# Patient Record
Sex: FEMALE | Race: WHITE | NOT HISPANIC OR LATINO | Employment: FULL TIME | URBAN - NONMETROPOLITAN AREA
[De-identification: names, ages, dates, MRNs, and addresses within clinical notes are randomized per-mention and may not be internally consistent; named-entity substitution may affect disease eponyms.]

---

## 2018-10-24 ENCOUNTER — HOSPITAL ENCOUNTER (OUTPATIENT)
Dept: MAMMOGRAPHY | Facility: HOSPITAL | Age: 38
Discharge: HOME | End: 2018-10-24
Admitting: NURSE PRACTITIONER

## 2018-10-24 DIAGNOSIS — Z12.39 ENCOUNTER FOR BREAST CANCER SCREENING OTHER THAN MAMMOGRAM: ICD-10-CM

## 2018-10-24 PROCEDURE — 77063 BREAST TOMOSYNTHESIS BI: CPT | Performed by: RADIOLOGY

## 2018-10-24 PROCEDURE — 77067 SCR MAMMO BI INCL CAD: CPT

## 2018-10-24 PROCEDURE — 77067 SCR MAMMO BI INCL CAD: CPT | Performed by: RADIOLOGY

## 2018-10-24 PROCEDURE — 77063 BREAST TOMOSYNTHESIS BI: CPT

## 2018-11-14 ENCOUNTER — HOSPITAL ENCOUNTER (EMERGENCY)
Facility: HOSPITAL | Age: 38
Discharge: HOME | End: 2018-11-14
Attending: EMERGENCY MEDICINE | Admitting: EMERGENCY MEDICINE

## 2018-11-14 VITALS
WEIGHT: 250 LBS | DIASTOLIC BLOOD PRESSURE: 58 MMHG | BODY MASS INDEX: 35 KG/M2 | TEMPERATURE: 99.3 F | RESPIRATION RATE: 16 BRPM | HEART RATE: 105 BPM | HEIGHT: 71 IN | OXYGEN SATURATION: 98 % | SYSTOLIC BLOOD PRESSURE: 115 MMHG

## 2018-11-14 DIAGNOSIS — J06.9 UPPER RESPIRATORY TRACT INFECTION, UNSPECIFIED TYPE: Primary | ICD-10-CM

## 2018-11-14 LAB
FLUAV AG NPH QL: NEGATIVE
FLUBV AG NPH QL IA: NEGATIVE

## 2018-11-14 PROCEDURE — 94640 AIRWAY INHALATION TREATMENT: CPT

## 2018-11-14 PROCEDURE — 94799 UNLISTED PULMONARY SVC/PX: CPT

## 2018-11-14 PROCEDURE — 87804 INFLUENZA ASSAY W/OPTIC: CPT | Performed by: PHYSICIAN ASSISTANT

## 2018-11-14 PROCEDURE — 99284 EMERGENCY DEPT VISIT MOD MDM: CPT

## 2018-11-14 RX ORDER — CETIRIZINE HYDROCHLORIDE 10 MG/1
10 TABLET ORAL DAILY
Qty: 30 TABLET | Refills: 0 | Status: SHIPPED | OUTPATIENT
Start: 2018-11-14

## 2018-11-14 RX ORDER — METHYLPREDNISOLONE 4 MG/1
TABLET ORAL
Qty: 21 TABLET | Refills: 0 | Status: SHIPPED | OUTPATIENT
Start: 2018-11-14

## 2018-11-14 RX ORDER — ALBUTEROL SULFATE 2.5 MG/3ML
2.5 SOLUTION RESPIRATORY (INHALATION) ONCE
Status: COMPLETED | OUTPATIENT
Start: 2018-11-14 | End: 2018-11-14

## 2018-11-14 RX ORDER — PROMETHAZINE HYDROCHLORIDE AND CODEINE PHOSPHATE 6.25; 1 MG/5ML; MG/5ML
5 SYRUP ORAL ONCE
Status: COMPLETED | OUTPATIENT
Start: 2018-11-14 | End: 2018-11-14

## 2018-11-14 RX ORDER — AZITHROMYCIN 250 MG/1
TABLET, FILM COATED ORAL
Qty: 6 TABLET | Refills: 0 | Status: SHIPPED | OUTPATIENT
Start: 2018-11-14

## 2018-11-14 RX ORDER — LEVOTHYROXINE SODIUM 0.1 MG/1
100 TABLET ORAL DAILY
COMMUNITY

## 2018-11-14 RX ADMIN — PROMETHAZINE HYDROCHLORIDE AND CODEINE PHOSPHATE 5 ML: 6.25; 1 SOLUTION ORAL at 23:13

## 2018-11-14 RX ADMIN — ALBUTEROL SULFATE 2.5 MG: 2.5 SOLUTION RESPIRATORY (INHALATION) at 22:51

## 2018-11-15 NOTE — ED PROVIDER NOTES
Subjective     History provided by:  Patient  URI   Presenting symptoms: congestion, cough, fever and rhinorrhea    Severity:  Moderate  Onset quality:  Sudden  Duration:  4 days  Timing:  Constant  Progression:  Worsening  Chronicity:  New  Relieved by:  Nothing  Ineffective treatments:  OTC medications      Review of Systems   Constitutional: Positive for fever.   HENT: Positive for congestion and rhinorrhea.    Respiratory: Positive for cough.    Cardiovascular: Negative.  Negative for chest pain.   Gastrointestinal: Negative.  Negative for abdominal pain.   Endocrine: Negative.    Genitourinary: Negative.  Negative for dysuria.   Skin: Negative.    Neurological: Negative.    Psychiatric/Behavioral: Negative.    All other systems reviewed and are negative.      Past Medical History:   Diagnosis Date   • Disease of thyroid gland    • Multiple sclerosis (CMS/HCC)        No Known Allergies    Past Surgical History:   Procedure Laterality Date   • CHOLECYSTECTOMY     • ENDOMETRIAL ABLATION         Family History   Problem Relation Age of Onset   • Breast cancer Maternal Aunt        Social History     Socioeconomic History   • Marital status:      Spouse name: Not on file   • Number of children: Not on file   • Years of education: Not on file   • Highest education level: Not on file   Tobacco Use   • Smoking status: Never Smoker   Substance and Sexual Activity   • Alcohol use: No     Frequency: Never   • Drug use: No   • Sexual activity: Yes     Partners: Male           Objective   Physical Exam   Constitutional: She is oriented to person, place, and time. She appears well-developed and well-nourished. No distress.   HENT:   Head: Normocephalic and atraumatic.   Right Ear: External ear normal.   Left Ear: External ear normal.   Nose: Nose normal.   Eyes: Conjunctivae are normal.   Neck: Normal range of motion. Neck supple. No JVD present. No tracheal deviation present.   Cardiovascular: Normal rate, regular  rhythm and normal heart sounds.   No murmur heard.  Pulmonary/Chest: Effort normal and breath sounds normal. No respiratory distress. She has no wheezes.   Abdominal: Soft. Bowel sounds are normal. There is no tenderness.   Musculoskeletal: Normal range of motion. She exhibits no edema or deformity.   Neurological: She is alert and oriented to person, place, and time. No cranial nerve deficit.   Skin: Skin is warm and dry. No rash noted. She is not diaphoretic. No erythema. No pallor.   Psychiatric: She has a normal mood and affect. Her behavior is normal. Thought content normal.   Nursing note and vitals reviewed.      Procedures           ED Course                  MDM  Number of Diagnoses or Management Options  Upper respiratory tract infection, unspecified type: new and requires workup     Amount and/or Complexity of Data Reviewed  Clinical lab tests: ordered and reviewed    Risk of Complications, Morbidity, and/or Mortality  Presenting problems: low  Diagnostic procedures: low  Management options: low    Patient Progress  Patient progress: stable        Final diagnoses:   Upper respiratory tract infection, unspecified type            Abad Alfredo PA  11/14/18 7774

## 2019-03-23 ENCOUNTER — HOSPITAL ENCOUNTER (EMERGENCY)
Facility: HOSPITAL | Age: 39
Discharge: HOME | End: 2019-03-23
Attending: EMERGENCY MEDICINE | Admitting: EMERGENCY MEDICINE

## 2019-03-23 ENCOUNTER — APPOINTMENT (OUTPATIENT)
Dept: GENERAL RADIOLOGY | Facility: HOSPITAL | Age: 39
End: 2019-03-23

## 2019-03-23 VITALS
HEIGHT: 71 IN | BODY MASS INDEX: 35.7 KG/M2 | HEART RATE: 100 BPM | TEMPERATURE: 99 F | OXYGEN SATURATION: 98 % | RESPIRATION RATE: 16 BRPM | WEIGHT: 255 LBS | SYSTOLIC BLOOD PRESSURE: 106 MMHG | DIASTOLIC BLOOD PRESSURE: 65 MMHG

## 2019-03-23 DIAGNOSIS — J10.1 INFLUENZA A: Primary | ICD-10-CM

## 2019-03-23 LAB
ALBUMIN SERPL-MCNC: 4.5 G/DL (ref 3.5–5.2)
ALBUMIN/GLOB SERPL: 1.3 G/DL
ALP SERPL-CCNC: 64 U/L (ref 39–117)
ALT SERPL W P-5'-P-CCNC: 37 U/L (ref 1–33)
ANION GAP SERPL CALCULATED.3IONS-SCNC: 13.4 MMOL/L
AST SERPL-CCNC: 50 U/L (ref 1–32)
BACTERIA UR QL AUTO: ABNORMAL /HPF
BASOPHILS # BLD AUTO: 0.03 10*3/MM3 (ref 0–0.2)
BASOPHILS NFR BLD AUTO: 0.4 % (ref 0–1.5)
BILIRUB SERPL-MCNC: 0.6 MG/DL (ref 0.2–1.2)
BILIRUB UR QL STRIP: NEGATIVE
BUN BLD-MCNC: 13 MG/DL (ref 6–20)
BUN/CREAT SERPL: 14 (ref 7–25)
CALCIUM SPEC-SCNC: 8.8 MG/DL (ref 8.6–10.5)
CHLORIDE SERPL-SCNC: 99 MMOL/L (ref 98–107)
CLARITY UR: ABNORMAL
CO2 SERPL-SCNC: 23.6 MMOL/L (ref 22–29)
COLOR UR: YELLOW
CREAT BLD-MCNC: 0.93 MG/DL (ref 0.57–1)
D-LACTATE SERPL-SCNC: 1.1 MMOL/L (ref 0.5–2)
DEPRECATED RDW RBC AUTO: 41.9 FL (ref 37–54)
EOSINOPHIL # BLD AUTO: 0.01 10*3/MM3 (ref 0–0.4)
EOSINOPHIL NFR BLD AUTO: 0.1 % (ref 0.3–6.2)
ERYTHROCYTE [DISTWIDTH] IN BLOOD BY AUTOMATED COUNT: 14 % (ref 12.3–15.4)
FLUAV AG NPH QL: POSITIVE
FLUBV AG NPH QL IA: NEGATIVE
GFR SERPL CREATININE-BSD FRML MDRD: 67 ML/MIN/1.73
GLOBULIN UR ELPH-MCNC: 3.5 GM/DL
GLUCOSE BLD-MCNC: 116 MG/DL (ref 65–99)
GLUCOSE UR STRIP-MCNC: NEGATIVE MG/DL
HCT VFR BLD AUTO: 38.7 % (ref 34–46.6)
HGB BLD-MCNC: 13.1 G/DL (ref 12–15.9)
HGB UR QL STRIP.AUTO: ABNORMAL
HYALINE CASTS UR QL AUTO: ABNORMAL /LPF
IMM GRANULOCYTES # BLD AUTO: 0.01 10*3/MM3 (ref 0–0.05)
IMM GRANULOCYTES NFR BLD AUTO: 0.1 % (ref 0–0.5)
KETONES UR QL STRIP: ABNORMAL
LEUKOCYTE ESTERASE UR QL STRIP.AUTO: NEGATIVE
LIPASE SERPL-CCNC: 33 U/L (ref 13–60)
LYMPHOCYTES # BLD AUTO: 1.9 10*3/MM3 (ref 0.7–3.1)
LYMPHOCYTES NFR BLD AUTO: 26.9 % (ref 19.6–45.3)
MCH RBC QN AUTO: 28.3 PG (ref 26.6–33)
MCHC RBC AUTO-ENTMCNC: 33.9 G/DL (ref 31.5–35.7)
MCV RBC AUTO: 83.6 FL (ref 79–97)
MONOCYTES # BLD AUTO: 1.38 10*3/MM3 (ref 0.1–0.9)
MONOCYTES NFR BLD AUTO: 19.5 % (ref 5–12)
NEUTROPHILS # BLD AUTO: 3.73 10*3/MM3 (ref 1.4–7)
NEUTROPHILS NFR BLD AUTO: 53 % (ref 42.7–76)
NITRITE UR QL STRIP: NEGATIVE
PH UR STRIP.AUTO: 5.5 [PH] (ref 5–8)
PLATELET # BLD AUTO: 261 10*3/MM3 (ref 140–450)
PMV BLD AUTO: 9.7 FL (ref 6–12)
POTASSIUM BLD-SCNC: 3.5 MMOL/L (ref 3.5–5.2)
PROT SERPL-MCNC: 8 G/DL (ref 6–8.5)
PROT UR QL STRIP: NEGATIVE
RBC # BLD AUTO: 4.63 10*6/MM3 (ref 3.77–5.28)
RBC # UR: ABNORMAL /HPF
REF LAB TEST METHOD: ABNORMAL
SODIUM BLD-SCNC: 136 MMOL/L (ref 136–145)
SP GR UR STRIP: 1.01 (ref 1–1.03)
SQUAMOUS #/AREA URNS HPF: ABNORMAL /HPF
UROBILINOGEN UR QL STRIP: ABNORMAL
WBC NRBC COR # BLD: 7.06 10*3/MM3 (ref 3.4–10.8)
WBC UR QL AUTO: ABNORMAL /HPF

## 2019-03-23 PROCEDURE — 81001 URINALYSIS AUTO W/SCOPE: CPT | Performed by: EMERGENCY MEDICINE

## 2019-03-23 PROCEDURE — 25010000002 ONDANSETRON PER 1 MG: Performed by: EMERGENCY MEDICINE

## 2019-03-23 PROCEDURE — 85025 COMPLETE CBC W/AUTO DIFF WBC: CPT | Performed by: EMERGENCY MEDICINE

## 2019-03-23 PROCEDURE — 83690 ASSAY OF LIPASE: CPT | Performed by: EMERGENCY MEDICINE

## 2019-03-23 PROCEDURE — 71046 X-RAY EXAM CHEST 2 VIEWS: CPT | Performed by: RADIOLOGY

## 2019-03-23 PROCEDURE — 87804 INFLUENZA ASSAY W/OPTIC: CPT | Performed by: EMERGENCY MEDICINE

## 2019-03-23 PROCEDURE — 83605 ASSAY OF LACTIC ACID: CPT | Performed by: EMERGENCY MEDICINE

## 2019-03-23 PROCEDURE — 80053 COMPREHEN METABOLIC PANEL: CPT | Performed by: EMERGENCY MEDICINE

## 2019-03-23 PROCEDURE — 87040 BLOOD CULTURE FOR BACTERIA: CPT | Performed by: EMERGENCY MEDICINE

## 2019-03-23 PROCEDURE — 99284 EMERGENCY DEPT VISIT MOD MDM: CPT

## 2019-03-23 PROCEDURE — 71046 X-RAY EXAM CHEST 2 VIEWS: CPT

## 2019-03-23 PROCEDURE — 96361 HYDRATE IV INFUSION ADD-ON: CPT

## 2019-03-23 PROCEDURE — 96374 THER/PROPH/DIAG INJ IV PUSH: CPT

## 2019-03-23 RX ORDER — SODIUM CHLORIDE 9 MG/ML
125 INJECTION, SOLUTION INTRAVENOUS CONTINUOUS
Status: DISCONTINUED | OUTPATIENT
Start: 2019-03-23 | End: 2019-03-23 | Stop reason: HOSPADM

## 2019-03-23 RX ORDER — PROMETHAZINE HYDROCHLORIDE 25 MG/1
25 SUPPOSITORY RECTAL EVERY 6 HOURS PRN
Qty: 12 SUPPOSITORY | Refills: 0 | Status: SHIPPED | OUTPATIENT
Start: 2019-03-23

## 2019-03-23 RX ORDER — ONDANSETRON 2 MG/ML
4 INJECTION INTRAMUSCULAR; INTRAVENOUS ONCE
Status: COMPLETED | OUTPATIENT
Start: 2019-03-23 | End: 2019-03-23

## 2019-03-23 RX ORDER — SODIUM CHLORIDE 0.9 % (FLUSH) 0.9 %
10 SYRINGE (ML) INJECTION AS NEEDED
Status: DISCONTINUED | OUTPATIENT
Start: 2019-03-23 | End: 2019-03-23 | Stop reason: HOSPADM

## 2019-03-23 RX ORDER — ONDANSETRON 4 MG/1
4 TABLET, ORALLY DISINTEGRATING ORAL 4 TIMES DAILY
Qty: 15 TABLET | Refills: 0 | Status: SHIPPED | OUTPATIENT
Start: 2019-03-23

## 2019-03-23 RX ADMIN — SODIUM CHLORIDE 125 ML/HR: 9 INJECTION, SOLUTION INTRAVENOUS at 01:40

## 2019-03-23 RX ADMIN — ONDANSETRON 4 MG: 2 INJECTION, SOLUTION INTRAMUSCULAR; INTRAVENOUS at 01:35

## 2019-03-23 RX ADMIN — SODIUM CHLORIDE 1000 ML: 9 INJECTION, SOLUTION INTRAVENOUS at 01:42

## 2019-03-28 LAB
BACTERIA SPEC AEROBE CULT: NORMAL
BACTERIA SPEC AEROBE CULT: NORMAL

## 2020-01-14 ENCOUNTER — HOSPITAL ENCOUNTER (OUTPATIENT)
Dept: MAMMOGRAPHY | Facility: HOSPITAL | Age: 40
Discharge: HOME | End: 2020-01-14
Admitting: PHYSICIAN ASSISTANT

## 2020-01-14 ENCOUNTER — LAB (OUTPATIENT)
Dept: LAB | Facility: HOSPITAL | Age: 40
End: 2020-01-14

## 2020-01-14 ENCOUNTER — TRANSCRIBE ORDERS (OUTPATIENT)
Dept: ADMINISTRATIVE | Facility: HOSPITAL | Age: 40
End: 2020-01-14

## 2020-01-14 DIAGNOSIS — E78.1 HYPERTRIGLYCERIDEMIA, FAMILIAL: ICD-10-CM

## 2020-01-14 DIAGNOSIS — Z12.31 VISIT FOR SCREENING MAMMOGRAM: ICD-10-CM

## 2020-01-14 DIAGNOSIS — E78.1 PURE HYPERGLYCERIDEMIA: Primary | ICD-10-CM

## 2020-01-14 DIAGNOSIS — E03.9 HYPOTHYROIDISM, ADULT: ICD-10-CM

## 2020-01-14 LAB
ALBUMIN SERPL-MCNC: 4.2 G/DL (ref 3.5–5.2)
ALBUMIN/GLOB SERPL: 1.2 G/DL
ALP SERPL-CCNC: 71 U/L (ref 39–117)
ALT SERPL W P-5'-P-CCNC: 35 U/L (ref 1–33)
ANION GAP SERPL CALCULATED.3IONS-SCNC: 12.2 MMOL/L (ref 5–15)
AST SERPL-CCNC: 31 U/L (ref 1–32)
BASOPHILS # BLD AUTO: 0.1 10*3/MM3 (ref 0–0.2)
BASOPHILS NFR BLD AUTO: 1 % (ref 0–1.5)
BILIRUB SERPL-MCNC: 0.6 MG/DL (ref 0.2–1.2)
BUN BLD-MCNC: 11 MG/DL (ref 6–20)
BUN/CREAT SERPL: 12.2 (ref 7–25)
CALCIUM SPEC-SCNC: 9 MG/DL (ref 8.6–10.5)
CHLORIDE SERPL-SCNC: 102 MMOL/L (ref 98–107)
CHOLEST SERPL-MCNC: 157 MG/DL (ref 0–200)
CO2 SERPL-SCNC: 24.8 MMOL/L (ref 22–29)
CREAT BLD-MCNC: 0.9 MG/DL (ref 0.57–1)
DEPRECATED RDW RBC AUTO: 40.6 FL (ref 37–54)
EOSINOPHIL # BLD AUTO: 0.23 10*3/MM3 (ref 0–0.4)
EOSINOPHIL NFR BLD AUTO: 2.4 % (ref 0.3–6.2)
ERYTHROCYTE [DISTWIDTH] IN BLOOD BY AUTOMATED COUNT: 13 % (ref 12.3–15.4)
GFR SERPL CREATININE-BSD FRML MDRD: 69 ML/MIN/1.73
GLOBULIN UR ELPH-MCNC: 3.4 GM/DL
GLUCOSE BLD-MCNC: 103 MG/DL (ref 65–99)
HCT VFR BLD AUTO: 37.7 % (ref 34–46.6)
HDLC SERPL-MCNC: 38 MG/DL (ref 40–60)
HGB BLD-MCNC: 12.9 G/DL (ref 12–15.9)
IMM GRANULOCYTES # BLD AUTO: 0.04 10*3/MM3 (ref 0–0.05)
IMM GRANULOCYTES NFR BLD AUTO: 0.4 % (ref 0–0.5)
LDLC SERPL CALC-MCNC: 95 MG/DL (ref 0–100)
LDLC/HDLC SERPL: 2.49 {RATIO}
LYMPHOCYTES # BLD AUTO: 2.9 10*3/MM3 (ref 0.7–3.1)
LYMPHOCYTES NFR BLD AUTO: 30.1 % (ref 19.6–45.3)
MCH RBC QN AUTO: 29.3 PG (ref 26.6–33)
MCHC RBC AUTO-ENTMCNC: 34.2 G/DL (ref 31.5–35.7)
MCV RBC AUTO: 85.7 FL (ref 79–97)
MONOCYTES # BLD AUTO: 0.74 10*3/MM3 (ref 0.1–0.9)
MONOCYTES NFR BLD AUTO: 7.7 % (ref 5–12)
NEUTROPHILS # BLD AUTO: 5.62 10*3/MM3 (ref 1.7–7)
NEUTROPHILS NFR BLD AUTO: 58.4 % (ref 42.7–76)
NRBC BLD AUTO-RTO: 0 /100 WBC (ref 0–0.2)
PLATELET # BLD AUTO: 310 10*3/MM3 (ref 140–450)
PMV BLD AUTO: 9.8 FL (ref 6–12)
POTASSIUM BLD-SCNC: 4.2 MMOL/L (ref 3.5–5.2)
PROT SERPL-MCNC: 7.6 G/DL (ref 6–8.5)
RBC # BLD AUTO: 4.4 10*6/MM3 (ref 3.77–5.28)
SODIUM BLD-SCNC: 139 MMOL/L (ref 136–145)
TRIGL SERPL-MCNC: 121 MG/DL (ref 0–150)
TSH SERPL DL<=0.05 MIU/L-ACNC: 5.53 UIU/ML (ref 0.27–4.2)
VLDLC SERPL-MCNC: 24.2 MG/DL (ref 5–40)
WBC NRBC COR # BLD: 9.63 10*3/MM3 (ref 3.4–10.8)

## 2020-01-14 PROCEDURE — 77063 BREAST TOMOSYNTHESIS BI: CPT

## 2020-01-14 PROCEDURE — 80053 COMPREHEN METABOLIC PANEL: CPT

## 2020-01-14 PROCEDURE — 84443 ASSAY THYROID STIM HORMONE: CPT

## 2020-01-14 PROCEDURE — 80061 LIPID PANEL: CPT

## 2020-01-14 PROCEDURE — 77063 BREAST TOMOSYNTHESIS BI: CPT | Performed by: RADIOLOGY

## 2020-01-14 PROCEDURE — 85025 COMPLETE CBC W/AUTO DIFF WBC: CPT

## 2020-01-14 PROCEDURE — 36415 COLL VENOUS BLD VENIPUNCTURE: CPT

## 2020-01-14 PROCEDURE — 77067 SCR MAMMO BI INCL CAD: CPT | Performed by: RADIOLOGY

## 2020-01-14 PROCEDURE — 77067 SCR MAMMO BI INCL CAD: CPT

## 2020-01-27 ENCOUNTER — APPOINTMENT (OUTPATIENT)
Dept: CT IMAGING | Facility: HOSPITAL | Age: 40
End: 2020-01-27

## 2020-01-27 ENCOUNTER — HOSPITAL ENCOUNTER (EMERGENCY)
Facility: HOSPITAL | Age: 40
Discharge: HOME | End: 2020-01-27
Attending: FAMILY MEDICINE | Admitting: FAMILY MEDICINE

## 2020-01-27 VITALS
DIASTOLIC BLOOD PRESSURE: 66 MMHG | BODY MASS INDEX: 37.94 KG/M2 | RESPIRATION RATE: 20 BRPM | SYSTOLIC BLOOD PRESSURE: 131 MMHG | TEMPERATURE: 97.9 F | WEIGHT: 265 LBS | OXYGEN SATURATION: 99 % | HEIGHT: 70 IN | HEART RATE: 69 BPM

## 2020-01-27 DIAGNOSIS — N20.1 URETEROLITHIASIS: Primary | ICD-10-CM

## 2020-01-27 LAB
ALBUMIN SERPL-MCNC: 4.32 G/DL (ref 3.5–5.2)
ALBUMIN/GLOB SERPL: 1.2 G/DL
ALP SERPL-CCNC: 74 U/L (ref 39–117)
ALT SERPL W P-5'-P-CCNC: 31 U/L (ref 1–33)
ANION GAP SERPL CALCULATED.3IONS-SCNC: 15.1 MMOL/L (ref 5–15)
AST SERPL-CCNC: 50 U/L (ref 1–32)
BACTERIA UR QL AUTO: ABNORMAL /HPF
BASOPHILS # BLD AUTO: 0.11 10*3/MM3 (ref 0–0.2)
BASOPHILS NFR BLD AUTO: 1 % (ref 0–1.5)
BILIRUB SERPL-MCNC: 0.7 MG/DL (ref 0.2–1.2)
BILIRUB UR QL STRIP: NEGATIVE
BUN BLD-MCNC: 10 MG/DL (ref 6–20)
BUN/CREAT SERPL: 10.2 (ref 7–25)
CALCIUM SPEC-SCNC: 9 MG/DL (ref 8.6–10.5)
CHLORIDE SERPL-SCNC: 103 MMOL/L (ref 98–107)
CLARITY UR: ABNORMAL
CO2 SERPL-SCNC: 19.9 MMOL/L (ref 22–29)
COLOR UR: ABNORMAL
CREAT BLD-MCNC: 0.98 MG/DL (ref 0.57–1)
DEPRECATED RDW RBC AUTO: 40.9 FL (ref 37–54)
EOSINOPHIL # BLD AUTO: 0.19 10*3/MM3 (ref 0–0.4)
EOSINOPHIL NFR BLD AUTO: 1.8 % (ref 0.3–6.2)
ERYTHROCYTE [DISTWIDTH] IN BLOOD BY AUTOMATED COUNT: 13.1 % (ref 12.3–15.4)
GFR SERPL CREATININE-BSD FRML MDRD: 63 ML/MIN/1.73
GLOBULIN UR ELPH-MCNC: 3.5 GM/DL
GLUCOSE BLD-MCNC: 116 MG/DL (ref 65–99)
GLUCOSE UR STRIP-MCNC: NEGATIVE MG/DL
HCT VFR BLD AUTO: 38 % (ref 34–46.6)
HGB BLD-MCNC: 12.8 G/DL (ref 12–15.9)
HGB UR QL STRIP.AUTO: ABNORMAL
HYALINE CASTS UR QL AUTO: ABNORMAL /LPF
IMM GRANULOCYTES # BLD AUTO: 0.04 10*3/MM3 (ref 0–0.05)
IMM GRANULOCYTES NFR BLD AUTO: 0.4 % (ref 0–0.5)
KETONES UR QL STRIP: ABNORMAL
LEUKOCYTE ESTERASE UR QL STRIP.AUTO: ABNORMAL
LIPASE SERPL-CCNC: 23 U/L (ref 13–60)
LYMPHOCYTES # BLD AUTO: 2.98 10*3/MM3 (ref 0.7–3.1)
LYMPHOCYTES NFR BLD AUTO: 27.6 % (ref 19.6–45.3)
MCH RBC QN AUTO: 29 PG (ref 26.6–33)
MCHC RBC AUTO-ENTMCNC: 33.7 G/DL (ref 31.5–35.7)
MCV RBC AUTO: 86.2 FL (ref 79–97)
MONOCYTES # BLD AUTO: 1.09 10*3/MM3 (ref 0.1–0.9)
MONOCYTES NFR BLD AUTO: 10.1 % (ref 5–12)
NEUTROPHILS # BLD AUTO: 6.38 10*3/MM3 (ref 1.7–7)
NEUTROPHILS NFR BLD AUTO: 59.1 % (ref 42.7–76)
NITRITE UR QL STRIP: NEGATIVE
NRBC BLD AUTO-RTO: 0 /100 WBC (ref 0–0.2)
PH UR STRIP.AUTO: 6.5 [PH] (ref 5–8)
PLATELET # BLD AUTO: 363 10*3/MM3 (ref 140–450)
PMV BLD AUTO: 9.5 FL (ref 6–12)
POTASSIUM BLD-SCNC: 4.1 MMOL/L (ref 3.5–5.2)
PROT SERPL-MCNC: 7.8 G/DL (ref 6–8.5)
PROT UR QL STRIP: ABNORMAL
RBC # BLD AUTO: 4.41 10*6/MM3 (ref 3.77–5.28)
RBC # UR: ABNORMAL /HPF
REF LAB TEST METHOD: ABNORMAL
SODIUM BLD-SCNC: 138 MMOL/L (ref 136–145)
SP GR UR STRIP: >1.03 (ref 1–1.03)
SQUAMOUS #/AREA URNS HPF: ABNORMAL /HPF
UROBILINOGEN UR QL STRIP: ABNORMAL
WBC NRBC COR # BLD: 10.79 10*3/MM3 (ref 3.4–10.8)
WBC UR QL AUTO: ABNORMAL /HPF

## 2020-01-27 PROCEDURE — 96374 THER/PROPH/DIAG INJ IV PUSH: CPT

## 2020-01-27 PROCEDURE — 83690 ASSAY OF LIPASE: CPT | Performed by: NURSE PRACTITIONER

## 2020-01-27 PROCEDURE — 74176 CT ABD & PELVIS W/O CONTRAST: CPT | Performed by: RADIOLOGY

## 2020-01-27 PROCEDURE — 81001 URINALYSIS AUTO W/SCOPE: CPT | Performed by: NURSE PRACTITIONER

## 2020-01-27 PROCEDURE — 99283 EMERGENCY DEPT VISIT LOW MDM: CPT

## 2020-01-27 PROCEDURE — 96376 TX/PRO/DX INJ SAME DRUG ADON: CPT

## 2020-01-27 PROCEDURE — 25010000002 PROMETHAZINE PER 50 MG: Performed by: NURSE PRACTITIONER

## 2020-01-27 PROCEDURE — 25010000002 HYDROMORPHONE PER 4 MG: Performed by: NURSE PRACTITIONER

## 2020-01-27 PROCEDURE — 74176 CT ABD & PELVIS W/O CONTRAST: CPT

## 2020-01-27 PROCEDURE — 25010000002 ONDANSETRON PER 1 MG: Performed by: NURSE PRACTITIONER

## 2020-01-27 PROCEDURE — 85025 COMPLETE CBC W/AUTO DIFF WBC: CPT | Performed by: NURSE PRACTITIONER

## 2020-01-27 PROCEDURE — 96375 TX/PRO/DX INJ NEW DRUG ADDON: CPT

## 2020-01-27 PROCEDURE — 80053 COMPREHEN METABOLIC PANEL: CPT | Performed by: NURSE PRACTITIONER

## 2020-01-27 PROCEDURE — 25010000002 HYDROMORPHONE 1 MG/ML SOLUTION: Performed by: NURSE PRACTITIONER

## 2020-01-27 PROCEDURE — 96361 HYDRATE IV INFUSION ADD-ON: CPT

## 2020-01-27 PROCEDURE — 87086 URINE CULTURE/COLONY COUNT: CPT | Performed by: NURSE PRACTITIONER

## 2020-01-27 PROCEDURE — 25010000002 KETOROLAC TROMETHAMINE PER 15 MG: Performed by: NURSE PRACTITIONER

## 2020-01-27 RX ORDER — CEFTRIAXONE 1 G/1
1000 INJECTION, POWDER, FOR SOLUTION INTRAMUSCULAR; INTRAVENOUS ONCE
Status: DISCONTINUED | OUTPATIENT
Start: 2020-01-27 | End: 2020-01-27

## 2020-01-27 RX ORDER — KETOROLAC TROMETHAMINE 30 MG/ML
30 INJECTION, SOLUTION INTRAMUSCULAR; INTRAVENOUS ONCE
Status: COMPLETED | OUTPATIENT
Start: 2020-01-27 | End: 2020-01-27

## 2020-01-27 RX ORDER — HYDROMORPHONE HYDROCHLORIDE 1 MG/ML
0.5 INJECTION, SOLUTION INTRAMUSCULAR; INTRAVENOUS; SUBCUTANEOUS ONCE
Status: COMPLETED | OUTPATIENT
Start: 2020-01-27 | End: 2020-01-27

## 2020-01-27 RX ORDER — PROMETHAZINE HYDROCHLORIDE 25 MG/ML
12.5 INJECTION, SOLUTION INTRAMUSCULAR; INTRAVENOUS ONCE
Status: COMPLETED | OUTPATIENT
Start: 2020-01-27 | End: 2020-01-27

## 2020-01-27 RX ORDER — ONDANSETRON 4 MG/1
4 TABLET, ORALLY DISINTEGRATING ORAL EVERY 6 HOURS PRN
Qty: 12 TABLET | Refills: 0 | Status: SHIPPED | OUTPATIENT
Start: 2020-01-27

## 2020-01-27 RX ORDER — CEPHALEXIN 500 MG/1
500 CAPSULE ORAL 3 TIMES DAILY
Qty: 21 CAPSULE | Refills: 0 | Status: SHIPPED | OUTPATIENT
Start: 2020-01-27 | End: 2020-02-03

## 2020-01-27 RX ORDER — TAMSULOSIN HYDROCHLORIDE 0.4 MG/1
1 CAPSULE ORAL DAILY
Qty: 15 CAPSULE | Refills: 0 | Status: SHIPPED | OUTPATIENT
Start: 2020-01-27

## 2020-01-27 RX ORDER — SODIUM CHLORIDE 0.9 % (FLUSH) 0.9 %
10 SYRINGE (ML) INJECTION AS NEEDED
Status: DISCONTINUED | OUTPATIENT
Start: 2020-01-27 | End: 2020-01-27 | Stop reason: HOSPADM

## 2020-01-27 RX ORDER — ONDANSETRON 2 MG/ML
4 INJECTION INTRAMUSCULAR; INTRAVENOUS ONCE
Status: COMPLETED | OUTPATIENT
Start: 2020-01-27 | End: 2020-01-27

## 2020-01-27 RX ORDER — OXYCODONE HYDROCHLORIDE AND ACETAMINOPHEN 5; 325 MG/1; MG/1
1 TABLET ORAL EVERY 6 HOURS PRN
Qty: 12 TABLET | Refills: 0 | Status: SHIPPED | OUTPATIENT
Start: 2020-01-27

## 2020-01-27 RX ORDER — LIDOCAINE HYDROCHLORIDE 10 MG/ML
2.1 INJECTION, SOLUTION EPIDURAL; INFILTRATION; INTRACAUDAL; PERINEURAL ONCE
Status: DISCONTINUED | OUTPATIENT
Start: 2020-01-27 | End: 2020-01-27

## 2020-01-27 RX ORDER — KETOROLAC TROMETHAMINE 10 MG/1
10 TABLET, FILM COATED ORAL EVERY 6 HOURS PRN
Qty: 10 TABLET | Refills: 0 | Status: SHIPPED | OUTPATIENT
Start: 2020-01-27

## 2020-01-27 RX ADMIN — HYDROMORPHONE HYDROCHLORIDE 0.5 MG: 1 INJECTION, SOLUTION INTRAMUSCULAR; INTRAVENOUS; SUBCUTANEOUS at 10:14

## 2020-01-27 RX ADMIN — ONDANSETRON 4 MG: 2 INJECTION INTRAMUSCULAR; INTRAVENOUS at 10:18

## 2020-01-27 RX ADMIN — PROMETHAZINE HYDROCHLORIDE 12.5 MG: 25 INJECTION INTRAMUSCULAR; INTRAVENOUS at 10:52

## 2020-01-27 RX ADMIN — HYDROMORPHONE HYDROCHLORIDE 1 MG: 1 INJECTION, SOLUTION INTRAMUSCULAR; INTRAVENOUS; SUBCUTANEOUS at 10:51

## 2020-01-27 RX ADMIN — SODIUM CHLORIDE 1000 ML: 9 INJECTION, SOLUTION INTRAVENOUS at 10:08

## 2020-01-27 RX ADMIN — KETOROLAC TROMETHAMINE 30 MG: 30 INJECTION, SOLUTION INTRAMUSCULAR at 11:43

## 2020-01-27 NOTE — DISCHARGE INSTRUCTIONS
Follow up with Dr. Joe in 2-3 days.     Increase fluids.    Return to the emergency room for worsening symptoms.

## 2020-01-27 NOTE — ED PROVIDER NOTES
Subjective     History provided by:  Patient   used: No    Flank Pain   Pain location:  R flank  Pain quality: sharp    Pain severity:  Moderate  Onset quality:  Sudden  Timing:  Constant  Progression:  Worsening  Chronicity:  New  Context: not alcohol use, not diet changes, not eating, not medication withdrawal, not recent illness, not recent sexual activity, not sick contacts and not trauma    Relieved by:  Nothing  Worsened by:  Nothing  Ineffective treatments:  None tried  Associated symptoms: nausea and vomiting    Associated symptoms: no chest pain and no cough    Risk factors: has not had multiple surgeries        Review of Systems   Constitutional: Negative.    HENT: Negative.    Eyes: Negative.    Respiratory: Negative.  Negative for cough.    Cardiovascular: Negative.  Negative for chest pain.   Gastrointestinal: Positive for nausea and vomiting.   Endocrine: Negative.    Genitourinary: Positive for flank pain.   Skin: Negative.    Allergic/Immunologic: Negative.    Neurological: Negative.    Hematological: Negative.    Psychiatric/Behavioral: Negative.        Past Medical History:   Diagnosis Date   • Disease of thyroid gland    • Multiple sclerosis (CMS/HCC)        No Known Allergies    Past Surgical History:   Procedure Laterality Date   • CHOLECYSTECTOMY     • ENDOMETRIAL ABLATION         Family History   Problem Relation Age of Onset   • Breast cancer Maternal Aunt        Social History     Socioeconomic History   • Marital status:      Spouse name: Not on file   • Number of children: Not on file   • Years of education: Not on file   • Highest education level: Not on file   Tobacco Use   • Smoking status: Never Smoker   Substance and Sexual Activity   • Alcohol use: No     Frequency: Never   • Drug use: No   • Sexual activity: Yes     Partners: Male           Objective   Physical Exam   Constitutional: She is oriented to person, place, and time. She appears well-developed  and well-nourished.   HENT:   Head: Normocephalic.   Right Ear: External ear normal.   Left Ear: External ear normal.   Mouth/Throat: Oropharynx is clear and moist.   Eyes: Pupils are equal, round, and reactive to light. Conjunctivae and EOM are normal.   Neck: Normal range of motion. Neck supple.   Cardiovascular: Normal rate, regular rhythm, normal heart sounds and intact distal pulses.   Pulmonary/Chest: Effort normal and breath sounds normal.   Abdominal: Soft. Bowel sounds are normal. There is tenderness.   Rigth CVA tenderness   Musculoskeletal: Normal range of motion.   Neurological: She is alert and oriented to person, place, and time.   Skin: Skin is warm and dry. Capillary refill takes less than 2 seconds.   Psychiatric: She has a normal mood and affect. Her behavior is normal. Thought content normal.   Nursing note and vitals reviewed.      Procedures           ED Course                                               MDM    Final diagnoses:   Ureterolithiasis            Robert Alfredo, APRN  01/27/20 1142

## 2020-01-28 LAB — BACTERIA SPEC AEROBE CULT: ABNORMAL

## 2021-01-06 ENCOUNTER — HOSPITAL ENCOUNTER (OUTPATIENT)
Dept: HOSPITAL 79 - KOH-I | Age: 41
End: 2021-01-06
Attending: PHYSICIAN ASSISTANT
Payer: COMMERCIAL

## 2021-01-06 DIAGNOSIS — M79.672: Primary | ICD-10-CM

## 2021-02-04 ENCOUNTER — HOSPITAL ENCOUNTER (OUTPATIENT)
Dept: HOSPITAL 79 - EXRD | Age: 41
End: 2021-02-04
Attending: PODIATRIST
Payer: COMMERCIAL

## 2021-02-04 DIAGNOSIS — I73.89: Primary | ICD-10-CM

## 2021-02-04 DIAGNOSIS — I70.203: ICD-10-CM

## 2022-05-05 ENCOUNTER — HOSPITAL ENCOUNTER (OUTPATIENT)
Dept: HOSPITAL 79 - KOH-I | Age: 42
End: 2022-05-05
Attending: STUDENT IN AN ORGANIZED HEALTH CARE EDUCATION/TRAINING PROGRAM
Payer: COMMERCIAL

## 2022-05-05 DIAGNOSIS — R06.02: Primary | ICD-10-CM

## 2022-08-29 ENCOUNTER — TRANSCRIBE ORDERS (OUTPATIENT)
Dept: ADMINISTRATIVE | Facility: HOSPITAL | Age: 42
End: 2022-08-29

## 2022-08-29 DIAGNOSIS — K76.0 FATTY METAMORPHOSIS OF LIVER: Primary | ICD-10-CM

## 2022-09-14 ENCOUNTER — HOSPITAL ENCOUNTER (OUTPATIENT)
Dept: ULTRASOUND IMAGING | Facility: HOSPITAL | Age: 42
Discharge: HOME | End: 2022-09-14
Admitting: STUDENT IN AN ORGANIZED HEALTH CARE EDUCATION/TRAINING PROGRAM

## 2022-09-14 DIAGNOSIS — K76.0 FATTY METAMORPHOSIS OF LIVER: ICD-10-CM

## 2022-09-14 PROCEDURE — 76700 US EXAM ABDOM COMPLETE: CPT

## 2022-09-14 PROCEDURE — 76700 US EXAM ABDOM COMPLETE: CPT | Performed by: RADIOLOGY

## 2023-01-20 ENCOUNTER — APPOINTMENT (OUTPATIENT)
Dept: GENERAL RADIOLOGY | Facility: HOSPITAL | Age: 43
End: 2023-01-20
Payer: COMMERCIAL

## 2023-01-20 ENCOUNTER — HOSPITAL ENCOUNTER (EMERGENCY)
Facility: HOSPITAL | Age: 43
Discharge: HOME | End: 2023-01-21
Attending: EMERGENCY MEDICINE | Admitting: EMERGENCY MEDICINE
Payer: COMMERCIAL

## 2023-01-20 DIAGNOSIS — R07.9 CHEST PAIN, UNSPECIFIED TYPE: Primary | ICD-10-CM

## 2023-01-20 LAB
ALBUMIN SERPL-MCNC: 4 G/DL (ref 3.5–5.2)
ALBUMIN/GLOB SERPL: 1.3 G/DL
ALP SERPL-CCNC: 94 U/L (ref 39–117)
ALT SERPL W P-5'-P-CCNC: 25 U/L (ref 1–33)
ANION GAP SERPL CALCULATED.3IONS-SCNC: 6.8 MMOL/L (ref 5–15)
AST SERPL-CCNC: 34 U/L (ref 1–32)
BASOPHILS # BLD AUTO: 0.09 10*3/MM3 (ref 0–0.2)
BASOPHILS NFR BLD AUTO: 1 % (ref 0–1.5)
BILIRUB SERPL-MCNC: 0.4 MG/DL (ref 0–1.2)
BUN SERPL-MCNC: 10 MG/DL (ref 6–20)
BUN/CREAT SERPL: 11.5 (ref 7–25)
CALCIUM SPEC-SCNC: 9.2 MG/DL (ref 8.6–10.5)
CHLORIDE SERPL-SCNC: 103 MMOL/L (ref 98–107)
CO2 SERPL-SCNC: 27.2 MMOL/L (ref 22–29)
CREAT SERPL-MCNC: 0.87 MG/DL (ref 0.57–1)
DEPRECATED RDW RBC AUTO: 40.9 FL (ref 37–54)
EGFRCR SERPLBLD CKD-EPI 2021: 84.4 ML/MIN/1.73
EOSINOPHIL # BLD AUTO: 0.25 10*3/MM3 (ref 0–0.4)
EOSINOPHIL NFR BLD AUTO: 2.8 % (ref 0.3–6.2)
ERYTHROCYTE [DISTWIDTH] IN BLOOD BY AUTOMATED COUNT: 12.9 % (ref 12.3–15.4)
GLOBULIN UR ELPH-MCNC: 3.2 GM/DL
GLUCOSE SERPL-MCNC: 146 MG/DL (ref 65–99)
HCT VFR BLD AUTO: 37.6 % (ref 34–46.6)
HGB BLD-MCNC: 12.8 G/DL (ref 12–15.9)
HOLD SPECIMEN: NORMAL
HOLD SPECIMEN: NORMAL
IMM GRANULOCYTES # BLD AUTO: 0.04 10*3/MM3 (ref 0–0.05)
IMM GRANULOCYTES NFR BLD AUTO: 0.4 % (ref 0–0.5)
LYMPHOCYTES # BLD AUTO: 3.35 10*3/MM3 (ref 0.7–3.1)
LYMPHOCYTES NFR BLD AUTO: 37.1 % (ref 19.6–45.3)
MCH RBC QN AUTO: 29.8 PG (ref 26.6–33)
MCHC RBC AUTO-ENTMCNC: 34 G/DL (ref 31.5–35.7)
MCV RBC AUTO: 87.4 FL (ref 79–97)
MONOCYTES # BLD AUTO: 0.76 10*3/MM3 (ref 0.1–0.9)
MONOCYTES NFR BLD AUTO: 8.4 % (ref 5–12)
NEUTROPHILS NFR BLD AUTO: 4.55 10*3/MM3 (ref 1.7–7)
NEUTROPHILS NFR BLD AUTO: 50.3 % (ref 42.7–76)
NRBC BLD AUTO-RTO: 0 /100 WBC (ref 0–0.2)
PLATELET # BLD AUTO: 261 10*3/MM3 (ref 140–450)
PMV BLD AUTO: 9.2 FL (ref 6–12)
POTASSIUM SERPL-SCNC: 4.5 MMOL/L (ref 3.5–5.2)
PROT SERPL-MCNC: 7.2 G/DL (ref 6–8.5)
RBC # BLD AUTO: 4.3 10*6/MM3 (ref 3.77–5.28)
SODIUM SERPL-SCNC: 137 MMOL/L (ref 136–145)
TROPONIN T SERPL-MCNC: <0.01 NG/ML (ref 0–0.03)
WBC NRBC COR # BLD: 9.04 10*3/MM3 (ref 3.4–10.8)
WHOLE BLOOD HOLD COAG: NORMAL
WHOLE BLOOD HOLD SPECIMEN: NORMAL

## 2023-01-20 PROCEDURE — 71045 X-RAY EXAM CHEST 1 VIEW: CPT

## 2023-01-20 PROCEDURE — 93005 ELECTROCARDIOGRAM TRACING: CPT | Performed by: EMERGENCY MEDICINE

## 2023-01-20 PROCEDURE — 99283 EMERGENCY DEPT VISIT LOW MDM: CPT

## 2023-01-20 PROCEDURE — 80053 COMPREHEN METABOLIC PANEL: CPT | Performed by: EMERGENCY MEDICINE

## 2023-01-20 PROCEDURE — 84484 ASSAY OF TROPONIN QUANT: CPT | Performed by: EMERGENCY MEDICINE

## 2023-01-20 PROCEDURE — 36415 COLL VENOUS BLD VENIPUNCTURE: CPT

## 2023-01-20 PROCEDURE — 99284 EMERGENCY DEPT VISIT MOD MDM: CPT

## 2023-01-20 PROCEDURE — 85025 COMPLETE CBC W/AUTO DIFF WBC: CPT | Performed by: EMERGENCY MEDICINE

## 2023-01-20 RX ORDER — NITROGLYCERIN 0.4 MG/1
0.4 TABLET SUBLINGUAL
Status: DISCONTINUED | OUTPATIENT
Start: 2023-01-20 | End: 2023-01-21 | Stop reason: HOSPADM

## 2023-01-20 RX ORDER — SODIUM CHLORIDE 0.9 % (FLUSH) 0.9 %
10 SYRINGE (ML) INJECTION AS NEEDED
Status: DISCONTINUED | OUTPATIENT
Start: 2023-01-20 | End: 2023-01-21 | Stop reason: HOSPADM

## 2023-01-20 RX ORDER — ASPIRIN 81 MG/1
324 TABLET, CHEWABLE ORAL ONCE
Status: COMPLETED | OUTPATIENT
Start: 2023-01-20 | End: 2023-01-20

## 2023-01-20 RX ADMIN — ASPIRIN 324 MG: 81 TABLET, CHEWABLE ORAL at 20:48

## 2023-01-20 RX ADMIN — NITROGLYCERIN 0.4 MG: 0.4 TABLET, ORALLY DISINTEGRATING SUBLINGUAL at 23:25

## 2023-01-21 VITALS
BODY MASS INDEX: 35.7 KG/M2 | WEIGHT: 255 LBS | HEIGHT: 71 IN | SYSTOLIC BLOOD PRESSURE: 145 MMHG | OXYGEN SATURATION: 100 % | TEMPERATURE: 98.5 F | DIASTOLIC BLOOD PRESSURE: 88 MMHG | RESPIRATION RATE: 18 BRPM | HEART RATE: 94 BPM

## 2023-01-21 LAB
QT INTERVAL: 364 MS
QTC INTERVAL: 452 MS
TROPONIN T SERPL-MCNC: <0.01 NG/ML (ref 0–0.03)

## 2023-01-21 PROCEDURE — 84484 ASSAY OF TROPONIN QUANT: CPT | Performed by: EMERGENCY MEDICINE

## 2023-01-21 NOTE — ED NOTES
MEDICAL SCREENING:    Reason for Visit: Chest Pain    Patient initially seen in triage.  The patient was advised further evaluation and diagnostic testing will be needed, some of the treatment and testing will be initiated in the lobby in order to begin the process.  The patient will be returned to the waiting area for the time being and possibly be re-assessed by a subsequent ED provider.  The patient will be brought back to the treatment area in as timely manner as possible.       Bentley Henriquez MD  01/20/23 2038

## 2023-01-21 NOTE — ED PROVIDER NOTES
Subjective   History of Present Illness  Patient is a 42-year-old female with past medical history significant for thyroid disease, hyperlipidemia and multiple sclerosis.  She presents to the ED today with complaints of midsternal chest pain that she describes as achiness.  She reports that this pain has been ongoing for the last 4 to 5 days.  She states that she has never had chest pain before in the past so this concerned her.  She reports some mild shortness of breath.  Denied any radiation of pain.  She denies any fever, cough, nausea, vomiting, abdominal pain, diaphoresis, dizziness, headache, syncope, focal weakness, numbness or tingling.        Review of Systems   Constitutional: Negative.  Negative for fever.   HENT: Negative.    Eyes: Negative.    Respiratory: Positive for shortness of breath.    Cardiovascular: Positive for chest pain.   Gastrointestinal: Negative.  Negative for abdominal pain.   Endocrine: Negative.    Genitourinary: Negative.  Negative for dysuria.   Musculoskeletal: Negative.    Skin: Negative.    Allergic/Immunologic: Negative.    Neurological: Negative.    Hematological: Negative.    Psychiatric/Behavioral: Negative.    All other systems reviewed and are negative.      Past Medical History:   Diagnosis Date   • Disease of thyroid gland    • Multiple sclerosis (CMS/HCC)        No Known Allergies    Past Surgical History:   Procedure Laterality Date   • CHOLECYSTECTOMY     • ENDOMETRIAL ABLATION         Family History   Problem Relation Age of Onset   • Breast cancer Maternal Aunt        Social History     Socioeconomic History   • Marital status:    Tobacco Use   • Smoking status: Never   Substance and Sexual Activity   • Alcohol use: No   • Drug use: No   • Sexual activity: Yes     Partners: Male           Objective   Physical Exam  Vitals and nursing note reviewed.   Constitutional:       General: She is not in acute distress.     Appearance: She is well-developed. She is not  diaphoretic.   HENT:      Head: Normocephalic and atraumatic.      Right Ear: External ear normal.      Left Ear: External ear normal.      Nose: Nose normal.   Eyes:      Conjunctiva/sclera: Conjunctivae normal.      Pupils: Pupils are equal, round, and reactive to light.   Neck:      Vascular: No JVD.      Trachea: No tracheal deviation.   Cardiovascular:      Rate and Rhythm: Normal rate and regular rhythm.      Heart sounds: Normal heart sounds. No murmur heard.  Pulmonary:      Effort: Pulmonary effort is normal. No respiratory distress.      Breath sounds: Normal breath sounds. No wheezing.   Abdominal:      General: Bowel sounds are normal.      Palpations: Abdomen is soft.      Tenderness: There is no abdominal tenderness.   Musculoskeletal:         General: No deformity. Normal range of motion.      Cervical back: Normal range of motion and neck supple.   Skin:     General: Skin is warm and dry.      Capillary Refill: Capillary refill takes less than 2 seconds.      Coloration: Skin is not pale.      Findings: No erythema or rash.   Neurological:      General: No focal deficit present.      Mental Status: She is alert.      Cranial Nerves: No cranial nerve deficit.   Psychiatric:         Mood and Affect: Mood normal.         Behavior: Behavior normal.         Thought Content: Thought content normal.         Procedures       Results for orders placed or performed during the hospital encounter of 01/20/23   Comprehensive Metabolic Panel    Specimen: Arm, Right; Blood   Result Value Ref Range    Glucose 146 (H) 65 - 99 mg/dL    BUN 10 6 - 20 mg/dL    Creatinine 0.87 0.57 - 1.00 mg/dL    Sodium 137 136 - 145 mmol/L    Potassium 4.5 3.5 - 5.2 mmol/L    Chloride 103 98 - 107 mmol/L    CO2 27.2 22.0 - 29.0 mmol/L    Calcium 9.2 8.6 - 10.5 mg/dL    Total Protein 7.2 6.0 - 8.5 g/dL    Albumin 4.0 3.5 - 5.2 g/dL    ALT (SGPT) 25 1 - 33 U/L    AST (SGOT) 34 (H) 1 - 32 U/L    Alkaline Phosphatase 94 39 - 117 U/L     Total Bilirubin 0.4 0.0 - 1.2 mg/dL    Globulin 3.2 gm/dL    A/G Ratio 1.3 g/dL    BUN/Creatinine Ratio 11.5 7.0 - 25.0    Anion Gap 6.8 5.0 - 15.0 mmol/L    eGFR 84.4 >60.0 mL/min/1.73   Troponin    Specimen: Arm, Right; Blood   Result Value Ref Range    Troponin T <0.010 0.000 - 0.030 ng/mL   Troponin    Specimen: Arm, Right; Blood   Result Value Ref Range    Troponin T <0.010 0.000 - 0.030 ng/mL   CBC Auto Differential    Specimen: Arm, Right; Blood   Result Value Ref Range    WBC 9.04 3.40 - 10.80 10*3/mm3    RBC 4.30 3.77 - 5.28 10*6/mm3    Hemoglobin 12.8 12.0 - 15.9 g/dL    Hematocrit 37.6 34.0 - 46.6 %    MCV 87.4 79.0 - 97.0 fL    MCH 29.8 26.6 - 33.0 pg    MCHC 34.0 31.5 - 35.7 g/dL    RDW 12.9 12.3 - 15.4 %    RDW-SD 40.9 37.0 - 54.0 fl    MPV 9.2 6.0 - 12.0 fL    Platelets 261 140 - 450 10*3/mm3    Neutrophil % 50.3 42.7 - 76.0 %    Lymphocyte % 37.1 19.6 - 45.3 %    Monocyte % 8.4 5.0 - 12.0 %    Eosinophil % 2.8 0.3 - 6.2 %    Basophil % 1.0 0.0 - 1.5 %    Immature Grans % 0.4 0.0 - 0.5 %    Neutrophils, Absolute 4.55 1.70 - 7.00 10*3/mm3    Lymphocytes, Absolute 3.35 (H) 0.70 - 3.10 10*3/mm3    Monocytes, Absolute 0.76 0.10 - 0.90 10*3/mm3    Eosinophils, Absolute 0.25 0.00 - 0.40 10*3/mm3    Basophils, Absolute 0.09 0.00 - 0.20 10*3/mm3    Immature Grans, Absolute 0.04 0.00 - 0.05 10*3/mm3    nRBC 0.0 0.0 - 0.2 /100 WBC   ECG 12 Lead Chest Pain   Result Value Ref Range    QT Interval 364 ms    QTC Interval 452 ms   Green Top (Gel)   Result Value Ref Range    Extra Tube Hold for add-ons.    Lavender Top   Result Value Ref Range    Extra Tube hold for add-on    Gold Top - SST   Result Value Ref Range    Extra Tube Hold for add-ons.    Light Blue Top   Result Value Ref Range    Extra Tube Hold for add-ons.      XR Chest 1 View   Final Result   No acute cardiopulmonary findings.      Signer Name: Chris Liz MD    Signed: 1/20/2023 10:05 PM    Workstation Name: [id number]     [facility]          ED Course  ED Course as of 01/21/23 0130   Fri Jan 20, 2023   2101 EKG performed at 2044 shows sinus rhythm, rate 93.  GA interval 182, QRS duration 106, QTc 452 ms.  Some baseline artifact.  Nonspecific T wave changes.  No evidence for STEMI.  Electronically signed by Josue Rai MD, 01/20/23, 9:01 PM EST.   [CM]   Sat Jan 21, 2023   0124 Patient is feeling better and states her chest pain has resolved.  She is refusing admission for chest pain rule out.  She states that she feels better and has a lot to do tomorrow and really does not have the time to be admitted.  I discussed with her risks to leaving and advised her to return to the ED if her chest pain returns or worsens.  We will order an outpatient stress test and advised her to follow-up with cardiology. [MB]   0125 Heart score 4 [MB]      ED Course User Index  [CM] Josue Rai MD  [MB] Barbara Corado, APRN                                           [facility]    Final diagnoses:   Chest pain, unspecified type       ED Disposition  ED Disposition     ED Disposition   Discharge    Condition   Stable    Comment   --             Estella Ramirez MD  [address] [address]  [phone]    Call in 3 days           Medication List      No changes were made to your prescriptions during this visit.          Barbara Corado, VIJAYA  01/21/23 0130

## 2023-01-23 ENCOUNTER — TRANSCRIBE ORDERS (OUTPATIENT)
Dept: HOSPICE | Facility: HOSPITAL | Age: 43
End: 2023-01-23
Payer: COMMERCIAL

## 2023-01-23 DIAGNOSIS — R07.89 OTHER CHEST PAIN: Primary | ICD-10-CM

## 2023-08-04 ENCOUNTER — TRANSCRIBE ORDERS (OUTPATIENT)
Dept: ADMINISTRATIVE | Facility: HOSPITAL | Age: 43
End: 2023-08-04
Payer: COMMERCIAL

## 2023-08-04 DIAGNOSIS — G35 MULTIPLE SCLEROSIS: Primary | ICD-10-CM

## 2023-08-24 ENCOUNTER — HOSPITAL ENCOUNTER (OUTPATIENT)
Dept: MRI IMAGING | Facility: HOSPITAL | Age: 43
Discharge: HOME | End: 2023-08-24
Admitting: STUDENT IN AN ORGANIZED HEALTH CARE EDUCATION/TRAINING PROGRAM
Payer: COMMERCIAL

## 2023-08-24 DIAGNOSIS — G35 MULTIPLE SCLEROSIS: ICD-10-CM

## 2023-08-24 PROCEDURE — 70553 MRI BRAIN STEM W/O & W/DYE: CPT

## 2023-08-24 PROCEDURE — 0 GADOBENATE DIMEGLUMINE 529 MG/ML SOLUTION: Performed by: STUDENT IN AN ORGANIZED HEALTH CARE EDUCATION/TRAINING PROGRAM

## 2023-08-24 PROCEDURE — A9577 INJ MULTIHANCE: HCPCS | Performed by: STUDENT IN AN ORGANIZED HEALTH CARE EDUCATION/TRAINING PROGRAM

## 2023-08-24 RX ADMIN — GADOBENATE DIMEGLUMINE 20 ML: 529 INJECTION, SOLUTION INTRAVENOUS at 11:11

## 2023-09-07 ENCOUNTER — TRANSCRIBE ORDERS (OUTPATIENT)
Dept: ADMINISTRATIVE | Facility: HOSPITAL | Age: 43
End: 2023-09-07
Payer: COMMERCIAL

## 2023-09-07 DIAGNOSIS — K75.81 NONALCOHOLIC STEATOHEPATITIS: Primary | ICD-10-CM

## 2023-09-15 ENCOUNTER — HOSPITAL ENCOUNTER (OUTPATIENT)
Dept: ULTRASOUND IMAGING | Facility: HOSPITAL | Age: 43
Discharge: HOME | End: 2023-09-15
Payer: COMMERCIAL

## 2023-09-15 ENCOUNTER — TRANSCRIBE ORDERS (OUTPATIENT)
Dept: ADMINISTRATIVE | Facility: HOSPITAL | Age: 43
End: 2023-09-15
Payer: COMMERCIAL

## 2023-09-15 ENCOUNTER — LAB (OUTPATIENT)
Dept: LAB | Facility: HOSPITAL | Age: 43
End: 2023-09-15
Payer: COMMERCIAL

## 2023-09-15 DIAGNOSIS — K76.0 FATTY METAMORPHOSIS OF LIVER: Primary | ICD-10-CM

## 2023-09-15 DIAGNOSIS — K75.81 NONALCOHOLIC STEATOHEPATITIS: ICD-10-CM

## 2023-09-15 DIAGNOSIS — K76.0 FATTY METAMORPHOSIS OF LIVER: ICD-10-CM

## 2023-09-15 LAB
ALPHA1 GLOB MFR UR ELPH: 145 MG/DL (ref 90–200)
HBV CORE IGM SERPL QL IA: NORMAL

## 2023-09-15 PROCEDURE — 36415 COLL VENOUS BLD VENIPUNCTURE: CPT

## 2023-09-15 PROCEDURE — 76705 ECHO EXAM OF ABDOMEN: CPT

## 2023-09-15 PROCEDURE — 82103 ALPHA-1-ANTITRYPSIN TOTAL: CPT

## 2023-09-15 PROCEDURE — 86038 ANTINUCLEAR ANTIBODIES: CPT

## 2023-09-15 PROCEDURE — 86705 HEP B CORE ANTIBODY IGM: CPT

## 2023-09-15 PROCEDURE — 86381 MITOCHONDRIAL ANTIBODY EACH: CPT

## 2023-09-16 LAB — MITOCHONDRIA M2 IGG SER-ACNC: <20 UNITS (ref 0–20)

## 2023-09-18 LAB — ANA SER QL: NEGATIVE

## 2024-01-09 ENCOUNTER — APPOINTMENT (OUTPATIENT)
Dept: MRI IMAGING | Facility: HOSPITAL | Age: 44
End: 2024-01-09
Payer: COMMERCIAL

## 2024-01-09 ENCOUNTER — HOSPITAL ENCOUNTER (OUTPATIENT)
Facility: HOSPITAL | Age: 44
Setting detail: OBSERVATION
Discharge: HOME | End: 2024-01-10
Attending: STUDENT IN AN ORGANIZED HEALTH CARE EDUCATION/TRAINING PROGRAM | Admitting: INTERNAL MEDICINE
Payer: COMMERCIAL

## 2024-01-09 ENCOUNTER — APPOINTMENT (OUTPATIENT)
Dept: CT IMAGING | Facility: HOSPITAL | Age: 44
End: 2024-01-09
Payer: COMMERCIAL

## 2024-01-09 ENCOUNTER — APPOINTMENT (OUTPATIENT)
Dept: GENERAL RADIOLOGY | Facility: HOSPITAL | Age: 44
End: 2024-01-09
Payer: COMMERCIAL

## 2024-01-09 DIAGNOSIS — G35 MULTIPLE SCLEROSIS EXACERBATION: Primary | ICD-10-CM

## 2024-01-09 LAB
ALBUMIN SERPL-MCNC: 4.4 G/DL (ref 3.5–5.2)
ALBUMIN/GLOB SERPL: 1.3 G/DL
ALP SERPL-CCNC: 81 U/L (ref 39–117)
ALT SERPL W P-5'-P-CCNC: 20 U/L (ref 1–33)
ANION GAP SERPL CALCULATED.3IONS-SCNC: 11 MMOL/L (ref 5–15)
AST SERPL-CCNC: 33 U/L (ref 1–32)
BACTERIA UR QL AUTO: ABNORMAL /HPF
BASOPHILS # BLD AUTO: 0.1 10*3/MM3 (ref 0–0.2)
BASOPHILS NFR BLD AUTO: 1.4 % (ref 0–1.5)
BILIRUB SERPL-MCNC: 0.7 MG/DL (ref 0–1.2)
BILIRUB UR QL STRIP: NEGATIVE
BUN SERPL-MCNC: 9 MG/DL (ref 6–20)
BUN/CREAT SERPL: 9.7 (ref 7–25)
CALCIUM SPEC-SCNC: 9.5 MG/DL (ref 8.6–10.5)
CHLORIDE SERPL-SCNC: 103 MMOL/L (ref 98–107)
CLARITY UR: ABNORMAL
CO2 SERPL-SCNC: 25 MMOL/L (ref 22–29)
COLOR UR: YELLOW
CREAT SERPL-MCNC: 0.93 MG/DL (ref 0.57–1)
DEPRECATED RDW RBC AUTO: 39.8 FL (ref 37–54)
EGFRCR SERPLBLD CKD-EPI 2021: 77.4 ML/MIN/1.73
EOSINOPHIL # BLD AUTO: 0.15 10*3/MM3 (ref 0–0.4)
EOSINOPHIL NFR BLD AUTO: 2 % (ref 0.3–6.2)
ERYTHROCYTE [DISTWIDTH] IN BLOOD BY AUTOMATED COUNT: 12.6 % (ref 12.3–15.4)
GEN 5 2HR TROPONIN T REFLEX: <6 NG/L
GLOBULIN UR ELPH-MCNC: 3.3 GM/DL
GLUCOSE SERPL-MCNC: 95 MG/DL (ref 65–99)
GLUCOSE UR STRIP-MCNC: NEGATIVE MG/DL
HCT VFR BLD AUTO: 41.9 % (ref 34–46.6)
HGB BLD-MCNC: 14 G/DL (ref 12–15.9)
HGB UR QL STRIP.AUTO: ABNORMAL
HOLD SPECIMEN: NORMAL
HOLD SPECIMEN: NORMAL
HYALINE CASTS UR QL AUTO: ABNORMAL /LPF
IMM GRANULOCYTES # BLD AUTO: 0.03 10*3/MM3 (ref 0–0.05)
IMM GRANULOCYTES NFR BLD AUTO: 0.4 % (ref 0–0.5)
KETONES UR QL STRIP: NEGATIVE
LEUKOCYTE ESTERASE UR QL STRIP.AUTO: NEGATIVE
LYMPHOCYTES # BLD AUTO: 2.17 10*3/MM3 (ref 0.7–3.1)
LYMPHOCYTES NFR BLD AUTO: 29.4 % (ref 19.6–45.3)
MCH RBC QN AUTO: 28.8 PG (ref 26.6–33)
MCHC RBC AUTO-ENTMCNC: 33.4 G/DL (ref 31.5–35.7)
MCV RBC AUTO: 86.2 FL (ref 79–97)
MONOCYTES # BLD AUTO: 0.51 10*3/MM3 (ref 0.1–0.9)
MONOCYTES NFR BLD AUTO: 6.9 % (ref 5–12)
NEUTROPHILS NFR BLD AUTO: 4.42 10*3/MM3 (ref 1.7–7)
NEUTROPHILS NFR BLD AUTO: 59.9 % (ref 42.7–76)
NITRITE UR QL STRIP: NEGATIVE
NRBC BLD AUTO-RTO: 0 /100 WBC (ref 0–0.2)
PH UR STRIP.AUTO: 6 [PH] (ref 5–8)
PLATELET # BLD AUTO: 283 10*3/MM3 (ref 140–450)
PMV BLD AUTO: 8.9 FL (ref 6–12)
POTASSIUM SERPL-SCNC: 4 MMOL/L (ref 3.5–5.2)
PROT SERPL-MCNC: 7.7 G/DL (ref 6–8.5)
PROT UR QL STRIP: ABNORMAL
QT INTERVAL: 410 MS
QTC INTERVAL: 451 MS
RBC # BLD AUTO: 4.86 10*6/MM3 (ref 3.77–5.28)
RBC # UR STRIP: ABNORMAL /HPF
REF LAB TEST METHOD: ABNORMAL
SODIUM SERPL-SCNC: 139 MMOL/L (ref 136–145)
SP GR UR STRIP: 1.03 (ref 1–1.03)
SQUAMOUS #/AREA URNS HPF: ABNORMAL /HPF
TROPONIN T DELTA: NORMAL
TROPONIN T SERPL HS-MCNC: <6 NG/L
TSH SERPL DL<=0.05 MIU/L-ACNC: 5.78 UIU/ML (ref 0.27–4.2)
UROBILINOGEN UR QL STRIP: ABNORMAL
WBC # UR STRIP: ABNORMAL /HPF
WBC NRBC COR # BLD AUTO: 7.38 10*3/MM3 (ref 3.4–10.8)
WHOLE BLOOD HOLD COAG: NORMAL
WHOLE BLOOD HOLD SPECIMEN: NORMAL

## 2024-01-09 PROCEDURE — 0 GADOBENATE DIMEGLUMINE 529 MG/ML SOLUTION: Performed by: STUDENT IN AN ORGANIZED HEALTH CARE EDUCATION/TRAINING PROGRAM

## 2024-01-09 PROCEDURE — 80050 GENERAL HEALTH PANEL: CPT | Performed by: STUDENT IN AN ORGANIZED HEALTH CARE EDUCATION/TRAINING PROGRAM

## 2024-01-09 PROCEDURE — G0378 HOSPITAL OBSERVATION PER HR: HCPCS

## 2024-01-09 PROCEDURE — 70553 MRI BRAIN STEM W/O & W/DYE: CPT

## 2024-01-09 PROCEDURE — 99223 1ST HOSP IP/OBS HIGH 75: CPT | Performed by: INTERNAL MEDICINE

## 2024-01-09 PROCEDURE — 70450 CT HEAD/BRAIN W/O DYE: CPT

## 2024-01-09 PROCEDURE — 71045 X-RAY EXAM CHEST 1 VIEW: CPT

## 2024-01-09 PROCEDURE — 93010 ELECTROCARDIOGRAM REPORT: CPT | Performed by: INTERNAL MEDICINE

## 2024-01-09 PROCEDURE — 71045 X-RAY EXAM CHEST 1 VIEW: CPT | Performed by: RADIOLOGY

## 2024-01-09 PROCEDURE — 96372 THER/PROPH/DIAG INJ SC/IM: CPT

## 2024-01-09 PROCEDURE — 36415 COLL VENOUS BLD VENIPUNCTURE: CPT

## 2024-01-09 PROCEDURE — 25010000002 METHYLPREDNISOLONE PER 125 MG: Performed by: STUDENT IN AN ORGANIZED HEALTH CARE EDUCATION/TRAINING PROGRAM

## 2024-01-09 PROCEDURE — 70553 MRI BRAIN STEM W/O & W/DYE: CPT | Performed by: RADIOLOGY

## 2024-01-09 PROCEDURE — 25010000002 ENOXAPARIN PER 10 MG: Performed by: INTERNAL MEDICINE

## 2024-01-09 PROCEDURE — 96365 THER/PROPH/DIAG IV INF INIT: CPT

## 2024-01-09 PROCEDURE — 99285 EMERGENCY DEPT VISIT HI MDM: CPT

## 2024-01-09 PROCEDURE — 93005 ELECTROCARDIOGRAM TRACING: CPT | Performed by: STUDENT IN AN ORGANIZED HEALTH CARE EDUCATION/TRAINING PROGRAM

## 2024-01-09 PROCEDURE — A9577 INJ MULTIHANCE: HCPCS | Performed by: STUDENT IN AN ORGANIZED HEALTH CARE EDUCATION/TRAINING PROGRAM

## 2024-01-09 PROCEDURE — 81001 URINALYSIS AUTO W/SCOPE: CPT | Performed by: INTERNAL MEDICINE

## 2024-01-09 PROCEDURE — 84484 ASSAY OF TROPONIN QUANT: CPT | Performed by: STUDENT IN AN ORGANIZED HEALTH CARE EDUCATION/TRAINING PROGRAM

## 2024-01-09 PROCEDURE — 70450 CT HEAD/BRAIN W/O DYE: CPT | Performed by: RADIOLOGY

## 2024-01-09 RX ORDER — DEXAMETHASONE SODIUM PHOSPHATE 10 MG/ML
5 INJECTION, SOLUTION INTRAMUSCULAR; INTRAVENOUS ONCE
Status: DISCONTINUED | OUTPATIENT
Start: 2024-01-09 | End: 2024-01-09

## 2024-01-09 RX ORDER — ENOXAPARIN SODIUM 100 MG/ML
40 INJECTION SUBCUTANEOUS DAILY
Status: DISCONTINUED | OUTPATIENT
Start: 2024-01-09 | End: 2024-01-10 | Stop reason: HOSPADM

## 2024-01-09 RX ORDER — AMOXICILLIN 250 MG
2 CAPSULE ORAL 2 TIMES DAILY
Status: DISCONTINUED | OUTPATIENT
Start: 2024-01-09 | End: 2024-01-10 | Stop reason: HOSPADM

## 2024-01-09 RX ORDER — POLYETHYLENE GLYCOL 3350 17 G/17G
17 POWDER, FOR SOLUTION ORAL DAILY PRN
Status: DISCONTINUED | OUTPATIENT
Start: 2024-01-09 | End: 2024-01-10 | Stop reason: HOSPADM

## 2024-01-09 RX ORDER — KETOROLAC TROMETHAMINE 30 MG/ML
30 INJECTION, SOLUTION INTRAMUSCULAR; INTRAVENOUS ONCE
Status: DISCONTINUED | OUTPATIENT
Start: 2024-01-09 | End: 2024-01-09

## 2024-01-09 RX ORDER — SODIUM CHLORIDE 0.9 % (FLUSH) 0.9 %
10 SYRINGE (ML) INJECTION AS NEEDED
Status: DISCONTINUED | OUTPATIENT
Start: 2024-01-09 | End: 2024-01-10 | Stop reason: HOSPADM

## 2024-01-09 RX ORDER — ASPIRIN 81 MG/1
324 TABLET, CHEWABLE ORAL ONCE
Status: COMPLETED | OUTPATIENT
Start: 2024-01-09 | End: 2024-01-09

## 2024-01-09 RX ORDER — BISACODYL 5 MG/1
5 TABLET, DELAYED RELEASE ORAL DAILY PRN
Status: DISCONTINUED | OUTPATIENT
Start: 2024-01-09 | End: 2024-01-10 | Stop reason: HOSPADM

## 2024-01-09 RX ORDER — DEXAMETHASONE SODIUM PHOSPHATE 4 MG/ML
5 INJECTION, SOLUTION INTRA-ARTICULAR; INTRALESIONAL; INTRAMUSCULAR; INTRAVENOUS; SOFT TISSUE ONCE
Status: DISCONTINUED | OUTPATIENT
Start: 2024-01-09 | End: 2024-01-09

## 2024-01-09 RX ORDER — BISACODYL 10 MG
10 SUPPOSITORY, RECTAL RECTAL DAILY PRN
Status: DISCONTINUED | OUTPATIENT
Start: 2024-01-09 | End: 2024-01-10 | Stop reason: HOSPADM

## 2024-01-09 RX ORDER — SODIUM CHLORIDE 0.9 % (FLUSH) 0.9 %
10 SYRINGE (ML) INJECTION EVERY 12 HOURS SCHEDULED
Status: DISCONTINUED | OUTPATIENT
Start: 2024-01-09 | End: 2024-01-10 | Stop reason: HOSPADM

## 2024-01-09 RX ORDER — NITROGLYCERIN 0.4 MG/1
0.4 TABLET SUBLINGUAL
Status: DISCONTINUED | OUTPATIENT
Start: 2024-01-09 | End: 2024-01-10 | Stop reason: HOSPADM

## 2024-01-09 RX ORDER — SODIUM CHLORIDE 9 MG/ML
40 INJECTION, SOLUTION INTRAVENOUS AS NEEDED
Status: DISCONTINUED | OUTPATIENT
Start: 2024-01-09 | End: 2024-01-10 | Stop reason: HOSPADM

## 2024-01-09 RX ADMIN — GADOBENATE DIMEGLUMINE 20 ML: 529 INJECTION, SOLUTION INTRAVENOUS at 16:50

## 2024-01-09 RX ADMIN — ENOXAPARIN SODIUM 40 MG: 40 INJECTION SUBCUTANEOUS at 22:12

## 2024-01-09 RX ADMIN — Medication 10 ML: at 22:11

## 2024-01-09 RX ADMIN — ASPIRIN 324 MG: 81 TABLET, CHEWABLE ORAL at 11:14

## 2024-01-09 RX ADMIN — SODIUM CHLORIDE 1000 MG: 9 INJECTION, SOLUTION INTRAVENOUS at 17:43

## 2024-01-09 NOTE — ED PROVIDER NOTES
Subjective   History of Present Illness  43-year-old female with past medical history of multiple sclerosis and hypothyroidism presents to the ER due to concerns for left upper and left lower extremity weakness for the past 7 days.  Patient noted developing numbness in the left hand.  Patient also noted decreased  strength left hand.  Patient denied considerable weakness at the left shoulder.  Patient noted her left lower extremity appears weaker than usual as well.  Patient notes slight difficulty with walking.  No dysarthria.  No dysphagia.  Vision changes.  No headache.  Vital signs stable.  Afebrile      Review of Systems   Neurological:  Positive for weakness and numbness.   All other systems reviewed and are negative.      Past Medical History:   Diagnosis Date    Disease of thyroid gland     Multiple sclerosis        No Known Allergies    Past Surgical History:   Procedure Laterality Date    CHOLECYSTECTOMY      ENDOMETRIAL ABLATION         Family History   Problem Relation Age of Onset    Breast cancer Maternal Aunt        Social History     Socioeconomic History    Marital status:    Tobacco Use    Smoking status: Never   Substance and Sexual Activity    Alcohol use: No    Drug use: No    Sexual activity: Yes     Partners: Male           Objective   Physical Exam  Constitutional:       General: She is not in acute distress.     Appearance: Normal appearance. She is not ill-appearing.   HENT:      Head: Normocephalic and atraumatic.      Right Ear: External ear normal.      Left Ear: External ear normal.      Nose: Nose normal.      Mouth/Throat:      Mouth: Mucous membranes are moist.   Eyes:      Extraocular Movements: Extraocular movements intact.      Pupils: Pupils are equal, round, and reactive to light.   Cardiovascular:      Rate and Rhythm: Normal rate and regular rhythm.      Heart sounds: No murmur heard.  Pulmonary:      Effort: Pulmonary effort is normal. No respiratory distress.       Breath sounds: Normal breath sounds. No wheezing.   Abdominal:      General: Bowel sounds are normal.      Palpations: Abdomen is soft.      Tenderness: There is no abdominal tenderness.   Musculoskeletal:         General: No deformity or signs of injury. Normal range of motion.      Cervical back: Normal range of motion and neck supple.   Skin:     General: Skin is warm and dry.      Findings: No erythema.   Neurological:      General: No focal deficit present.      Mental Status: She is alert and oriented to person, place, and time. Mental status is at baseline.      Cranial Nerves: No cranial nerve deficit.      Comments: Decreased strength at the left wrist and left hand.  Patient does appear to have normal strength of the left bicep.  Left lower extremity appears weaker than the right.  Patient does have a gait favoring the left lower extremity.  No obvious facial deficit.  No dysarthria.  Visual field intact   Psychiatric:         Mood and Affect: Mood normal.         Behavior: Behavior normal.         Thought Content: Thought content normal.         Procedures           ED Course  ED Course as of 01/09/24 1956 Tue Jan 09, 2024   1118 EKG notes sinus rhythm.  73 bpm.  Incomplete right bundle branch block noted.  No acute ST elevation.  QTc 451.  Electronically signed by Kolby Herrmann DO, 01/09/24, 11:18 AM EST.   [SF]   1834 Delay in obtaining MRI due to coinciding stroke alert presenting to the ER that required an MRI of the brain [SF]   1905 Care of this patient was transferred to the next attending physician at shift change.  Complete discussion of presentation, labs, imaging, care, and expected course occurred during transition of providers.  Vitals stable at transfer of care.    Electronically signed by Kolby Herrmann DO, 01/09/24, 7:06 PM EST.   [SF]   1921 Patient care taken over at shift change from Dr. Herrmann.  Patient has no clinical improvement after thousand of Solu-Medrol.  Recommendations were  made to admit patient to the hospital if no clinical improvement for continuous steroids.  Electronically signed by Helga Roberto DO, 01/09/24, 7:21 PM EST.  ' [LK]   1955 Patient informs me that she is actually not seen a dedicated neurologist for several years.  She reports that she has never had a MS flare like this before in her life.  She reports that usually is just her left hand tingling and occasional numbness and weakness.  These symptoms started on Thursday and yesterday, Monday, after her colonoscopy when she woke up from anesthesia symptoms had profoundly been exacerbated.  She follows with [facility] for a PCP/ OB/GYN care.  She states that she has an appointment with Dr. Maher and Dr. Kennedy with [facility] neurology in March.  Patient has been accepted by Dr. Desai for admission  Electronically signed by Helga Roberto DO, 01/09/24, 7:56 PM EST.   [LK]      ED Course User Index  [LK] Helga Roberto DO  [SF] Kolby Herrmann DO      MRI Brain With & Without Contrast    Result Date: 1/9/2024    No enhancing lesions identified. There are however periventricular white matter T2 lesions consistent with MS. Not markedly different since 8/24/2023.   This report was finalized on 1/9/2024 5:03 PM by Dr. Gaurav Connor MD.      CT Head Without Contrast    Result Date: 1/9/2024    No acute findings in the head/brain.   This report was finalized on 1/9/2024 12:19 PM by Dr. Gaurav Connor MD.      XR Chest 1 View    Result Date: 1/9/2024    Unremarkable exam. No acute cardiopulmonary findings identified.   This report was finalized on 1/9/2024 12:10 PM by Dr. Gaurav Connor MD.         Results for orders placed or performed during the hospital encounter of 01/09/24   Comprehensive Metabolic Panel    Specimen: Arm, Left; Blood   Result Value Ref Range    Glucose 95 65 - 99 mg/dL    BUN 9 6 - 20 mg/dL    Creatinine 0.93 0.57 - 1.00 mg/dL    Sodium 139 136 - 145 mmol/L    Potassium 4.0 3.5 - 5.2 mmol/L    Chloride  103 98 - 107 mmol/L    CO2 25.0 22.0 - 29.0 mmol/L    Calcium 9.5 8.6 - 10.5 mg/dL    Total Protein 7.7 6.0 - 8.5 g/dL    Albumin 4.4 3.5 - 5.2 g/dL    ALT (SGPT) 20 1 - 33 U/L    AST (SGOT) 33 (H) 1 - 32 U/L    Alkaline Phosphatase 81 39 - 117 U/L    Total Bilirubin 0.7 0.0 - 1.2 mg/dL    Globulin 3.3 gm/dL    A/G Ratio 1.3 g/dL    BUN/Creatinine Ratio 9.7 7.0 - 25.0    Anion Gap 11.0 5.0 - 15.0 mmol/L    eGFR 77.4 >60.0 mL/min/1.73   High Sensitivity Troponin T    Specimen: Arm, Left; Blood   Result Value Ref Range    HS Troponin T <6 <14 ng/L   CBC Auto Differential    Specimen: Arm, Left; Blood   Result Value Ref Range    WBC 7.38 3.40 - 10.80 10*3/mm3    RBC 4.86 3.77 - 5.28 10*6/mm3    Hemoglobin 14.0 12.0 - 15.9 g/dL    Hematocrit 41.9 34.0 - 46.6 %    MCV 86.2 79.0 - 97.0 fL    MCH 28.8 26.6 - 33.0 pg    MCHC 33.4 31.5 - 35.7 g/dL    RDW 12.6 12.3 - 15.4 %    RDW-SD 39.8 37.0 - 54.0 fl    MPV 8.9 6.0 - 12.0 fL    Platelets 283 140 - 450 10*3/mm3    Neutrophil % 59.9 42.7 - 76.0 %    Lymphocyte % 29.4 19.6 - 45.3 %    Monocyte % 6.9 5.0 - 12.0 %    Eosinophil % 2.0 0.3 - 6.2 %    Basophil % 1.4 0.0 - 1.5 %    Immature Grans % 0.4 0.0 - 0.5 %    Neutrophils, Absolute 4.42 1.70 - 7.00 10*3/mm3    Lymphocytes, Absolute 2.17 0.70 - 3.10 10*3/mm3    Monocytes, Absolute 0.51 0.10 - 0.90 10*3/mm3    Eosinophils, Absolute 0.15 0.00 - 0.40 10*3/mm3    Basophils, Absolute 0.10 0.00 - 0.20 10*3/mm3    Immature Grans, Absolute 0.03 0.00 - 0.05 10*3/mm3    nRBC 0.0 0.0 - 0.2 /100 WBC   High Sensitivity Troponin T 2Hr    Specimen: Arm, Right; Blood   Result Value Ref Range    HS Troponin T <6 <14 ng/L    Troponin T Delta     ECG 12 Lead Chest Pain   Result Value Ref Range    QT Interval 410 ms    QTC Interval 451 ms   Green Top (Gel)   Result Value Ref Range    Extra Tube Hold for add-ons.    Lavender Top   Result Value Ref Range    Extra Tube hold for add-on    Gold Top - SST   Result Value Ref Range    Extra Tube Hold  for add-ons.    Light Blue Top   Result Value Ref Range    Extra Tube Hold for add-ons.                                             Medical Decision Making  CBC and CMP are unremarkable.  Initial head CT without contrast notes no acute abnormality.  MRI of the brain was ordered with and without contrast.  MRI of the brain noted findings consistent with the patient's previous MS diagnosis but noted no new findings or acute findings consistent with MS flare.  Neurology was consulted.  Dr. Kirby recommend giving 1 g of Solu-Medrol IV.  They noted the patient's symptoms improved, the patient to be discharged home.    Problems Addressed:  Multiple sclerosis exacerbation: complicated acute illness or injury    Amount and/or Complexity of Data Reviewed  Labs: ordered.  Radiology: ordered.  ECG/medicine tests: ordered.    Risk  OTC drugs.  Prescription drug management.        Final diagnoses:   Multiple sclerosis exacerbation       ED Disposition  ED Disposition       ED Disposition   Intended Admit    Condition   --    Comment   --               No follow-up provider specified.       Medication List      No changes were made to your prescriptions during this visit.            Helga Roberto DO  01/09/24 1922       Helga Roberto DO  01/09/24 1956

## 2024-01-09 NOTE — ED NOTES
On arrival to treatment room, pt reports has developed chest pain. EKG obtained, provider made aware.

## 2024-01-10 VITALS
OXYGEN SATURATION: 96 % | HEART RATE: 106 BPM | DIASTOLIC BLOOD PRESSURE: 90 MMHG | TEMPERATURE: 97.8 F | BODY MASS INDEX: 34.44 KG/M2 | SYSTOLIC BLOOD PRESSURE: 133 MMHG | WEIGHT: 246 LBS | HEIGHT: 71 IN | RESPIRATION RATE: 18 BRPM

## 2024-01-10 LAB
ALBUMIN SERPL-MCNC: 4.3 G/DL (ref 3.5–5.2)
ALBUMIN/GLOB SERPL: 1.2 G/DL
ALP SERPL-CCNC: 92 U/L (ref 39–117)
ALT SERPL W P-5'-P-CCNC: 27 U/L (ref 1–33)
ANION GAP SERPL CALCULATED.3IONS-SCNC: 14.6 MMOL/L (ref 5–15)
AST SERPL-CCNC: 36 U/L (ref 1–32)
BASOPHILS # BLD AUTO: 0.02 10*3/MM3 (ref 0–0.2)
BASOPHILS NFR BLD AUTO: 0.3 % (ref 0–1.5)
BILIRUB SERPL-MCNC: 0.5 MG/DL (ref 0–1.2)
BUN SERPL-MCNC: 10 MG/DL (ref 6–20)
BUN/CREAT SERPL: 9.8 (ref 7–25)
CALCIUM SPEC-SCNC: 9.1 MG/DL (ref 8.6–10.5)
CHLORIDE SERPL-SCNC: 103 MMOL/L (ref 98–107)
CO2 SERPL-SCNC: 21.4 MMOL/L (ref 22–29)
CREAT SERPL-MCNC: 1.02 MG/DL (ref 0.57–1)
DEPRECATED RDW RBC AUTO: 37.8 FL (ref 37–54)
EGFRCR SERPLBLD CKD-EPI 2021: 69.3 ML/MIN/1.73
EOSINOPHIL # BLD AUTO: 0 10*3/MM3 (ref 0–0.4)
EOSINOPHIL NFR BLD AUTO: 0 % (ref 0.3–6.2)
ERYTHROCYTE [DISTWIDTH] IN BLOOD BY AUTOMATED COUNT: 12.3 % (ref 12.3–15.4)
GLOBULIN UR ELPH-MCNC: 3.5 GM/DL
GLUCOSE SERPL-MCNC: 277 MG/DL (ref 65–99)
HCT VFR BLD AUTO: 41.5 % (ref 34–46.6)
HGB BLD-MCNC: 14.3 G/DL (ref 12–15.9)
IMM GRANULOCYTES # BLD AUTO: 0.04 10*3/MM3 (ref 0–0.05)
IMM GRANULOCYTES NFR BLD AUTO: 0.6 % (ref 0–0.5)
LYMPHOCYTES # BLD AUTO: 0.83 10*3/MM3 (ref 0.7–3.1)
LYMPHOCYTES NFR BLD AUTO: 11.6 % (ref 19.6–45.3)
MCH RBC QN AUTO: 29.2 PG (ref 26.6–33)
MCHC RBC AUTO-ENTMCNC: 34.5 G/DL (ref 31.5–35.7)
MCV RBC AUTO: 84.7 FL (ref 79–97)
MONOCYTES # BLD AUTO: 0.02 10*3/MM3 (ref 0.1–0.9)
MONOCYTES NFR BLD AUTO: 0.3 % (ref 5–12)
NEUTROPHILS NFR BLD AUTO: 6.22 10*3/MM3 (ref 1.7–7)
NEUTROPHILS NFR BLD AUTO: 87.2 % (ref 42.7–76)
NRBC BLD AUTO-RTO: 0 /100 WBC (ref 0–0.2)
PLATELET # BLD AUTO: 313 10*3/MM3 (ref 140–450)
PMV BLD AUTO: 9.6 FL (ref 6–12)
POTASSIUM SERPL-SCNC: 3.8 MMOL/L (ref 3.5–5.2)
PROT SERPL-MCNC: 7.8 G/DL (ref 6–8.5)
RBC # BLD AUTO: 4.9 10*6/MM3 (ref 3.77–5.28)
SODIUM SERPL-SCNC: 139 MMOL/L (ref 136–145)
T4 FREE SERPL-MCNC: 1.03 NG/DL (ref 0.93–1.7)
WBC NRBC COR # BLD AUTO: 7.13 10*3/MM3 (ref 3.4–10.8)

## 2024-01-10 PROCEDURE — 99204 OFFICE O/P NEW MOD 45 MIN: CPT | Performed by: NURSE PRACTITIONER

## 2024-01-10 PROCEDURE — 84439 ASSAY OF FREE THYROXINE: CPT | Performed by: INTERNAL MEDICINE

## 2024-01-10 PROCEDURE — G0378 HOSPITAL OBSERVATION PER HR: HCPCS

## 2024-01-10 PROCEDURE — 85025 COMPLETE CBC W/AUTO DIFF WBC: CPT | Performed by: INTERNAL MEDICINE

## 2024-01-10 PROCEDURE — 25010000002 METHYLPREDNISOLONE PER 125 MG: Performed by: INTERNAL MEDICINE

## 2024-01-10 PROCEDURE — 80053 COMPREHEN METABOLIC PANEL: CPT | Performed by: INTERNAL MEDICINE

## 2024-01-10 RX ORDER — ERGOCALCIFEROL 1.25 MG/1
50000 CAPSULE ORAL WEEKLY
COMMUNITY
End: 2024-01-18 | Stop reason: HOSPADM

## 2024-01-10 RX ORDER — PREDNISONE 10 MG/1
TABLET ORAL
Qty: 70 TABLET | Refills: 0 | Status: SHIPPED | OUTPATIENT
Start: 2024-01-11 | End: 2024-01-18 | Stop reason: HOSPADM

## 2024-01-10 RX ORDER — LEVOTHYROXINE SODIUM 88 UG/1
88 TABLET ORAL
Status: DISCONTINUED | OUTPATIENT
Start: 2024-01-10 | End: 2024-01-10 | Stop reason: HOSPADM

## 2024-01-10 RX ORDER — LEVOTHYROXINE SODIUM 88 UG/1
88 TABLET ORAL DAILY
COMMUNITY

## 2024-01-10 RX ORDER — OMEPRAZOLE 20 MG/1
20 CAPSULE, DELAYED RELEASE ORAL DAILY
Qty: 30 CAPSULE | Refills: 0 | Status: SHIPPED | OUTPATIENT
Start: 2024-01-10

## 2024-01-10 RX ADMIN — Medication 10 ML: at 08:35

## 2024-01-10 RX ADMIN — SODIUM CHLORIDE 1000 MG: 9 INJECTION, SOLUTION INTRAVENOUS at 05:52

## 2024-01-10 RX ADMIN — LEVOTHYROXINE SODIUM 88 MCG: 88 TABLET ORAL at 10:29

## 2024-01-10 NOTE — ACP (ADVANCE CARE PLANNING)
explained scope of Living Will document and left blank form for patient's further consideration as requested.

## 2024-01-10 NOTE — H&P
[facility] Medicine Services  HISTORY & PHYSICAL    Patient Identification:  Name:  Starr Armenta  Age:  43 y.o.  Sex:  female  :  1980  MRN:  [id number]   Visit Number:  [id number]  Admit Date: 2024   Primary Care Physician:  Alexandr Corbin PA-C     Subjective     Chief complaint:   Left sided weakness      History of presenting illness:   Patient is a 43 y.o. female with past medical history significant for MS and hypothyroidism that presented to the [facility] emergency department for evaluation of left upper and lower extremity weakness.     Examined at bedside in ED.  Patient states her symptoms began about 5 days ago.  She states it started has some weakness in her left hand and has progressed to weakness in the home left upper extremity and left lower extremity.  She states she had a colonoscopy yesterday, and feels like her symptoms have worsened since then.  She states she is still able to move her left extremities, but it is much more difficult and she has to focus intensely on using them properly.  She states that she has had MS flares in the past, but none this severe.  She states she typically just has some mild left hand weakness.  Patient states her symptoms have been basically stable and nonprogressive over the past 20 years since diagnosis.  She states she typically just has some numbness/tingling of the left extremities.  She does not take any medications for her MS or have a neurologist at this time.  She states her PCP has been trying to set her up with a neurologist, and she just now got an appointment scheduled for a new neurologist in [place].  She denies difficulty swallowing, speech difficulty, facial numbness or asymmetry.  Denies any visual changes. She states she is able to walk and use her leg, but she feels as if she has to drag her left leg.  Denies dysuria, cough, shortness of breath, fever, chills, or any other symptoms.  She  states her symptoms have not improved since receiving the steroids in the ED.    Upon arrival to the ED, vitals were temperature 87, HR 75, RR 20, /80, SpO2 99% on room air.  Labs overall unremarkable.  CXR unremarkable.  CT head with no acute findings.  MRI brain shows no enhancing lesions.  There are periventricular white matter T2 lesions consistent with MS.  Not markedly different since 8/24/2023.    Patient has been admitted to the Telemetry unit.   ---------------------------------------------------------------------------------------------------------------------   Review of Systems   Constitutional:  Negative for chills, diaphoresis, fatigue and fever.   HENT:  Negative for trouble swallowing.    Respiratory:  Negative for cough, shortness of breath and wheezing.    Cardiovascular:  Negative for chest pain, palpitations and leg swelling.   Gastrointestinal:  Negative for abdominal pain, nausea and vomiting.   Genitourinary:  Negative for difficulty urinating, dysuria and flank pain.   Musculoskeletal:  Positive for gait problem. Negative for arthralgias, myalgias and neck stiffness.   Skin:  Negative for rash and wound.   Neurological:  Positive for weakness and numbness. Negative for dizziness, syncope, facial asymmetry, speech difficulty and light-headedness.   Psychiatric/Behavioral:  Negative for agitation and confusion.       ---------------------------------------------------------------------------------------------------------------------   Past Medical History:   Diagnosis Date    Disease of thyroid gland     Multiple sclerosis      Past Surgical History:   Procedure Laterality Date    CHOLECYSTECTOMY      ENDOMETRIAL ABLATION       Family History   Problem Relation Age of Onset    Breast cancer Maternal Aunt      Social History     Socioeconomic History    Marital status:    Tobacco Use    Smoking status: Never   Substance and Sexual Activity    Alcohol use: No    Drug use: No     Sexual activity: Yes     Partners: Male     ---------------------------------------------------------------------------------------------------------------------   Allergies:  Patient has no known allergies.  ---------------------------------------------------------------------------------------------------------------------   Medications below are reported home medications pulling from within the system; at this time, these medications have not been reconciled unless otherwise specified and are in the verification process for further verifcation as current home medications.    Prior to Admission Medications       Prescriptions Last Dose Informant Patient Reported? Taking?    levothyroxine (SYNTHROID, LEVOTHROID) 100 MCG tablet   Yes No    Take 100 mcg by mouth Daily.          ---------------------------------------------------------------------------------------------------------------------    Objective     Hospital Scheduled Meds:          Current listed hospital scheduled medications may not yet reflect those currently placed in orders that are signed and held, awaiting patient's arrival to floor/unit.    ---------------------------------------------------------------------------------------------------------------------   Vital Signs:  Temp:  [97 °F (36.1 °C)] 97 °F (36.1 °C)  Heart Rate:  [68-87] 82  Resp:  [14-20] 14  BP: (119-167)/() 143/86  Mean Arterial Pressure (Non-Invasive) for the past 24 hrs (Last 3 readings):   Noninvasive MAP (mmHg)   01/09/24 2055 108   01/09/24 2029 103   01/09/24 2014 102     SpO2 Percentage    01/09/24 2014 01/09/24 2029 01/09/24 2055   SpO2: 98% 96% 97%     SpO2:  [96 %-100 %] 97 %  on   ;   Device (Oxygen Therapy): room air    Body mass index is 34.17 kg/m².  Wt Readings from Last 3 Encounters:   01/09/24 111 kg (245 lb)   07/12/23 116 kg (255 lb)   01/20/23 116 kg (255 lb)      ---------------------------------------------------------------------------------------------------------------------   Physical Exam:  Physical Exam  Constitutional:       General: She is not in acute distress.     Appearance: Normal appearance.   HENT:      Head: Normocephalic and atraumatic.      Right Ear: External ear normal.      Left Ear: External ear normal.      Nose: No nasal deformity.      Mouth/Throat:      Lips: Pink.      Mouth: Mucous membranes are moist.   Eyes:      Conjunctiva/sclera: Conjunctivae normal.      Pupils: Pupils are equal, round, and reactive to light.   Cardiovascular:      Rate and Rhythm: Normal rate and regular rhythm.      Heart sounds: Normal heart sounds.   Pulmonary:      Effort: Pulmonary effort is normal.      Breath sounds: Normal breath sounds. No wheezing, rhonchi or rales.   Abdominal:      General: Abdomen is flat. Bowel sounds are normal.      Palpations: Abdomen is soft.      Tenderness: There is no guarding or rebound.   Genitourinary:     Comments: No harrison catheter in place   Musculoskeletal:      Cervical back: Neck supple. Normal range of motion.      Right lower leg: No edema.      Left lower leg: No edema.   Skin:     General: Skin is warm and dry.   Neurological:      General: No focal deficit present.      Mental Status: She is alert and oriented to person, place, and time.      Cranial Nerves: No dysarthria or facial asymmetry.      Sensory: No sensory deficit.      Motor: No tremor or pronator drift.      Comments: Equal strength of lower extremities  Left wrist/hand function with psychomotor slowing  compared to right.  Patient does have strength of the left wrist/hand, however is very slow to get to full strength.   Psychiatric:         Mood and Affect: Mood normal.         Behavior: Behavior normal.       ---------------------------------------------------------------------------------------------------------------------  EKG:  normal sinus rhythm, HR  "75.  QTc 451.  No acute ischemic changes.          I have personally reviewed the EKG/Telemetry strip  ---------------------------------------------------------------------------------------------------------------------   Results from last 7 days   Lab Units 01/09/24  1305 01/09/24  1102   HSTROP T ng/L <6 <6           Results from last 7 days   Lab Units 01/09/24  1102   WBC 10*3/mm3 7.38   HEMOGLOBIN g/dL 14.0   HEMATOCRIT % 41.9   MCV fL 86.2   MCHC g/dL 33.4   PLATELETS 10*3/mm3 283     Results from last 7 days   Lab Units 01/09/24  1102   SODIUM mmol/L 139   POTASSIUM mmol/L 4.0   CHLORIDE mmol/L 103   CO2 mmol/L 25.0   BUN mg/dL 9   CREATININE mg/dL 0.93   CALCIUM mg/dL 9.5   GLUCOSE mg/dL 95   ALBUMIN g/dL 4.4   BILIRUBIN mg/dL 0.7   ALK PHOS U/L 81   AST (SGOT) U/L 33*   ALT (SGPT) U/L 20   Estimated Creatinine Clearance: 104.8 mL/min (by C-G formula based on SCr of 0.93 mg/dL).  No results found for: \"AMMONIA\"    No results found for: \"HGBA1C\", \"POCGLU\"  No results found for: \"HGBA1C\"  Lab Results   Component Value Date    TSH 5.530 (H) 01/14/2020       No results found for: \"BLOODCX\"  No results found for: \"URINECX\"  No results found for: \"WOUNDCX\"  No results found for: \"STOOLCX\"  No results found for: \"RESPCX\"  No results found for: \"MRSACX\"  Pain Management Panel           No data to display              I have personally reviewed the above laboratory results.   ---------------------------------------------------------------------------------------------------------------------  Imaging Results (Last 7 Days)       Procedure Component Value Units Date/Time    MRI Brain With & Without Contrast [[id number]] Collected: 01/09/24 1701     Updated: 01/09/24 1705    Narrative:      EXAM:    MR Head Without and With Intravenous Contrast     EXAM DATE:    1/9/2024 4:34 PM     CLINICAL HISTORY:    MS Flare     TECHNIQUE:    Magnetic resonance images of the head/brain without and with  intravenous contrast in " multiple planes.     COMPARISON:    No relevant prior studies available.     FINDINGS:    BRAIN AND EXTRA-AXIAL SPACES:  Unremarkable as visualized.  No  hemorrhage.  No enhancing lesions identified. There are however  periventricular white matter T2 lesions consistent with MS. Not markedly  different since 8/24/2023.    BONES/JOINTS:  See above.    SINUSES:  Unremarkable as visualized.  No acute sinusitis.    MASTOID AIR CELLS:  Unremarkable as visualized.  No mastoid effusion.    ORBITS:  Unremarkable as visualized.       Impression:        No enhancing lesions identified. There are however periventricular  white matter T2 lesions consistent with MS. Not markedly different since  8/24/2023.        This report was finalized on 1/9/2024 5:03 PM by Dr. Gaurav Connor MD.       CT Head Without Contrast [[id number]] Collected: 01/09/24 1219     Updated: 01/09/24 1221    Narrative:      EXAM:    CT Head Without Intravenous Contrast     EXAM DATE:    1/9/2024 11:29 AM     CLINICAL HISTORY:    Transient ischemic attack (TIA)     TECHNIQUE:    Axial computed tomography images of the head/brain without intravenous  contrast.  Sagittal and coronal reformatted images were created and  reviewed.  This CT exam was performed using one or more of the following  dose reduction techniques:  automated exposure control, adjustment of  the mA and/or kV according to patient size, and/or use of iterative  reconstruction technique.     COMPARISON:    No relevant prior studies available.     FINDINGS:    BRAIN AND EXTRA-AXIAL SPACES:  Unremarkable as visualized.  No  hemorrhage.  No significant white matter disease.  No edema.  No  ventriculomegaly.    BONES/JOINTS:  Unremarkable as visualized.  No acute fracture.    SOFT TISSUES:  Unremarkable as visualized.    SINUSES:  Left maxillary sinus mucous retention cyst.    MASTOID AIR CELLS:  Unremarkable as visualized.  No mastoid effusion.       Impression:        No acute findings in the  head/brain.        This report was finalized on 1/9/2024 12:19 PM by Dr. Gaurav Connor MD.       XR Chest 1 View [[id number]] Collected: 01/09/24 1210     Updated: 01/09/24 1213    Narrative:      EXAM:    XR Chest, 1 View     EXAM DATE:    1/9/2024 10:53 AM     CLINICAL HISTORY:    Chest Pain Protocol     TECHNIQUE:    Frontal view of the chest.     COMPARISON:    January 20, 2023     FINDINGS:    LUNGS AND PLEURAL SPACES:  Unremarkable as visualized.  No  consolidation.  No pneumothorax.    HEART:  Unremarkable as visualized.  No cardiomegaly.    MEDIASTINUM:  Unremarkable as visualized.    BONES/JOINTS:  Unremarkable as visualized.       Impression:        Unremarkable exam. No acute cardiopulmonary findings identified.        This report was finalized on 1/9/2024 12:10 PM by Dr. Gaurav Connor MD.             I have personally reviewed the above radiology results.     Last Echocardiogram:    ---------------------------------------------------------------------------------------------------------------------    Assessment & Plan      Active Hospital Problems    Diagnosis  POA    **MS (multiple sclerosis) [G35]  Yes     Multiple sclerosis, in acute flare, POA  Left upper and lower extremity weakness, POA  MRI brain with white matter T2 lesions consistent with MS not markedly different since 8/24/2023.   ED provider discussed patient case with teleneurology, Dr. Kirby, who recommended 1g IV Solu-Medrol, and to admit the patient if this did not improve symptoms.    UA ordered to rule out infectious source of flare.   Continue with 1g Solu-Medrol IV daily  Inpatient neurology consulted, assistance appreciated.  Neurochecks every 4 hours  Hypothyroidism  TSH ordered   Restart home Synthroid pending med rec     F/E/N: Oral hydration.  Continue monitor electrolytes.  Heart healthy diet.    ---------------------------------------------------  DVT Prophylaxis: Ankitu Lovenox  Activity: Up with assistance    The patient is  considered to be a high risk patient due to: MS flare     OBSERVATION status, however if further evaluation or treatment plans warrant, status may change.  Based upon current information, I predict patient's care encounter to be less than or equal to 2 midnights.      Code Status: Full Code     Disposition/Discharge planning: Pending clinical course, likely discharge home with in 24-48 hrs.     I have discussed the patient's assessment and plan with Dr. Gieslle Coulter PA-C  Hospitalist Service -- [facility]       01/09/24  21:33 EST    Attending Physician: Dr. Desai.

## 2024-01-10 NOTE — CONSULTS
Neurology Consult Note    Patient Name: Starr Armenta   MRN: [id number]  Age: 43 y.o.  Sex: female  : 1980    Primary Care Physician: Alexandr Corbin PA-C  Referring Physician:  Provider, No Known    Chief Complaint/Reason for Consultation: Left-sided weakness/MS flare    Subjective .  HPI: 43-year-old female PMHx significant for hypothyroidism and multiple sclerosis presented to ED after a week of progressive left-sided numbness/tingling.  She reports it started in her left hand and moved into her arm down her left side into her left leg.  Reports that typical symptoms from MS is just issues with her left hand.  She was diagnosed with MS 10 years ago and was initially on disease modifying therapy but states it was not helping and she stopped it on her own.  Has not followed up with neurology in quite some time, scheduled to see Dr. Herrera in March.  She does not have any visual changes, no swallowing difficulty no speech difficulty.  Denies any recent illness.  She has received 1 g of IV methylprednisolone x 2 doses, reports symptoms have improved but she still has to concentrate to move her left hand properly.  She was ambulating around the room without difficulty, upper extremity strength was equal.      Review of Systems   Constitutional:  Positive for activity change. Negative for chills and fever.   HENT: Negative.     Eyes: Negative.    Respiratory: Negative.     Cardiovascular: Negative.    Neurological:  Positive for weakness and numbness. Negative for tremors, facial asymmetry and speech difficulty.   Psychiatric/Behavioral: Negative.        Past Medical History:   Diagnosis Date    Disease of thyroid gland     Multiple sclerosis      Past Surgical History:   Procedure Laterality Date    CHOLECYSTECTOMY      ENDOMETRIAL ABLATION       Family History   Problem Relation Age of Onset    Breast cancer Maternal Aunt      Social History     Socioeconomic History    Marital status:     Tobacco Use    Smoking status: Never   Vaping Use    Vaping Use: Never used   Substance and Sexual Activity    Alcohol use: No    Drug use: No    Sexual activity: Yes     Partners: Male     No Known Allergies  Prior to Admission medications    Medication Sig Start Date End Date Taking? Authorizing Provider   levothyroxine (SYNTHROID, LEVOTHROID) 88 MCG tablet Take 1 tablet by mouth Daily.   Yes Therese Rossi MD   vitamin D (ERGOCALCIFEROL) 1.25 MG (45445 UT) capsule capsule Take 1 capsule by mouth 1 (One) Time Per Week.   Yes Therese Rossi MD   predniSONE (DELTASONE) 10 MG tablet Take 4 tablets by mouth Daily for 7 days, THEN 3 tablets Daily for 7 days, THEN 2 tablets Daily for 7 days, THEN 1 tablet Daily for 7 days. 1/11/24 2/8/24  Eze Fortune DO   levothyroxine (SYNTHROID, LEVOTHROID) 100 MCG tablet Take 100 mcg by mouth Daily.  1/10/24  Therese Rossi MD             Objective     Temp:  [97.8 °F (36.6 °C)-98.4 °F (36.9 °C)] 97.8 °F (36.6 °C)  Heart Rate:  [] 106  Resp:  [14-18] 18  BP: (119-163)/(63-94) 133/90  Neurological Exam  Mental Status  Awake and alert. Oriented to person, place, time and situation. Recent and remote memory are intact. Speech is normal. Language is fluent with no aphasia. Attention and concentration are normal. Fund of knowledge is appropriate for level of education.    Cranial Nerves  CN III, IV, VI: Extraocular movements intact bilaterally. Normal lids and orbits bilaterally.  CN VII: Full and symmetric facial movement.  CN XI: Shoulder shrug strength is normal.  CN XII: Tongue midline without atrophy or fasciculations.    Motor  Normal muscle bulk throughout. Normal muscle tone. No abnormal involuntary movements. Strength is 5/5 throughout all four extremities.    Coordination    No obvious dysmetria.    Gait  Casual gait is normal including stance, stride, and arm swing.      Physical Exam  Vitals and nursing note reviewed.   Constitutional:        "General: She is awake. She is not in acute distress.     Appearance: Normal appearance. She is not ill-appearing.   HENT:      Head: Normocephalic.      Mouth/Throat:      Mouth: Mucous membranes are moist.      Pharynx: Oropharynx is clear.   Eyes:      General: Lids are normal.      Extraocular Movements: Extraocular movements intact.   Cardiovascular:      Rate and Rhythm: Tachycardia present.   Pulmonary:      Effort: Pulmonary effort is normal. No respiratory distress.   Musculoskeletal:         General: Normal range of motion.   Skin:     General: Skin is warm and dry.   Neurological:      Mental Status: She is alert.      Motor: Motor strength is normal.  Psychiatric:         Mood and Affect: Mood normal.         Speech: Speech normal.         Behavior: Behavior normal.         Hospital Meds:  Scheduled- [START ON 1/12/2024] cholecalciferol, 50,000 Units, Oral, Weekly  enoxaparin, 40 mg, Subcutaneous, Daily  levothyroxine, 88 mcg, Oral, Q AM  methylPREDNISolone sodium succinate, 1,000 mg, Intravenous, Daily  senna-docusate sodium, 2 tablet, Oral, BID  sodium chloride, 10 mL, Intravenous, Q12H      Infusions-     PRNs-   senna-docusate sodium **AND** polyethylene glycol **AND** bisacodyl **AND** bisacodyl    nitroglycerin    sodium chloride    sodium chloride    sodium chloride    Results Reviewed:  I have personally reviewed current labs, radiology, and data.    No results found for: \"HGBA1C\"  Lab Results   Component Value Date    WBC 7.13 01/10/2024    HGB 14.3 01/10/2024    HCT 41.5 01/10/2024    MCV 84.7 01/10/2024     01/10/2024      Lab Results   Component Value Date    GLUCOSE 277 (H) 01/10/2024    BUN 10 01/10/2024    CREATININE 1.02 (H) 01/10/2024    EGFRIFNONA 63 01/27/2020    BCR 9.8 01/10/2024    K 3.8 01/10/2024    CO2 21.4 (L) 01/10/2024    CALCIUM 9.1 01/10/2024    ALBUMIN 4.3 01/10/2024    AST 36 (H) 01/10/2024    ALT 27 01/10/2024      Lab Results   Component Value Date    CHOL 157 " 01/14/2020     Lab Results   Component Value Date    TRIG 121 01/14/2020     Lab Results   Component Value Date    HDL 38 (L) 01/14/2020     Lab Results   Component Value Date    LDL 95 01/14/2020      Imaging Results (Last 24 Hours)       Procedure Component Value Units Date/Time    MRI Brain With & Without Contrast [[id number]] Collected: 01/09/24 1701     Updated: 01/09/24 1705    Narrative:      EXAM:    MR Head Without and With Intravenous Contrast     EXAM DATE:    1/9/2024 4:34 PM     CLINICAL HISTORY:    MS Flare     TECHNIQUE:    Magnetic resonance images of the head/brain without and with  intravenous contrast in multiple planes.     COMPARISON:    No relevant prior studies available.     FINDINGS:    BRAIN AND EXTRA-AXIAL SPACES:  Unremarkable as visualized.  No  hemorrhage.  No enhancing lesions identified. There are however  periventricular white matter T2 lesions consistent with MS. Not markedly  different since 8/24/2023.    BONES/JOINTS:  See above.    SINUSES:  Unremarkable as visualized.  No acute sinusitis.    MASTOID AIR CELLS:  Unremarkable as visualized.  No mastoid effusion.    ORBITS:  Unremarkable as visualized.       Impression:        No enhancing lesions identified. There are however periventricular  white matter T2 lesions consistent with MS. Not markedly different since  8/24/2023.        This report was finalized on 1/9/2024 5:03 PM by Dr. Gaurav Connor MD.       CT Head Without Contrast [[id number]] Collected: 01/09/24 1219     Updated: 01/09/24 1221    Narrative:      EXAM:    CT Head Without Intravenous Contrast     EXAM DATE:    1/9/2024 11:29 AM     CLINICAL HISTORY:    Transient ischemic attack (TIA)     TECHNIQUE:    Axial computed tomography images of the head/brain without intravenous  contrast.  Sagittal and coronal reformatted images were created and  reviewed.  This CT exam was performed using one or more of the following  dose reduction techniques:  automated exposure  control, adjustment of  the mA and/or kV according to patient size, and/or use of iterative  reconstruction technique.     COMPARISON:    No relevant prior studies available.     FINDINGS:    BRAIN AND EXTRA-AXIAL SPACES:  Unremarkable as visualized.  No  hemorrhage.  No significant white matter disease.  No edema.  No  ventriculomegaly.    BONES/JOINTS:  Unremarkable as visualized.  No acute fracture.    SOFT TISSUES:  Unremarkable as visualized.    SINUSES:  Left maxillary sinus mucous retention cyst.    MASTOID AIR CELLS:  Unremarkable as visualized.  No mastoid effusion.       Impression:        No acute findings in the head/brain.        This report was finalized on 1/9/2024 12:19 PM by Dr. Gaurav Connor MD.       XR Chest 1 View [[id number]] Collected: 01/09/24 1210     Updated: 01/09/24 1213    Narrative:      EXAM:    XR Chest, 1 View     EXAM DATE:    1/9/2024 10:53 AM     CLINICAL HISTORY:    Chest Pain Protocol     TECHNIQUE:    Frontal view of the chest.     COMPARISON:    January 20, 2023     FINDINGS:    LUNGS AND PLEURAL SPACES:  Unremarkable as visualized.  No  consolidation.  No pneumothorax.    HEART:  Unremarkable as visualized.  No cardiomegaly.    MEDIASTINUM:  Unremarkable as visualized.    BONES/JOINTS:  Unremarkable as visualized.       Impression:        Unremarkable exam. No acute cardiopulmonary findings identified.        This report was finalized on 1/9/2024 12:10 PM by Dr. Gaurav Connor MD.                     Assessment/Plan:  43-year-old female with known diagnosis of multiple sclerosis x 10 years not currently on any disease modifying agents presented with a week of progressive left-sided weakness/numbness.  She received 2 doses of high-dose steroids and symptoms have improved although still some issues with her left hand, feel like she has to concentrate to move her left hand properly.    Initial CT head negative for any acute changes.  MRI brain with and without contrast did not  show any areas of acute ischemia, no mass, no hemorrhage.  There were no contrast-enhancing MS lesions but there were periventricular white matter changes consistent with known history of MS.    #Multiple sclerosis flare  #Left-sided weakness/numbness, improving      -Patient okay to discharge home from neurology standpoint  -Recommend a slow steroid taper:  Prednisone 40 mg daily x 1 week, then 20 mg daily x 1 week, then 10 mg daily x 1 week, then 5 mg daily x 1 week, then stop  -We recommended to her to take OTC omeprazole while on the steroids for GI prophylaxis  -She has follow-up scheduled with outpatient neurology    The case was discussed with the patient, and Dr. Fortune.    Thank you for the consultation, please feel free to call with any questions.  Dr. Kirby present for encounter via telemedicine.  Verbal consent taken.  Tele-Neurology will sign off for now.    Abran Gould, APRN  January 10, 2024  11:43 EST    Please note that portions of this note were completed with a voice recognition program. Efforts were made to edit dictation, but occasionally words are mistranscribed.

## 2024-01-10 NOTE — PLAN OF CARE
Goal Outcome Evaluation:   Patient is being discharged home today.

## 2024-01-10 NOTE — DISCHARGE SUMMARY
[facility] HOSPITALISTS DISCHARGE SUMMARY    Patient Identification:  Name:  Starr Armenta  Age:  43 y.o.  Sex:  female  :  1980  MRN:  [id number]  Visit Number:  [id number]    Date of Admission: 2024  Date of Discharge:  1/10/2024     PCP: Alexandr Corbin PA-C    DISCHARGE DIAGNOSIS      CONSULTS       PROCEDURES PERFORMED      HOSPITAL COURSE  Patient is a 43 y.o. female presented to [facility] complaining of ***.  Please see the admitting history and physical for further details.          VITAL SIGNS:  Temp:  [97.8 °F (36.6 °C)-98.4 °F (36.9 °C)] 97.8 °F (36.6 °C)  Heart Rate:  [] 106  Resp:  [14-18] 18  BP: (119-163)/() 133/90  SpO2:  [95 %-100 %] 96 %  on   ;   Device (Oxygen Therapy): room air    Body mass index is 34.31 kg/m².  Wt Readings from Last 3 Encounters:   01/10/24 112 kg (246 lb)   23 116 kg (255 lb)   23 116 kg (255 lb)       PHYSICAL EXAM:  Constitutional:  Well-developed and well-nourished.  No respiratory distress.      HENT:  Head:  Normocephalic and atraumatic.  Mouth:  Moist mucous membranes.    Eyes:  Conjunctivae and EOM are normal.  No scleral icterus.    Neck:  Neck supple.  No JVD present.    Cardiovascular:  Normal rate, regular rhythm and normal heart sounds with no murmur.  Pulmonary/Chest:  No respiratory distress, no wheezes, no crackles, with normal breath sounds and good air movement.  Abdominal:  Soft.  Bowel sounds are normal.  No distension and no tenderness.   Musculoskeletal:  No edema, no tenderness, and no deformity.  No red or swollen joints anywhere.    Neurological:  Alert and oriented to person, place, and time.  No cranial nerve deficit.  No tongue deviation.  No facial droop.  No slurred speech.   Skin:  Skin is warm and dry. No rash noted. No pallor.   Peripheral vascular: Pulses in all 4 extremities with no clubbing, no cyanosis, no edema.    DISCHARGE DISPOSITION   Stable    DISCHARGE  MEDICATIONS:     Discharge Medications        New Medications        Instructions Start Date   predniSONE 10 MG tablet  Commonly known as: DELTASONE   Take 4 tablets by mouth Daily for 7 days, THEN 3 tablets Daily for 7 days, THEN 2 tablets Daily for 7 days, THEN 1 tablet Daily for 7 days.   Start Date: January 11, 2024            Continue These Medications        Instructions Start Date   levothyroxine 88 MCG tablet  Commonly known as: SYNTHROID, LEVOTHROID   88 mcg, Oral, Daily      vitamin D 1.25 MG (71166 UT) capsule capsule  Commonly known as: ERGOCALCIFEROL   50,000 Units, Oral, Weekly                 Additional Instructions for the Follow-ups that You Need to Schedule       Discharge Follow-up with PCP   As directed       Currently Documented PCP:    Alexandr Corbin PA-C    PCP Phone Number:    [phone]     Follow Up Details: 1 week post hospital follow up               Follow-up Information       Alexandr Corbin PA-C .    Specialty: Physician Assistant  Why: 1 week post hospital follow up  Contact information:  [address] [place] [address]  [phone]                              TEST  RESULTS PENDING AT DISCHARGE       CODE STATUS  Code Status and Medical Interventions:   Ordered at: 01/09/24 2000     Code Status (Patient has no pulse and is not breathing):    CPR (Attempt to Resuscitate)     Medical Interventions (Patient has pulse or is breathing):    Full Support       Eze Fortune DO  [facility]  01/10/24  11:17 EST    Please note that this discharge summary required more than 30 minutes to complete.   pallor.   Peripheral vascular: Pulses in all 4 extremities with no clubbing, no cyanosis, no edema.    DISCHARGE DISPOSITION   Stable    DISCHARGE MEDICATIONS:     Discharge Medications        New Medications        Instructions Start Date   predniSONE 10 MG tablet  Commonly known as: DELTASONE   Take 4 tablets by mouth Daily for 7 days, THEN 3 tablets Daily for 7 days, THEN 2 tablets Daily for 7 days, THEN 1 tablet Daily for 7 days.   Start Date: January 11, 2024            Continue These Medications        Instructions Start Date   levothyroxine 88 MCG tablet  Commonly known as: SYNTHROID, LEVOTHROID   88 mcg, Oral, Daily      vitamin D 1.25 MG (71180 UT) capsule capsule  Commonly known as: ERGOCALCIFEROL   50,000 Units, Oral, Weekly                 Additional Instructions for the Follow-ups that You Need to Schedule       Discharge Follow-up with PCP   As directed       Currently Documented PCP:    Alexandr Corbin PA-C    PCP Phone Number:    [phone]     Follow Up Details: 1 week post hospital follow up               Follow-up Information       Alexandr Corbin PA-C .    Specialty: Physician Assistant  Why: 1 week post hospital follow up  Contact information:  [address] [place] [address]  [phone]                              TEST  RESULTS PENDING AT DISCHARGE       CODE STATUS  Code Status and Medical Interventions:   Ordered at: 01/09/24 2000     Code Status (Patient has no pulse and is not breathing):    CPR (Attempt to Resuscitate)     Medical Interventions (Patient has pulse or is breathing):    Full Support       Eze Fortune DO  [facility]  01/10/24  11:17 EST    Please note that this discharge summary required more than 30 minutes to complete.

## 2024-01-10 NOTE — PLAN OF CARE
Goal Outcome Evaluation: Patient has been pleasant since arrival to , currently resting in bed on RA, saturations maintaining above 90%. No acute s/s of distress at this time. VSS. Will continue plan of care.

## 2024-01-13 ENCOUNTER — APPOINTMENT (OUTPATIENT)
Dept: MRI IMAGING | Facility: HOSPITAL | Age: 44
End: 2024-01-13
Payer: COMMERCIAL

## 2024-01-13 ENCOUNTER — APPOINTMENT (OUTPATIENT)
Dept: GENERAL RADIOLOGY | Facility: HOSPITAL | Age: 44
End: 2024-01-13
Payer: COMMERCIAL

## 2024-01-13 ENCOUNTER — APPOINTMENT (OUTPATIENT)
Dept: CT IMAGING | Facility: HOSPITAL | Age: 44
End: 2024-01-13
Payer: COMMERCIAL

## 2024-01-13 ENCOUNTER — HOSPITAL ENCOUNTER (INPATIENT)
Facility: HOSPITAL | Age: 44
LOS: 2 days | Discharge: HOME | End: 2024-01-18
Attending: EMERGENCY MEDICINE | Admitting: FAMILY MEDICINE
Payer: COMMERCIAL

## 2024-01-13 DIAGNOSIS — R20.0 LEFT SIDED NUMBNESS: ICD-10-CM

## 2024-01-13 DIAGNOSIS — R20.0 RIGHT SIDED NUMBNESS: ICD-10-CM

## 2024-01-13 DIAGNOSIS — G35 MULTIPLE SCLEROSIS EXACERBATION: Primary | ICD-10-CM

## 2024-01-13 DIAGNOSIS — R26.9 GAIT DISTURBANCE: ICD-10-CM

## 2024-01-13 LAB
ALBUMIN SERPL-MCNC: 4.2 G/DL (ref 3.5–5.2)
ALBUMIN/GLOB SERPL: 1.2 G/DL
ALP SERPL-CCNC: 68 U/L (ref 39–117)
ALT SERPL W P-5'-P-CCNC: 59 U/L (ref 1–33)
ANION GAP SERPL CALCULATED.3IONS-SCNC: 10 MMOL/L (ref 5–15)
AST SERPL-CCNC: 48 U/L (ref 1–32)
BACTERIA UR QL AUTO: ABNORMAL /HPF
BASOPHILS # BLD AUTO: 0.02 10*3/MM3 (ref 0–0.2)
BASOPHILS NFR BLD AUTO: 0.1 % (ref 0–1.5)
BILIRUB SERPL-MCNC: 0.7 MG/DL (ref 0–1.2)
BILIRUB UR QL STRIP: NEGATIVE
BUN BLDA-MCNC: 23 MG/DL (ref 8–26)
BUN SERPL-MCNC: 18 MG/DL (ref 6–20)
BUN/CREAT SERPL: 25.7 (ref 7–25)
CA-I BLDA-SCNC: 1.16 MMOL/L (ref 1.2–1.32)
CALCIUM SPEC-SCNC: 8.4 MG/DL (ref 8.6–10.5)
CHLORIDE BLDA-SCNC: 105 MMOL/L (ref 98–109)
CHLORIDE SERPL-SCNC: 104 MMOL/L (ref 98–107)
CLARITY UR: CLEAR
CO2 BLDA-SCNC: 21 MMOL/L (ref 24–29)
CO2 SERPL-SCNC: 27 MMOL/L (ref 22–29)
COLOR UR: YELLOW
CREAT BLDA-MCNC: 1 MG/DL (ref 0.6–1.3)
CREAT SERPL-MCNC: 0.7 MG/DL (ref 0.57–1)
CRP SERPL-MCNC: <0.3 MG/DL (ref 0–0.5)
DEPRECATED RDW RBC AUTO: 40.2 FL (ref 37–54)
EGFRCR SERPLBLD CKD-EPI 2021: 108.8 ML/MIN/1.73
EGFRCR SERPLBLD CKD-EPI 2021: 70.9 ML/MIN/1.73
EOSINOPHIL # BLD AUTO: 0.01 10*3/MM3 (ref 0–0.4)
EOSINOPHIL NFR BLD AUTO: 0.1 % (ref 0.3–6.2)
ERYTHROCYTE [DISTWIDTH] IN BLOOD BY AUTOMATED COUNT: 12.5 % (ref 12.3–15.4)
FOLATE SERPL-MCNC: 12.1 NG/ML (ref 4.78–24.2)
GLOBULIN UR ELPH-MCNC: 3.4 GM/DL
GLUCOSE BLDC GLUCOMTR-MCNC: 120 MG/DL (ref 70–130)
GLUCOSE SERPL-MCNC: 112 MG/DL (ref 65–99)
GLUCOSE UR STRIP-MCNC: NEGATIVE MG/DL
HCG INTACT+B SERPL-ACNC: <0.1 MIU/ML
HCT VFR BLD AUTO: 41.6 % (ref 34–46.6)
HCT VFR BLDA CALC: 33 % (ref 38–51)
HGB BLD-MCNC: 13.9 G/DL (ref 12–15.9)
HGB BLDA-MCNC: 11.2 G/DL (ref 12–17)
HGB UR QL STRIP.AUTO: NEGATIVE
HOLD SPECIMEN: NORMAL
HYALINE CASTS UR QL AUTO: ABNORMAL /LPF
IMM GRANULOCYTES # BLD AUTO: 0.08 10*3/MM3 (ref 0–0.05)
IMM GRANULOCYTES NFR BLD AUTO: 0.6 % (ref 0–0.5)
KETONES UR QL STRIP: NEGATIVE
LEUKOCYTE ESTERASE UR QL STRIP.AUTO: NEGATIVE
LYMPHOCYTES # BLD AUTO: 3.79 10*3/MM3 (ref 0.7–3.1)
LYMPHOCYTES NFR BLD AUTO: 26.2 % (ref 19.6–45.3)
MCH RBC QN AUTO: 29.3 PG (ref 26.6–33)
MCHC RBC AUTO-ENTMCNC: 33.4 G/DL (ref 31.5–35.7)
MCV RBC AUTO: 87.6 FL (ref 79–97)
MONOCYTES # BLD AUTO: 0.94 10*3/MM3 (ref 0.1–0.9)
MONOCYTES NFR BLD AUTO: 6.5 % (ref 5–12)
NEUTROPHILS NFR BLD AUTO: 66.5 % (ref 42.7–76)
NEUTROPHILS NFR BLD AUTO: 9.63 10*3/MM3 (ref 1.7–7)
NITRITE UR QL STRIP: NEGATIVE
NRBC BLD AUTO-RTO: 0 /100 WBC (ref 0–0.2)
PH UR STRIP.AUTO: 6.5 [PH] (ref 5–8)
PLATELET # BLD AUTO: 321 10*3/MM3 (ref 140–450)
PMV BLD AUTO: 9.5 FL (ref 6–12)
POTASSIUM BLDA-SCNC: 4.5 MMOL/L (ref 3.5–4.9)
POTASSIUM SERPL-SCNC: 3.7 MMOL/L (ref 3.5–5.2)
PROT SERPL-MCNC: 7.6 G/DL (ref 6–8.5)
PROT UR QL STRIP: ABNORMAL
QT INTERVAL: 408 MS
QTC INTERVAL: 443 MS
RBC # BLD AUTO: 4.75 10*6/MM3 (ref 3.77–5.28)
RBC # UR STRIP: ABNORMAL /HPF
REF LAB TEST METHOD: ABNORMAL
SODIUM BLD-SCNC: 137 MMOL/L (ref 138–146)
SODIUM SERPL-SCNC: 141 MMOL/L (ref 136–145)
SP GR UR STRIP: 1.03 (ref 1–1.03)
SQUAMOUS #/AREA URNS HPF: ABNORMAL /HPF
TROPONIN T SERPL HS-MCNC: 6 NG/L
TROPONIN T SERPL HS-MCNC: 8 NG/L
UROBILINOGEN UR QL STRIP: ABNORMAL
VIT B12 BLD-MCNC: 440 PG/ML (ref 211–946)
WBC # UR STRIP: ABNORMAL /HPF
WBC NRBC COR # BLD AUTO: 14.47 10*3/MM3 (ref 3.4–10.8)
WHOLE BLOOD HOLD COAG: NORMAL
WHOLE BLOOD HOLD SPECIMEN: NORMAL

## 2024-01-13 PROCEDURE — 80053 COMPREHEN METABOLIC PANEL: CPT | Performed by: EMERGENCY MEDICINE

## 2024-01-13 PROCEDURE — 72156 MRI NECK SPINE W/O & W/DYE: CPT

## 2024-01-13 PROCEDURE — 36415 COLL VENOUS BLD VENIPUNCTURE: CPT

## 2024-01-13 PROCEDURE — 0 GADOBENATE DIMEGLUMINE 529 MG/ML SOLUTION: Performed by: INTERNAL MEDICINE

## 2024-01-13 PROCEDURE — 99223 1ST HOSP IP/OBS HIGH 75: CPT | Performed by: INTERNAL MEDICINE

## 2024-01-13 PROCEDURE — 80047 BASIC METABLC PNL IONIZED CA: CPT

## 2024-01-13 PROCEDURE — 82746 ASSAY OF FOLIC ACID SERUM: CPT | Performed by: PSYCHIATRY & NEUROLOGY

## 2024-01-13 PROCEDURE — 85014 HEMATOCRIT: CPT

## 2024-01-13 PROCEDURE — G0378 HOSPITAL OBSERVATION PER HR: HCPCS

## 2024-01-13 PROCEDURE — 25810000003 SODIUM CHLORIDE 0.9 % SOLUTION: Performed by: EMERGENCY MEDICINE

## 2024-01-13 PROCEDURE — 84484 ASSAY OF TROPONIN QUANT: CPT | Performed by: EMERGENCY MEDICINE

## 2024-01-13 PROCEDURE — 86140 C-REACTIVE PROTEIN: CPT | Performed by: PSYCHIATRY & NEUROLOGY

## 2024-01-13 PROCEDURE — 72157 MRI CHEST SPINE W/O & W/DYE: CPT

## 2024-01-13 PROCEDURE — A9577 INJ MULTIHANCE: HCPCS | Performed by: INTERNAL MEDICINE

## 2024-01-13 PROCEDURE — 81001 URINALYSIS AUTO W/SCOPE: CPT | Performed by: INTERNAL MEDICINE

## 2024-01-13 PROCEDURE — 84702 CHORIONIC GONADOTROPIN TEST: CPT | Performed by: EMERGENCY MEDICINE

## 2024-01-13 PROCEDURE — 71045 X-RAY EXAM CHEST 1 VIEW: CPT

## 2024-01-13 PROCEDURE — 85025 COMPLETE CBC W/AUTO DIFF WBC: CPT | Performed by: EMERGENCY MEDICINE

## 2024-01-13 PROCEDURE — 70450 CT HEAD/BRAIN W/O DYE: CPT

## 2024-01-13 PROCEDURE — 99223 1ST HOSP IP/OBS HIGH 75: CPT | Performed by: PSYCHIATRY & NEUROLOGY

## 2024-01-13 PROCEDURE — 82607 VITAMIN B-12: CPT | Performed by: PSYCHIATRY & NEUROLOGY

## 2024-01-13 PROCEDURE — 25010000002 DIAZEPAM PER 5 MG: Performed by: INTERNAL MEDICINE

## 2024-01-13 PROCEDURE — 93005 ELECTROCARDIOGRAM TRACING: CPT | Performed by: EMERGENCY MEDICINE

## 2024-01-13 PROCEDURE — 25010000002 METHYLPREDNISOLONE PER 125 MG: Performed by: EMERGENCY MEDICINE

## 2024-01-13 PROCEDURE — 99285 EMERGENCY DEPT VISIT HI MDM: CPT

## 2024-01-13 RX ORDER — DIAZEPAM 5 MG/ML
5 INJECTION, SOLUTION INTRAMUSCULAR; INTRAVENOUS ONCE AS NEEDED
Status: COMPLETED | OUTPATIENT
Start: 2024-01-13 | End: 2024-01-13

## 2024-01-13 RX ORDER — BISACODYL 5 MG/1
5 TABLET, DELAYED RELEASE ORAL DAILY PRN
Status: DISCONTINUED | OUTPATIENT
Start: 2024-01-13 | End: 2024-01-18 | Stop reason: HOSPADM

## 2024-01-13 RX ORDER — PANTOPRAZOLE SODIUM 40 MG/1
40 TABLET, DELAYED RELEASE ORAL
Status: DISCONTINUED | OUTPATIENT
Start: 2024-01-13 | End: 2024-01-18 | Stop reason: HOSPADM

## 2024-01-13 RX ORDER — SODIUM CHLORIDE 9 MG/ML
40 INJECTION, SOLUTION INTRAVENOUS AS NEEDED
Status: DISCONTINUED | OUTPATIENT
Start: 2024-01-13 | End: 2024-01-18 | Stop reason: HOSPADM

## 2024-01-13 RX ORDER — BISACODYL 10 MG
10 SUPPOSITORY, RECTAL RECTAL DAILY PRN
Status: DISCONTINUED | OUTPATIENT
Start: 2024-01-13 | End: 2024-01-18 | Stop reason: HOSPADM

## 2024-01-13 RX ORDER — LEVOTHYROXINE SODIUM 88 UG/1
88 TABLET ORAL
Status: DISCONTINUED | OUTPATIENT
Start: 2024-01-13 | End: 2024-01-18 | Stop reason: HOSPADM

## 2024-01-13 RX ORDER — SODIUM CHLORIDE 0.9 % (FLUSH) 0.9 %
10 SYRINGE (ML) INJECTION EVERY 12 HOURS SCHEDULED
Status: DISCONTINUED | OUTPATIENT
Start: 2024-01-13 | End: 2024-01-18 | Stop reason: HOSPADM

## 2024-01-13 RX ORDER — METHYLPREDNISOLONE SODIUM SUCCINATE 125 MG/2ML
1000 INJECTION, POWDER, LYOPHILIZED, FOR SOLUTION INTRAMUSCULAR; INTRAVENOUS ONCE
Status: DISCONTINUED | OUTPATIENT
Start: 2024-01-13 | End: 2024-01-13

## 2024-01-13 RX ORDER — SODIUM CHLORIDE 0.9 % (FLUSH) 0.9 %
10 SYRINGE (ML) INJECTION AS NEEDED
Status: DISCONTINUED | OUTPATIENT
Start: 2024-01-13 | End: 2024-01-18 | Stop reason: HOSPADM

## 2024-01-13 RX ORDER — ACETAMINOPHEN 325 MG/1
650 TABLET ORAL EVERY 4 HOURS PRN
Status: DISCONTINUED | OUTPATIENT
Start: 2024-01-13 | End: 2024-01-18 | Stop reason: HOSPADM

## 2024-01-13 RX ORDER — POLYETHYLENE GLYCOL 3350 17 G/17G
17 POWDER, FOR SOLUTION ORAL DAILY PRN
Status: DISCONTINUED | OUTPATIENT
Start: 2024-01-13 | End: 2024-01-18 | Stop reason: HOSPADM

## 2024-01-13 RX ORDER — AMOXICILLIN 250 MG
2 CAPSULE ORAL 2 TIMES DAILY
Status: DISCONTINUED | OUTPATIENT
Start: 2024-01-13 | End: 2024-01-18 | Stop reason: HOSPADM

## 2024-01-13 RX ORDER — SODIUM CHLORIDE 9 MG/ML
150 INJECTION, SOLUTION INTRAVENOUS ONCE
Status: COMPLETED | OUTPATIENT
Start: 2024-01-13 | End: 2024-01-13

## 2024-01-13 RX ADMIN — LEVOTHYROXINE SODIUM 88 MCG: 0.09 TABLET ORAL at 09:16

## 2024-01-13 RX ADMIN — Medication 10 ML: at 20:34

## 2024-01-13 RX ADMIN — SODIUM CHLORIDE 1 G: 900 INJECTION INTRAVENOUS at 06:23

## 2024-01-13 RX ADMIN — Medication 10 ML: at 13:16

## 2024-01-13 RX ADMIN — PANTOPRAZOLE SODIUM 40 MG: 40 TABLET, DELAYED RELEASE ORAL at 09:16

## 2024-01-13 RX ADMIN — DIAZEPAM 5 MG: 5 INJECTION, SOLUTION INTRAMUSCULAR; INTRAVENOUS at 22:02

## 2024-01-13 RX ADMIN — SENNOSIDES AND DOCUSATE SODIUM 2 TABLET: 8.6; 5 TABLET ORAL at 20:33

## 2024-01-13 RX ADMIN — SENNOSIDES AND DOCUSATE SODIUM 2 TABLET: 8.6; 5 TABLET ORAL at 09:16

## 2024-01-13 RX ADMIN — SODIUM CHLORIDE 150 ML/HR: 9 INJECTION, SOLUTION INTRAVENOUS at 03:30

## 2024-01-13 RX ADMIN — GADOBENATE DIMEGLUMINE 20 ML: 529 INJECTION, SOLUTION INTRAVENOUS at 23:14

## 2024-01-13 NOTE — H&P
[facility]  HISTORY AND PHYSICAL    Patient Name: Starr Armenta  : 1980  MRN: [id number]  Primary Care Physician: Alexandr Corbin PA-C  Date of admission: 2024      Subjective   Subjective     Chief Complaint:  Paresthesias     HPI:  Starr Armenta is a 43 y.o. female w MS diagnosed 20 yrs ago comes in with numbness and difficulty walking.  She reports being asymptomatic x years and has no neurologist.  Approx a week ago she developed symptoms of numbness mostly on the L side, , was admitted Jose [facility] 9-10 at [facility] for high-dose steroids, had a brain MRI, was discharged on prednisone 40mg po daily.  She did not think she got relief from steroids.  She now has worsening RUE RLE numbness, ongoing L hand symtoms, and trouble walking.  Due to being unable to get into a neurology clinic for months, she came to [facility] ED.       She feels anxious because of a sensation of torso being squeezed such that breathing is difficult - however her oxygen sat is normal.   Denies chest presssure       Personal History     Past Medical History:   Diagnosis Date    Disease of thyroid gland     Multiple sclerosis            Past Surgical History:   Procedure Laterality Date    CHOLECYSTECTOMY      ENDOMETRIAL ABLATION         Family History: family history includes Breast cancer in her maternal aunt.     Social History:  reports that she has never smoked. She does not have any smokeless tobacco history on file. She reports that she does not drink alcohol and does not use drugs.  Social History     Social History Narrative    Not on file       Medications:  Available home medication information reviewed.  levothyroxine, omeprazole, predniSONE, and vitamin D    No Known Allergies    Objective   Objective     Vital Signs:   Temp:  [98 °F (36.7 °C)] 98 °F (36.7 °C)  Heart Rate:  [60-79] 60  Resp:  [20] 20  BP: (114-148)/(64-86) 143/85       Physical Exam   Gen:  WD/WN woman in bed,   present   Neuro: alert and oriented, clear speech, follows commands, strength is near-normal , sensation impaired though not absent (feels pressure and temp) and worse in LLE LUE. Torso has a sensory level around T5.  HEENT:  NC/AT   Neck:  Supple, no LAD  Heart RRR no murmur, rub, or gallop  Abd:  Soft, nontender, no rebound or guarding, pos BS  Extrem:  No c/c/e      Result Review:  I have personally reviewed the results from the time of this admission to 1/13/2024 08:07 EST and agree with these findings:  [x]  Laboratory list / accordion  []  Microbiology  [x]  Radiology  []  EKG/Telemetry   []  Cardiology/Vascular   []  Pathology  [x]  Old records  []  Other:  Most notable findings include: brain MRI noted.  WBC 14.  Creat nl.       LAB RESULTS:      Lab 01/13/24  0254 01/10/24  0130 01/09/24  1102   WBC 14.47* 7.13 7.38   HEMOGLOBIN 13.9 14.3 14.0   HEMATOCRIT 41.6 41.5 41.9   PLATELETS 321 313 283   NEUTROS ABS 9.63* 6.22 4.42   IMMATURE GRANS (ABS) 0.08* 0.04 0.03   LYMPHS ABS 3.79* 0.83 2.17   MONOS ABS 0.94* 0.02* 0.51   EOS ABS 0.01 0.00 0.15   MCV 87.6 84.7 86.2         Lab 01/13/24  0254 01/10/24  0130 01/09/24  1305 01/09/24  1102   SODIUM 141 139  --  139   POTASSIUM 3.7 3.8  --  4.0   CHLORIDE 104 103  --  103   CO2 27.0 21.4*  --  25.0   ANION GAP 10.0 14.6  --  11.0   BUN 18 10  --  9   CREATININE 0.70 1.02*  --  0.93   EGFR 108.8 69.3  --  77.4   GLUCOSE 112* 277*  --  95   CALCIUM 8.4* 9.1  --  9.5   TSH  --   --  5.780*  --          Lab 01/13/24  0254 01/10/24  0130 01/09/24  1102   TOTAL PROTEIN 7.6 7.8 7.7   ALBUMIN 4.2 4.3 4.4   GLOBULIN 3.4 3.5 3.3   ALT (SGPT) 59* 27 20   AST (SGOT) 48* 36* 33*   BILIRUBIN 0.7 0.5 0.7   ALK PHOS 68 92 81         Lab 01/13/24  0639 01/13/24  0254 01/09/24  1305 01/09/24  1102   HSTROP T 8 6 <6 <6                 UA          1/9/2024    20:47 1/13/2024    07:02   Urinalysis   Squamous Epithelial Cells, UA 3-6  0-2    Specific Gravity, UA 1.029  1.029     Ketones, UA Negative  Negative    Blood, UA Moderate (2+)  Negative    Leukocytes, UA Negative  Negative    Nitrite, UA Negative  Negative    RBC, UA 11-20  11-20    WBC, UA 0-2  3-5    Bacteria, UA None Seen  Trace        Microbiology Results (last 10 days)       ** No results found for the last 240 hours. **            XR Chest 1 View    Result Date: 1/13/2024  XR CHEST 1 VW Date of Exam: 1/13/2024 2:45 AM EST Indication: Acute Stroke Protocol (onset < 12 hrs) Comparison: Chest radiograph January 9, 2024 Findings: There are no airspace consolidations. No pleural fluid. No pneumothorax. The pulmonary vasculature appears within normal limits. The cardiac and mediastinal silhouette appear unremarkable. No acute osseous abnormality identified.     Impression: Impression: No active disease Electronically Signed: Abran Hope MD  1/13/2024 3:08 AM EST  Workstation ID: [id number]    CT Head Without Contrast    Result Date: 1/13/2024  CT HEAD WO CONTRAST Date of Exam: 1/13/2024 2:36 AM EST Indication: Transient ischemic attack (TIA) MS flare, numbness first to left side 8 days ago, then to right side for 2 days. Comparison: MRI of the brain dated January 9, 2024; CT scan head dated January 9, 2024 Technique: Axial CT images were obtained of the head without contrast administration.  Automated exposure control and iterative construction methods were used. Findings: There is no evidence of hemorrhage. There is no mass effect or midline shift. There is no extracerebral collection. Ventricles are normal in size and configuration for patient's stated age.  Posterior fossa is within normal limits. Calvarium and skull base appear intact.   Visualized sinuses show no air fluid levels. Visualized orbits are unremarkable.     Impression: Impression: No acute intracranial abnormality Electronically Signed: Abran Hope MD  1/13/2024 2:56 AM EST  Workstation ID: [id number]         Assessment & Plan   Assessment & Plan       Multiple  sclerosis exacerbation      43 yr old with MS diagnosed in [place] 20 yrs ago, now with flare which is persisting / worsening despite high-dose steroids given Jan 9-10 at [facility].  Returns to ED due to worsening symptoms    MS presumed flare  - Neurology consult  - consider C and T  cord MRI, LP.    - will defer steroid choices to neurologist  - needs to establish care with outpt clinic    Hypothyroid, fatty liver     DVT prophylaxis:  No DVT prophylaxis order currently exists.      CODE STATUS:    There are no questions and answers to display.       Expected Discharge   Expected discharge date/ time has not been documented.     Gail Lares MD  01/13/24

## 2024-01-13 NOTE — Clinical Note
Level of Care: Telemetry [5]   Diagnosis: Multiple sclerosis exacerbation [793109]   Admitting Physician: MONET COFFEY III [[id number]]   Attending Physician: MONET COFFEY III [[id number]]   Bed Request Comments: tele obs (not CDU)

## 2024-01-13 NOTE — CONSULTS
Neurology Note    Patient:  Starr Armenta    YOB: 1980    REFERRING PHYSICIAN:  Dr. Lares    CHIEF COMPLAINT:    numbness    HISTORY OF PRESENT ILLNESS:   42 y/o female diagnosed with MS about 20 years ago in [place] when she presented with bilateral numbness, was toled that she had lesions on her spinal cord, treated with steroids and Copaxone for 10 years w/o another major attack. She was lost to follow up and went off Copaxone whe she moved to [place] in 2014. Around January 4 she developed left sided numbness below neck level and difficulty with gait, was admitted at [facility] Jan9-10, reports getting two doses of IV Medrol that did not help. MRI brain w/wo without acute changes. Presented here last night with possibly worsening symptoms, received Medrol 1 g IV. Reports being able to eat, get up to the BR today. Denies B/B incontinence.    Past Medical History:  Past Medical History:   Diagnosis Date    Disease of thyroid gland     Multiple sclerosis        Past Surgical History:  Past Surgical History:   Procedure Laterality Date    CHOLECYSTECTOMY      ENDOMETRIAL ABLATION         Social History:   Social History     Socioeconomic History    Marital status:    Tobacco Use    Smoking status: Never   Vaping Use    Vaping Use: Never used   Substance and Sexual Activity    Alcohol use: No    Drug use: No    Sexual activity: Yes     Partners: Male        Family History:   Family History   Problem Relation Age of Onset    Breast cancer Maternal Aunt        Medications Prior to Admission:    Prior to Admission medications    Medication Sig Start Date End Date Taking? Authorizing Provider   levothyroxine (SYNTHROID, LEVOTHROID) 88 MCG tablet Take 1 tablet by mouth Daily.   Yes Provider, MD Therese   omeprazole (priLOSEC) 20 MG capsule Take 1 capsule by mouth Daily. 1/10/24  Yes Eze Fortune,    predniSONE (DELTASONE) 10 MG tablet Take 4 tablets by mouth Daily for 7 days, THEN 3 tablets Daily  for 7 days, THEN 2 tablets Daily for 7 days, THEN 1 tablet Daily for 7 days. 1/11/24 2/8/24 Yes Eze Fortune,    vitamin D (ERGOCALCIFEROL) 1.25 MG (11558 UT) capsule capsule Take 1 capsule by mouth 1 (One) Time Per Week.   Yes Provider, MD Therese       Allergies:  Patient has no known allergies.      Review of system  Review of Systems   Musculoskeletal:  Positive for gait problem.   Neurological:  Positive for numbness.   All other systems reviewed and are negative.      Vitals:    01/13/24 1121   BP: 137/81   Pulse: 85   Resp: 15   Temp: 98.6 °F (37 °C)   SpO2: 96%       Physical exam  Physical Exam  Eyes:      Extraocular Movements: Extraocular movements intact.      Pupils: Pupils are equal, round, and reactive to light.   Cardiovascular:      Rate and Rhythm: Normal rate and regular rhythm.   Pulmonary:      Effort: Pulmonary effort is normal.   Neurological:      Mental Status: She is oriented to person, place, and time.      Deep Tendon Reflexes: Babinski sign absent on the right side. Babinski sign absent on the left side.      Comments: Speech clear, VFF, no facial droop, no limb drift, clumsy FTN on the left, reports decreased sensation to touch left arm and leg, sensory level on the left around T4           Lab Results   Component Value Date    WBC 14.47 (H) 01/13/2024    HGB 11.2 (L) 01/13/2024    HCT 33 (L) 01/13/2024    MCV 87.6 01/13/2024     01/13/2024     Lab Results   Component Value Date    GLUCOSE 112 (H) 01/13/2024    BUN 18 01/13/2024    CREATININE 1.00 01/13/2024    EGFRIFNONA 63 01/27/2020    BCR 25.7 (H) 01/13/2024    CO2 27.0 01/13/2024    CALCIUM 8.4 (L) 01/13/2024    ALBUMIN 4.2 01/13/2024    AST 48 (H) 01/13/2024    ALT 59 (H) 01/13/2024         Radiological Studies:   XR Chest 1 View    Result Date: 1/13/2024  XR CHEST 1 VW Date of Exam: 1/13/2024 2:45 AM EST Indication: Acute Stroke Protocol (onset < 12 hrs) Comparison: Chest radiograph January 9, 2024 Findings: There  are no airspace consolidations. No pleural fluid. No pneumothorax. The pulmonary vasculature appears within normal limits. The cardiac and mediastinal silhouette appear unremarkable. No acute osseous abnormality identified.     Impression: No active disease Electronically Signed: Abran Hope MD  1/13/2024 3:08 AM EST  Workstation ID: [id number]    CT Head Without Contrast    Result Date: 1/13/2024  CT HEAD WO CONTRAST Date of Exam: 1/13/2024 2:36 AM EST Indication: Transient ischemic attack (TIA) MS flare, numbness first to left side 8 days ago, then to right side for 2 days. Comparison: MRI of the brain dated January 9, 2024; CT scan head dated January 9, 2024 Technique: Axial CT images were obtained of the head without contrast administration.  Automated exposure control and iterative construction methods were used. Findings: There is no evidence of hemorrhage. There is no mass effect or midline shift. There is no extracerebral collection. Ventricles are normal in size and configuration for patient's stated age.  Posterior fossa is within normal limits. Calvarium and skull base appear intact.   Visualized sinuses show no air fluid levels. Visualized orbits are unremarkable.     Impression: No acute intracranial abnormality Electronically Signed: Abran Hope MD  1/13/2024 2:56 AM EST  Workstation ID: [id number]    MRI Brain With & Without Contrast    Result Date: 1/9/2024  EXAM:   MR Head Without and With Intravenous Contrast  EXAM DATE:   1/9/2024 4:34 PM  CLINICAL HISTORY:   MS Flare  TECHNIQUE:   Magnetic resonance images of the head/brain without and with intravenous contrast in multiple planes.  COMPARISON:   No relevant prior studies available.  FINDINGS:   BRAIN AND EXTRA-AXIAL SPACES:  Unremarkable as visualized.  No hemorrhage.  No enhancing lesions identified. There are however periventricular white matter T2 lesions consistent with MS. Not markedly different since 8/24/2023.   BONES/JOINTS:  See above.    supervision SINUSES:  Unremarkable as visualized.  No acute sinusitis.   MASTOID AIR CELLS:  Unremarkable as visualized.  No mastoid effusion.   ORBITS:  Unremarkable as visualized.        No enhancing lesions identified. There are however periventricular white matter T2 lesions consistent with MS. Not markedly different since 8/24/2023.   This report was finalized on 1/9/2024 5:03 PM by Dr. Gaurav Connor MD.      CT Head Without Contrast    Result Date: 1/9/2024  EXAM:   CT Head Without Intravenous Contrast  EXAM DATE:   1/9/2024 11:29 AM  CLINICAL HISTORY:   Transient ischemic attack (TIA)  TECHNIQUE:   Axial computed tomography images of the head/brain without intravenous contrast.  Sagittal and coronal reformatted images were created and reviewed.  This CT exam was performed using one or more of the following dose reduction techniques:  automated exposure control, adjustment of the mA and/or kV according to patient size, and/or use of iterative reconstruction technique.  COMPARISON:   No relevant prior studies available.  FINDINGS:   BRAIN AND EXTRA-AXIAL SPACES:  Unremarkable as visualized.  No hemorrhage.  No significant white matter disease.  No edema.  No ventriculomegaly.   BONES/JOINTS:  Unremarkable as visualized.  No acute fracture.   SOFT TISSUES:  Unremarkable as visualized.   SINUSES:  Left maxillary sinus mucous retention cyst.   MASTOID AIR CELLS:  Unremarkable as visualized.  No mastoid effusion.        No acute findings in the head/brain.   This report was finalized on 1/9/2024 12:19 PM by Dr. Gaurav Connor MD.      XR Chest 1 View    Result Date: 1/9/2024  EXAM:   XR Chest, 1 View  EXAM DATE:   1/9/2024 10:53 AM  CLINICAL HISTORY:   Chest Pain Protocol  TECHNIQUE:   Frontal view of the chest.  COMPARISON:   January 20, 2023  FINDINGS:   LUNGS AND PLEURAL SPACES:  Unremarkable as visualized.  No consolidation.  No pneumothorax.   HEART:  Unremarkable as visualized.  No cardiomegaly.   MEDIASTINUM:   Unremarkable as visualized.   BONES/JOINTS:  Unremarkable as visualized.        Unremarkable exam. No acute cardiopulmonary findings identified.   This report was finalized on 1/9/2024 12:10 PM by Dr. Gaurav Connor MD.         During this visit the following were done:  Labs Reviewed [x]    Labs Ordered [x]    Radiology Reports Reviewed [x]    Radiology Ordered [x]    EKG, echo, and/or stress test reviewed [x]    EEG results reviewed  []    EEG reviewed and interpreted per myself   []    Discussed case with neurointerventionalist or neuroradiologist []    Referring Provider Records Reviewed []    ER Records Reviewed []    Hospital Records Reviewed []    History Obtained From Family []    Radiological images view and Interpreted per myself [x]    Case Discussed with referring provider [x]     Decision to obtain and request outside records  []        Assessment and Plan     Left-sided numbness and ataxia, presumed MS attack given history. Recent MRI brain w/wo shows chronic changes only, personally reviewed.   - Observation on telemetry.   - Fall precautions.   - Medrol 1g IV x 3.   - MRI c and t spine w/wo.   - Labs.   - Consider LP.   - ST, PT, OT.                 Electronically signed by Kevin Colón MD on 1/13/2024 at 12:36 EST

## 2024-01-13 NOTE — ED NOTES
.. Starr Armenta    Nursing Report ED to Floor:  Mental status: A/O time 4  Ambulatory status: Up ad monserrat  Oxygen Therapy:  RA  Cardiac Rhythm: NSR  Admitted from: ED  Safety Concerns:  none at  this time  Social Issues: needs a neurologist for MS  ED Room #:  16    ED Nurse Phone Extension - [phone] or may call [phone].      HPI:   Chief Complaint   Patient presents with    Numbness       Past Medical History:  Past Medical History:   Diagnosis Date    Disease of thyroid gland     Multiple sclerosis         Past Surgical History:  Past Surgical History:   Procedure Laterality Date    CHOLECYSTECTOMY      ENDOMETRIAL ABLATION          Admitting Doctor:   Michi Aguilar III, DO    Consulting Provider(s):  Consults       Date and Time Order Name Status Description    1/9/2024  9:56 PM Inpatient Neurology Consult Other (see comments) Completed              Admitting Diagnosis:   The primary encounter diagnosis was Multiple sclerosis exacerbation. Diagnoses of Left sided numbness, Right sided numbness, and Gait disturbance were also pertinent to this visit.    Most Recent Vitals:   Vitals:    01/13/24 0600 01/13/24 0615 01/13/24 0630 01/13/24 0645   BP: 114/64 115/68 117/69 122/71   Pulse: 66 67 64 68   Resp:       Temp:       SpO2: 95% 96% 95% 97%   Weight:       Height:           Active LDAs/IV Access:   Lines, Drains & Airways       Active LDAs       Name Placement date Placement time Site Days    Peripheral IV 01/13/24 0245 Right Antecubital 01/13/24 0245  Antecubital  less than 1                    Labs (abnormal labs have a star):   Labs Reviewed   COMPREHENSIVE METABOLIC PANEL - Abnormal; Notable for the following components:       Result Value    Glucose 112 (*)     Calcium 8.4 (*)     ALT (SGPT) 59 (*)     AST (SGOT) 48 (*)     BUN/Creatinine Ratio 25.7 (*)     All other components within normal limits    Narrative:     GFR Normal >60  Chronic Kidney Disease <60  Kidney Failure <15     CBC WITH AUTO  DIFFERENTIAL - Abnormal; Notable for the following components:    WBC 14.47 (*)     Eosinophil % 0.1 (*)     Immature Grans % 0.6 (*)     Neutrophils, Absolute 9.63 (*)     Lymphocytes, Absolute 3.79 (*)     Monocytes, Absolute 0.94 (*)     Immature Grans, Absolute 0.08 (*)     All other components within normal limits   SINGLE HSTROPONIN T - Normal    Narrative:     High Sensitive Troponin T Reference Range:  <14.0 ng/L- Negative Female for AMI  <22.0 ng/L- Negative Male for AMI  >=14 - Abnormal Female indicating possible myocardial injury.  >=22 - Abnormal Male indicating possible myocardial injury.   Clinicians would have to utilize clinical acumen, EKG, Troponin, and serial changes to determine if it is an Acute Myocardial Infarction or myocardial injury due to an underlying chronic condition.        SINGLE HSTROPONIN T - Normal    Narrative:     High Sensitive Troponin T Reference Range:  <14.0 ng/L- Negative Female for AMI  <22.0 ng/L- Negative Male for AMI  >=14 - Abnormal Female indicating possible myocardial injury.  >=22 - Abnormal Male indicating possible myocardial injury.   Clinicians would have to utilize clinical acumen, EKG, Troponin, and serial changes to determine if it is an Acute Myocardial Infarction or myocardial injury due to an underlying chronic condition.        RAINBOW DRAW    Narrative:     The following orders were created for panel order Priddy Draw.  Procedure                               Abnormality         Status                     ---------                               -----------         ------                     Green Top (Gel)[[id number]]                                  Final result               Lavender Top[[id number]]                                     Final result               Gold Top - SST[[id number]]                                   Final result               Joel Top[[id number]]                                         Final result               Light Blue  Top[[id number]]                                   Final result                 Please view results for these tests on the individual orders.   HCG, QUANTITATIVE, PREGNANCY    Narrative:     HCG Ranges by Gestational Age    Females - non-pregnant premenopausal   </= 1mIU/mL HCG  Females - postmenopausal               </= 7mIU/mL HCG    3 Weeks         5.8 -    71.2 mIU/mL  4 Weeks         9.5 -     750 mIU/mL  5 Weeks         217 -   7,138 mIU/mL  6 Weeks         158 -  31,795 mIU/mL  7 Weeks       3,697 - 163,563 mIU/mL  8 Weeks      32,065 - 149,571 mIU/mL  9 Weeks      63,803 - 151,410 mIU/mL  10 Weeks     46,509 - 186,977 mIU/mL  12 Weeks     27,832 - 210,612 mIU/mL  14 Weeks     13,950 -  62,530 mIU/mL  15 Weeks     12,039 -  70,971 mIU/mL  16 Weeks      9,040 -  56,451 mIU/mL  17 Weeks      8,175 -  55,868 mIU/mL  18 Weeks      8,099 -  58,176 mIU/mL  Results may be falsely decreased if patient taking Biotin.     URINALYSIS W/ CULTURE IF INDICATED   CBC AND DIFFERENTIAL    Narrative:     The following orders were created for panel order CBC & Differential.  Procedure                               Abnormality         Status                     ---------                               -----------         ------                     CBC Auto Differential[[id number]]        Abnormal            Final result                 Please view results for these tests on the individual orders.   GREEN TOP   LAVENDER TOP   GOLD TOP - SST   GRAY TOP   LIGHT BLUE TOP       Meds Given in ED:   Medications   sodium chloride 0.9 % flush 10 mL (has no administration in time range)   sodium chloride 0.9 % infusion (0 mL/hr Intravenous Stopped 1/13/24 0712)   methylPREDNISolone sodium succinate (SOLU-Medrol) 1 g in sodium chloride 0.9 % 100 mL IVPB (0 g Intravenous Stopped 1/13/24 0712)

## 2024-01-13 NOTE — ED PROVIDER NOTES
Subjective   History of Present Illness  43-year-old female presents to the emergency department accompanied by her spouse with concerns about numbness and difficulty walking.  The patient states that she was diagnosed with multiple sclerosis approximately 20 years ago in [place].  The patient moved to [place] approximately 10 years ago, but did not have significant problems with her MS, and did not establish care with a neurologist in [place].  She states that she has a history of lesions in the spinal cord in the past.  She was doing fairly well in her usual state of health until January 4 when she developed numbness to her left upper and lower extremity, which began in her hand and then moved to the rest of her arm and lower extremity.  She then did a bowel prep on January 7, and had a colonoscopy on January 8 which she reports was unremarkable.  She went to the hospital at [facility] on Tuesday, January 9 and was admitted there for an MS exacerbation, and treated with high-dose steroids.  There was a teleneurology consult performed during that hospitalization per patient.  She was discharged on Wednesday, January 10, 2023, on a prednisone taper, which is currently at 40 mg daily, and she states that she has been taking as prescribed.  Then later on January 10 following hospital discharge, she had numbness to her right upper and right lower extremity.  She states that her symptoms are causing her to have worsened difficulty using her left hand, and worsened difficulty walking.  She states that she could not get a follow-up appointment with a neurologist in person until mid March, and called several different neurology offices.  Therefore, she and her  decided to drive up to [place] and present to our emergency department tonight.      Review of Systems   Constitutional:  Negative for diaphoresis and fever.   HENT:  Negative for facial swelling.    Eyes:  Negative for photophobia and  discharge.   Respiratory:  Negative for stridor.    Cardiovascular:         She reports a pressure-like sensation to her chest diffusely.   Gastrointestinal:         She reports a pressure-like sensation to her abdomen diffusely.   Musculoskeletal:  Positive for gait problem.   Neurological:  Positive for numbness.       Past Medical History:   Diagnosis Date    Disease of thyroid gland     Multiple sclerosis        No Known Allergies    Past Surgical History:   Procedure Laterality Date    CHOLECYSTECTOMY      ENDOMETRIAL ABLATION         Family History   Problem Relation Age of Onset    Breast cancer Maternal Aunt        Social History     Socioeconomic History    Marital status:    Tobacco Use    Smoking status: Never   Vaping Use    Vaping Use: Never used   Substance and Sexual Activity    Alcohol use: No    Drug use: No    Sexual activity: Yes     Partners: Male           Objective   Physical Exam  Vitals and nursing note reviewed.   Constitutional:       General: She is not in acute distress.     Appearance: She is not diaphoretic.      Comments: BMI 34.  Excellent historian.   Eyes:      General: No scleral icterus.     Extraocular Movements: Extraocular movements intact.      Pupils: Pupils are equal, round, and reactive to light.      Comments: No photophobia or nystagmus.   Cardiovascular:      Rate and Rhythm: Normal rate and regular rhythm.      Heart sounds: Normal heart sounds. No murmur heard.     No friction rub. No gallop.      Comments: S1, S2  Pulmonary:      Effort: Pulmonary effort is normal. No respiratory distress.      Breath sounds: Normal breath sounds. No stridor. No wheezing, rhonchi or rales.   Abdominal:      General: There is no distension.      Palpations: Abdomen is soft.      Tenderness: There is no abdominal tenderness. There is no guarding.   Musculoskeletal:         General: No deformity.      Comments: No significant peripheral edema.   Skin:     General: Skin is warm and  dry.      Coloration: Skin is not jaundiced.   Neurological:      Mental Status: She is alert.      Comments: Decreased sensation to light touch to all 4 extremities.  Motor 5 out of 5 power x 4 extremities, symmetric.  No facial droop.  Normal speech, no dysarthria.         Procedures           ED Course  ED Course as of 01/13/24 0827   Sat Jan 13, 2024   0606 Case discussed with neurologist, , who agrees with plan for high dose steroid IV and admit to hospitalist, neurology to see in consultation. [LD]      ED Course User Index  [LD] Em Cox MD                                             Medical Decision Making  Differential diagnosis includes MS exacerbation of the brain, MS exacerbation of the cervical spine, electrolyte abnormality, transverse myelitis, less likely stroke or TIA, and others.    Problems Addressed:  Gait disturbance: complicated acute illness or injury  Left sided numbness: complicated acute illness or injury  Multiple sclerosis exacerbation: complicated acute illness or injury  Right sided numbness: complicated acute illness or injury    Amount and/or Complexity of Data Reviewed  Independent Historian: spouse     Details: At bedside  External Data Reviewed: radiology and notes.     Details: MRI brain from 1/9/2024 report reviewed.  I reviewed the discharge summary from 1/10/2024, but it is currently incomplete.  Labs: ordered. Decision-making details documented in ED Course.  Radiology: ordered. Decision-making details documented in ED Course.  ECG/medicine tests: ordered and independent interpretation performed. Decision-making details documented in ED Course.     Details: EKG at 0254 shows normal sinus rhythm at a rate of 71 bpm, incomplete right bundle branch block.  Discussion of management or test interpretation with external provider(s): Neurologist Dr. Colón, see ED course. Hospitalist Dr. Aguilar by [facility] chat at [place] [phone]    Risk  Prescription drug  management.  Decision regarding hospitalization.      Recent Results (from the past 24 hour(s))   Single High Sensitivity Troponin T    Collection Time: 01/13/24  2:54 AM    Specimen: Blood   Result Value Ref Range    HS Troponin T 6 <14 ng/L   Comprehensive Metabolic Panel    Collection Time: 01/13/24  2:54 AM    Specimen: Blood   Result Value Ref Range    Glucose 112 (H) 65 - 99 mg/dL    BUN 18 6 - 20 mg/dL    Creatinine 0.70 0.57 - 1.00 mg/dL    Sodium 141 136 - 145 mmol/L    Potassium 3.7 3.5 - 5.2 mmol/L    Chloride 104 98 - 107 mmol/L    CO2 27.0 22.0 - 29.0 mmol/L    Calcium 8.4 (L) 8.6 - 10.5 mg/dL    Total Protein 7.6 6.0 - 8.5 g/dL    Albumin 4.2 3.5 - 5.2 g/dL    ALT (SGPT) 59 (H) 1 - 33 U/L    AST (SGOT) 48 (H) 1 - 32 U/L    Alkaline Phosphatase 68 39 - 117 U/L    Total Bilirubin 0.7 0.0 - 1.2 mg/dL    Globulin 3.4 gm/dL    A/G Ratio 1.2 g/dL    BUN/Creatinine Ratio 25.7 (H) 7.0 - 25.0    Anion Gap 10.0 5.0 - 15.0 mmol/L    eGFR 108.8 >60.0 mL/min/1.73   Green Top (Gel)    Collection Time: 01/13/24  2:54 AM   Result Value Ref Range    Extra Tube Hold for add-ons.    Lavender Top    Collection Time: 01/13/24  2:54 AM   Result Value Ref Range    Extra Tube hold for add-on    Gold Top - SST    Collection Time: 01/13/24  2:54 AM   Result Value Ref Range    Extra Tube Hold for add-ons.    Gray Top    Collection Time: 01/13/24  2:54 AM   Result Value Ref Range    Extra Tube Hold for add-ons.    Light Blue Top    Collection Time: 01/13/24  2:54 AM   Result Value Ref Range    Extra Tube Hold for add-ons.    CBC Auto Differential    Collection Time: 01/13/24  2:54 AM    Specimen: Blood   Result Value Ref Range    WBC 14.47 (H) 3.40 - 10.80 10*3/mm3    RBC 4.75 3.77 - 5.28 10*6/mm3    Hemoglobin 13.9 12.0 - 15.9 g/dL    Hematocrit 41.6 34.0 - 46.6 %    MCV 87.6 79.0 - 97.0 fL    MCH 29.3 26.6 - 33.0 pg    MCHC 33.4 31.5 - 35.7 g/dL    RDW 12.5 12.3 - 15.4 %    RDW-SD 40.2 37.0 - 54.0 fl    MPV 9.5 6.0 - 12.0 fL     Platelets 321 140 - 450 10*3/mm3    Neutrophil % 66.5 42.7 - 76.0 %    Lymphocyte % 26.2 19.6 - 45.3 %    Monocyte % 6.5 5.0 - 12.0 %    Eosinophil % 0.1 (L) 0.3 - 6.2 %    Basophil % 0.1 0.0 - 1.5 %    Immature Grans % 0.6 (H) 0.0 - 0.5 %    Neutrophils, Absolute 9.63 (H) 1.70 - 7.00 10*3/mm3    Lymphocytes, Absolute 3.79 (H) 0.70 - 3.10 10*3/mm3    Monocytes, Absolute 0.94 (H) 0.10 - 0.90 10*3/mm3    Eosinophils, Absolute 0.01 0.00 - 0.40 10*3/mm3    Basophils, Absolute 0.02 0.00 - 0.20 10*3/mm3    Immature Grans, Absolute 0.08 (H) 0.00 - 0.05 10*3/mm3    nRBC 0.0 0.0 - 0.2 /100 WBC   hCG, Quantitative, Pregnancy    Collection Time: 01/13/24  2:54 AM    Specimen: Blood   Result Value Ref Range    HCG Quantitative <0.10 mIU/mL   ECG 12 Lead Chest Pain    Collection Time: 01/13/24  2:54 AM   Result Value Ref Range    QT Interval 408 ms    QTC Interval 443 ms   Single High Sensitivity Troponin T    Collection Time: 01/13/24  6:39 AM    Specimen: Blood   Result Value Ref Range    HS Troponin T 8 <14 ng/L   Urinalysis With Culture If Indicated - Urine, Clean Catch    Collection Time: 01/13/24  7:02 AM    Specimen: Urine, Clean Catch   Result Value Ref Range    Color, UA Yellow Yellow, Straw    Appearance, UA Clear Clear    pH, UA 6.5 5.0 - 8.0    Specific Gravity, UA 1.029 1.001 - 1.030    Glucose, UA Negative Negative    Ketones, UA Negative Negative    Bilirubin, UA Negative Negative    Blood, UA Negative Negative    Protein, UA 30 mg/dL (1+) (A) Negative    Leuk Esterase, UA Negative Negative    Nitrite, UA Negative Negative    Urobilinogen, UA 1.0 E.U./dL 0.2 - 1.0 E.U./dL   Urinalysis, Microscopic Only - Urine, Clean Catch    Collection Time: 01/13/24  7:02 AM    Specimen: Urine, Clean Catch   Result Value Ref Range    RBC, UA 11-20 (A) None Seen, 0-2 /HPF    WBC, UA 3-5 (A) None Seen, 0-2 /HPF    Bacteria, UA Trace None Seen, Trace /HPF    Squamous Epithelial Cells, UA 0-2 None Seen, 0-2 /HPF    Hyaline Casts, UA  0-6 0 - 6 /LPF    Methodology Automated Microscopy      Note: In addition to lab results from this visit, the labs listed above may include labs taken at another facility or during a different encounter within the last 24 hours. Please correlate lab times with ED admission and discharge times for further clarification of the services performed during this visit.    XR Chest 1 View   Final Result   Impression:   No active disease         Electronically Signed: Abran Hope MD     1/13/2024 3:08 AM EST     Workstation ID: [id number]      CT Head Without Contrast   Final Result   Impression:   No acute intracranial abnormality            Electronically Signed: Abran Hope MD     1/13/2024 2:56 AM EST     Workstation ID: [id number]        Vitals:    01/13/24 0645 01/13/24 0732 01/13/24 0745 01/13/24 0800   BP: 122/71 143/85     Pulse: 68 67 61 60   Resp:       Temp:       SpO2: 97% 96% 92% 94%   Weight:       Height:         Medications   sodium chloride 0.9 % flush 10 mL (has no administration in time range)   sodium chloride 0.9 % flush 10 mL (has no administration in time range)   sodium chloride 0.9 % flush 10 mL (has no administration in time range)   sodium chloride 0.9 % infusion 40 mL (has no administration in time range)   sennosides-docusate (PERICOLACE) 8.6-50 MG per tablet 2 tablet (has no administration in time range)     And   polyethylene glycol (MIRALAX) packet 17 g (has no administration in time range)     And   bisacodyl (DULCOLAX) EC tablet 5 mg (has no administration in time range)     And   bisacodyl (DULCOLAX) suppository 10 mg (has no administration in time range)   acetaminophen (TYLENOL) tablet 650 mg (has no administration in time range)   sodium chloride 0.9 % infusion (0 mL/hr Intravenous Stopped 1/13/24 0712)   methylPREDNISolone sodium succinate (SOLU-Medrol) 1 g in sodium chloride 0.9 % 100 mL IVPB (0 g Intravenous Stopped 1/13/24 0712)     ECG/EMG Results (last 24 hours)       ** No results  found for the last 24 hours. **          ECG 12 Lead Chest Pain   Preliminary Result   Test Reason : Chest Pain   Blood Pressure :   */*   mmHG   Vent. Rate :  71 BPM     Atrial Rate :  71 BPM      P-R Int : 166 ms          QRS Dur : 112 ms       QT Int : 408 ms       P-R-T Axes :  58  34  43 degrees      QTc Int : 443 ms      Normal sinus rhythm   Possible Left atrial enlargement   Incomplete right bundle branch block   Borderline ECG   When compared with ECG of 09-JAN-2024 10:49,   No significant change was found      Referred By: EDMD           Confirmed By:               Final diagnoses:   Multiple sclerosis exacerbation   Left sided numbness   Right sided numbness   Gait disturbance       ED Disposition  ED Disposition       ED Disposition   Decision to Admit    Condition   --    Comment   Level of Care: Telemetry [5]   Diagnosis: Multiple sclerosis exacerbation [680917]   Admitting Physician: MONET COFFEY III [[id number]   Attending Physician: MONET COFFEY III [[id number]]   Bed Request Comments: tele obs (not CDU)                 No follow-up provider specified.       Medication List      No changes were made to your prescriptions during this visit.            Em Cox MD  01/13/24 0616       Em Cox MD  01/13/24 0894

## 2024-01-13 NOTE — PLAN OF CARE
Goal Outcome Evaluation:      Patient got admitted to the floor with Rt and Lt sided Numbness and Gait disturbance .Vital signs within Normal limits.On Room air.Ambulates with stand by assist.Waiting for MRI to be done.

## 2024-01-14 LAB
25(OH)D3 SERPL-MCNC: 27.7 NG/ML (ref 30–100)
HIV 1+2 AB+HIV1 P24 AG SERPL QL IA: NORMAL
RPR SER QL: REACTIVE
RPR SER-TITR: ABNORMAL {TITER}

## 2024-01-14 PROCEDURE — 86618 LYME DISEASE ANTIBODY: CPT | Performed by: PSYCHIATRY & NEUROLOGY

## 2024-01-14 PROCEDURE — 82164 ANGIOTENSIN I ENZYME TEST: CPT | Performed by: PSYCHIATRY & NEUROLOGY

## 2024-01-14 PROCEDURE — G0432 EIA HIV-1/HIV-2 SCREEN: HCPCS | Performed by: PSYCHIATRY & NEUROLOGY

## 2024-01-14 PROCEDURE — 82306 VITAMIN D 25 HYDROXY: CPT | Performed by: PSYCHIATRY & NEUROLOGY

## 2024-01-14 PROCEDURE — 99232 SBSQ HOSP IP/OBS MODERATE 35: CPT | Performed by: PSYCHIATRY & NEUROLOGY

## 2024-01-14 PROCEDURE — 86362 MOG-IGG1 ANTB CBA EACH: CPT | Performed by: PSYCHIATRY & NEUROLOGY

## 2024-01-14 PROCEDURE — 99232 SBSQ HOSP IP/OBS MODERATE 35: CPT | Performed by: INTERNAL MEDICINE

## 2024-01-14 PROCEDURE — 86051 AQUAPORIN-4 ANTB ELISA: CPT | Performed by: PSYCHIATRY & NEUROLOGY

## 2024-01-14 PROCEDURE — G0378 HOSPITAL OBSERVATION PER HR: HCPCS

## 2024-01-14 PROCEDURE — 25010000002 METHYLPREDNISOLONE PER 125 MG

## 2024-01-14 PROCEDURE — 86592 SYPHILIS TEST NON-TREP QUAL: CPT | Performed by: PSYCHIATRY & NEUROLOGY

## 2024-01-14 PROCEDURE — 86147 CARDIOLIPIN ANTIBODY EA IG: CPT | Performed by: PSYCHIATRY & NEUROLOGY

## 2024-01-14 PROCEDURE — 86593 SYPHILIS TEST NON-TREP QUANT: CPT | Performed by: PSYCHIATRY & NEUROLOGY

## 2024-01-14 PROCEDURE — 86780 TREPONEMA PALLIDUM: CPT | Performed by: PSYCHIATRY & NEUROLOGY

## 2024-01-14 RX ADMIN — PANTOPRAZOLE SODIUM 40 MG: 40 TABLET, DELAYED RELEASE ORAL at 05:11

## 2024-01-14 RX ADMIN — LEVOTHYROXINE SODIUM 88 MCG: 0.09 TABLET ORAL at 05:11

## 2024-01-14 RX ADMIN — Medication 10 ML: at 21:33

## 2024-01-14 RX ADMIN — SODIUM CHLORIDE 1000 MG: 900 INJECTION INTRAVENOUS at 08:05

## 2024-01-14 RX ADMIN — Medication 10 ML: at 08:05

## 2024-01-14 RX ADMIN — SENNOSIDES AND DOCUSATE SODIUM 2 TABLET: 8.6; 5 TABLET ORAL at 21:33

## 2024-01-14 NOTE — PLAN OF CARE
Problem: Adult Inpatient Plan of Care  Goal: Plan of Care Review  1/14/2024 0644 by Srinath Oleary RN  Outcome: Ongoing, Progressing  1/14/2024 0642 by Srinath Oleary RN  Outcome: Ongoing, Progressing  Goal: Patient-Specific Goal (Individualized)  1/14/2024 0644 by Srinath Oleary RN  Outcome: Ongoing, Progressing  1/14/2024 0642 by Srinath Oleary RN  Outcome: Ongoing, Progressing  Goal: Absence of Hospital-Acquired Illness or Injury  1/14/2024 0644 by Srinath Oleary RN  Outcome: Ongoing, Progressing  1/14/2024 0642 by Srinath Oleary RN  Outcome: Ongoing, Progressing  Intervention: Identify and Manage Fall Risk  Recent Flowsheet Documentation  Taken 1/14/2024 0410 by Srinath Oleary RN  Safety Promotion/Fall Prevention:   activity supervised   assistive device/personal items within reach   clutter free environment maintained   fall prevention program maintained   gait belt   mobility aid in reach   nonskid shoes/slippers when out of bed   room organization consistent   safety round/check completed  Taken 1/14/2024 0005 by Srinath Oleary RN  Safety Promotion/Fall Prevention:   activity supervised   assistive device/personal items within reach   clutter free environment maintained   fall prevention program maintained   gait belt   mobility aid in reach   nonskid shoes/slippers when out of bed   room organization consistent   safety round/check completed  Taken 1/13/2024 2000 by Srinath Oleary RN  Safety Promotion/Fall Prevention:   activity supervised   assistive device/personal items within reach   clutter free environment maintained   fall prevention program maintained   gait belt   mobility aid in reach   nonskid shoes/slippers when out of bed   room organization consistent   safety round/check completed  Intervention: Prevent Skin Injury  Recent Flowsheet Documentation  Taken 1/14/2024 0611 by Srinath Oleary RN  Body Position: position changed independently  Taken 1/14/2024 0410 by Srinath Oleary  RN  Body Position: position changed independently  Taken 1/14/2024 0005 by Srinath Oleary RN  Body Position: position changed independently  Taken 1/13/2024 2000 by Srinath Oleary RN  Body Position: position changed independently  Skin Protection: adhesive use limited  Intervention: Prevent and Manage VTE (Venous Thromboembolism) Risk  Recent Flowsheet Documentation  Taken 1/14/2024 0611 by Srinath Oleary RN  VTE Prevention/Management:   bilateral   sequential compression devices on  Taken 1/14/2024 0410 by Srinath Oleary RN  VTE Prevention/Management:   bilateral   sequential compression devices on  Taken 1/14/2024 0005 by Srinath Oleary RN  VTE Prevention/Management:   bilateral   sequential compression devices on  Taken 1/13/2024 2000 by Srinath Oleary RN  Activity Management: ambulated to bathroom  VTE Prevention/Management:   bilateral   sequential compression devices on  Range of Motion: active ROM (range of motion) encouraged  Intervention: Prevent Infection  Recent Flowsheet Documentation  Taken 1/14/2024 0611 by Srinath Oleary RN  Infection Prevention:   hand hygiene promoted   rest/sleep promoted   visitors restricted/screened  Taken 1/14/2024 0410 by Srinath Oleary RN  Infection Prevention:   rest/sleep promoted   single patient room provided  Taken 1/14/2024 0005 by Srinath Oleary RN  Infection Prevention:   hand hygiene promoted   rest/sleep promoted   single patient room provided  Taken 1/13/2024 2000 by Srinath Oleary RN  Infection Prevention:   hand hygiene promoted   rest/sleep promoted   single patient room provided  Goal: Optimal Comfort and Wellbeing  1/14/2024 0644 by Srinath Oleary RN  Outcome: Ongoing, Progressing  1/14/2024 0642 by Srinath Oleary RN  Outcome: Ongoing, Progressing  Intervention: Provide Person-Centered Care  Recent Flowsheet Documentation  Taken 1/13/2024 2000 by Srinath Oleary RN  Trust Relationship/Rapport: care explained  Goal: Readiness for Transition of  Care  1/14/2024 0644 by Srinath Oleary RN  Outcome: Ongoing, Progressing  1/14/2024 0642 by Srinath Oleary RN  Outcome: Ongoing, Progressing     Problem: Fall Injury Risk  Goal: Absence of Fall and Fall-Related Injury  1/14/2024 0644 by Srinath Oleary RN  Outcome: Ongoing, Progressing  1/14/2024 0642 by Srinath Oleary RN  Outcome: Ongoing, Progressing  Intervention: Identify and Manage Contributors  Recent Flowsheet Documentation  Taken 1/14/2024 0410 by Srinath Oleary RN  Self-Care Promotion: independence encouraged  Taken 1/13/2024 2000 by Srinath Oleary RN  Medication Review/Management: medications reviewed  Intervention: Promote Injury-Free Environment  Recent Flowsheet Documentation  Taken 1/14/2024 0410 by Srinath Oleary RN  Safety Promotion/Fall Prevention:   activity supervised   assistive device/personal items within reach   clutter free environment maintained   fall prevention program maintained   gait belt   mobility aid in reach   nonskid shoes/slippers when out of bed   room organization consistent   safety round/check completed  Taken 1/14/2024 0005 by Srinath Oleary RN  Safety Promotion/Fall Prevention:   activity supervised   assistive device/personal items within reach   clutter free environment maintained   fall prevention program maintained   gait belt   mobility aid in reach   nonskid shoes/slippers when out of bed   room organization consistent   safety round/check completed  Taken 1/13/2024 2000 by Srinath Oleary RN  Safety Promotion/Fall Prevention:   activity supervised   assistive device/personal items within reach   clutter free environment maintained   fall prevention program maintained   gait belt   mobility aid in reach   nonskid shoes/slippers when out of bed   room organization consistent   safety round/check completed   Goal Outcome Evaluation:

## 2024-01-14 NOTE — PLAN OF CARE
Patient alert and oriented. States she has numbness to all extremities but the left hand is the worst. Ambulated in tran and in room with standby assist and gaitbelt.

## 2024-01-14 NOTE — PROGRESS NOTES
[facility] Medicine Services  PROGRESS NOTE    Patient Name: Starr Armenta  : 1980  MRN: [id number]    Date of Admission: 2024  Primary Care Physician: Alexandr Corbin PA-C    Subjective   Subjective     CC:  numbness and paresthesias    HPI:  No change.        Objective   Objective     Vital Signs:   Temp:  [98.2 °F (36.8 °C)-98.6 °F (37 °C)] 98.2 °F (36.8 °C)  Heart Rate:  [] 63  Resp:  [15-16] 16  BP: (110-158)/(73-91) 110/73     Physical Exam:  Gen:  WD/WN  Neuro: alert and oriented, clear speech, follows commands, grossly nonfocal, did not do detailed sen;keyla exam today   HEENT:  NC/AT PERRL, OP benign  Neck:  Supple, no LAD  Heart RRR no murmur, rub, or gallop  Abd:  Soft, nontender, no rebound or guarding, pos BS  Extrem:  No c/c/e      Results Reviewed:  LAB RESULTS:      Lab 24  1038 24  0639 24  0254 01/10/24  0130 24  1102   WBC  --   --  14.47* 7.13 7.38   HEMOGLOBIN  --   --  13.9 14.3 14.0   HEMOGLOBIN, POC 11.2*  --   --   --   --    HEMATOCRIT  --   --  41.6 41.5 41.9   HEMATOCRIT POC 33*  --   --   --   --    PLATELETS  --   --  321 313 283   NEUTROS ABS  --   --  9.63* 6.22 4.42   IMMATURE GRANS (ABS)  --   --  0.08* 0.04 0.03   LYMPHS ABS  --   --  3.79* 0.83 2.17   MONOS ABS  --   --  0.94* 0.02* 0.51   EOS ABS  --   --  0.01 0.00 0.15   MCV  --   --  87.6 84.7 86.2   CRP  --  <0.30  --   --   --          Lab 24  1038 24  0254 01/10/24  0130 24  1305 24  1102   SODIUM  --  141 139  --  139   POTASSIUM  --  3.7 3.8  --  4.0   CHLORIDE  --  104 103  --  103   CO2  --  27.0 21.4*  --  25.0   ANION GAP  --  10.0 14.6  --  11.0   BUN  --  18 10  --  9   CREATININE 1.00 0.70 1.02*  --  0.93   EGFR 70.9 108.8 69.3  --  77.4   GLUCOSE  --  112* 277*  --  95   CALCIUM  --  8.4* 9.1  --  9.5   TSH  --   --   --  5.780*  --          Lab 24  0254 01/10/24  0130 24  1102   TOTAL PROTEIN 7.6 7.8 7.7    ALBUMIN 4.2 4.3 4.4   GLOBULIN 3.4 3.5 3.3   ALT (SGPT) 59* 27 20   AST (SGOT) 48* 36* 33*   BILIRUBIN 0.7 0.5 0.7   ALK PHOS 68 92 81         Lab 01/13/24  0639 01/13/24  0254 01/09/24  1305 01/09/24  1102   HSTROP T 8 6 <6 <6             Lab 01/13/24  0254   FOLATE 12.10   VITAMIN B 12 440         Brief Urine Lab Results  (Last result in the past 365 days)        Color   Clarity   Blood   Leuk Est   Nitrite   Protein   CREAT   Urine HCG        01/13/24 0702 Yellow   Clear   Negative   Negative   Negative   30 mg/dL (1+)                   Microbiology Results Abnormal       None            XR Chest 1 View    Result Date: 1/13/2024  XR CHEST 1 VW Date of Exam: 1/13/2024 2:45 AM EST Indication: Acute Stroke Protocol (onset < 12 hrs) Comparison: Chest radiograph January 9, 2024 Findings: There are no airspace consolidations. No pleural fluid. No pneumothorax. The pulmonary vasculature appears within normal limits. The cardiac and mediastinal silhouette appear unremarkable. No acute osseous abnormality identified.     Impression: Impression: No active disease Electronically Signed: Abran Hope MD  1/13/2024 3:08 AM EST  Workstation ID: [id number]    CT Head Without Contrast    Result Date: 1/13/2024  CT HEAD WO CONTRAST Date of Exam: 1/13/2024 2:36 AM EST Indication: Transient ischemic attack (TIA) MS flare, numbness first to left side 8 days ago, then to right side for 2 days. Comparison: MRI of the brain dated January 9, 2024; CT scan head dated January 9, 2024 Technique: Axial CT images were obtained of the head without contrast administration.  Automated exposure control and iterative construction methods were used. Findings: There is no evidence of hemorrhage. There is no mass effect or midline shift. There is no extracerebral collection. Ventricles are normal in size and configuration for patient's stated age.  Posterior fossa is within normal limits. Calvarium and skull base appear intact.   Visualized sinuses  show no air fluid levels. Visualized orbits are unremarkable.     Impression: Impression: No acute intracranial abnormality Electronically Signed: Abran Hope MD  1/13/2024 2:56 AM EST  Workstation ID: [id number]         Current medications:  Scheduled Meds:gadobenate dimeglumine, 20 mL, Intravenous, Once in imaging  levothyroxine, 88 mcg, Oral, Q AM  methylPREDNISolone sodium succinate, 1,000 mg, Intravenous, Daily  pantoprazole, 40 mg, Oral, Q AM  senna-docusate sodium, 2 tablet, Oral, BID  sodium chloride, 10 mL, Intravenous, Q12H      Continuous Infusions:   PRN Meds:.  acetaminophen    senna-docusate sodium **AND** polyethylene glycol **AND** bisacodyl **AND** bisacodyl    sodium chloride    sodium chloride    sodium chloride    Assessment & Plan   Assessment & Plan     Active Hospital Problems    Diagnosis  POA    **Multiple sclerosis exacerbation [G35]  Yes      Resolved Hospital Problems   No resolved problems to display.        Brief Hospital Course to date:  Starr Armenta is a 43 y.o. female with MS diagnosed in [place] 20 yrs ago, now with flare which is persisting / worsening despite high-dose steroids given Jan 9-10 at [facility].  Returns to ED due to worsening symptoms     MS presumed flare  - Neurology consult appreciated   -  C and T  cord MRI w read pending.  consider LP.    -  needs to establish care with outpt clinic  - steroids      Hypothyroid, fatty liver       Expected Discharge Location and Transportation: home vs snf  Expected Discharge   Expected discharge date/ time has not been documented.     DVT prophylaxis:  Mechanical DVT prophylaxis orders are present.     AM-PAC 6 Clicks Score (PT): 20 (01/14/24 0005)    CODE STATUS:   Code Status and Medical Interventions:   Ordered at: 01/13/24 0821     Level Of Support Discussed With:    Patient     Code Status (Patient has no pulse and is not breathing):    CPR (Attempt to Resuscitate)     Medical Interventions (Patient has pulse or is breathing):     Full Support       Gail Lares MD  01/14/24

## 2024-01-14 NOTE — PROGRESS NOTES
Neurology Note    Patient:  Starr Armenta    YOB: 1980    REFERRING PHYSICIAN:  Dr. Lares    CHIEF COMPLAINT:    Left-sided numbness    HISTORY OF PRESENT ILLNESS:   The patient reports that left-sided numbness is still unchanged.    Past Medical History:  Past Medical History:   Diagnosis Date    Disease of thyroid gland     Multiple sclerosis        Past Surgical History:  Past Surgical History:   Procedure Laterality Date    CHOLECYSTECTOMY      ENDOMETRIAL ABLATION         Social History:   Social History     Socioeconomic History    Marital status:    Tobacco Use    Smoking status: Never   Vaping Use    Vaping Use: Never used   Substance and Sexual Activity    Alcohol use: No    Drug use: No    Sexual activity: Yes     Partners: Male        Family History:   Family History   Problem Relation Age of Onset    Breast cancer Maternal Aunt        Medications Prior to Admission:    Prior to Admission medications    Medication Sig Start Date End Date Taking? Authorizing Provider   levothyroxine (SYNTHROID, LEVOTHROID) 88 MCG tablet Take 1 tablet by mouth Daily.   Yes Therese Rossi MD   omeprazole (priLOSEC) 20 MG capsule Take 1 capsule by mouth Daily. 1/10/24  Yes Eze Fortune DO   predniSONE (DELTASONE) 10 MG tablet Take 4 tablets by mouth Daily for 7 days, THEN 3 tablets Daily for 7 days, THEN 2 tablets Daily for 7 days, THEN 1 tablet Daily for 7 days. 1/11/24 2/8/24 Yes Eze Fortune DO   vitamin D (ERGOCALCIFEROL) 1.25 MG (16912 UT) capsule capsule Take 1 capsule by mouth 1 (One) Time Per Week.   Yes Therese Rossi MD       Allergies:  Patient has no known allergies.      Review of system  Review of Systems   Neurological:  Positive for numbness.   All other systems reviewed and are negative.      Vitals:    01/14/24 1055   BP: 111/67   Pulse: 62   Resp: 16   Temp: 98.3 °F (36.8 °C)   SpO2:        Physical exam  Physical Exam  Cardiovascular:      Rate and Rhythm: Normal  rate and regular rhythm.   Pulmonary:      Effort: Pulmonary effort is normal.   Neurological:      Comments: Speech clear, no facial droop, moves limbs well, walks w/o help.           Lab Results   Component Value Date    WBC 14.47 (H) 01/13/2024    HGB 11.2 (L) 01/13/2024    HCT 33 (L) 01/13/2024    MCV 87.6 01/13/2024     01/13/2024     Lab Results   Component Value Date    GLUCOSE 112 (H) 01/13/2024    BUN 18 01/13/2024    CREATININE 1.00 01/13/2024    EGFRIFNONA 63 01/27/2020    BCR 25.7 (H) 01/13/2024    CO2 27.0 01/13/2024    CALCIUM 8.4 (L) 01/13/2024    ALBUMIN 4.2 01/13/2024    AST 48 (H) 01/13/2024    ALT 59 (H) 01/13/2024         Radiological Studies:   MRI Thoracic Spine With & Without Contrast    Result Date: 1/14/2024  MRI THORACIC SPINE W WO CONTRAST Date of Exam: 1/13/2024 9:33 PM EST Indication: MS, myelitis.  Comparison: None available. Technique:  Routine multiplanar/multisequence sequence images of the thoracic spine were obtained before and after the uneventful administration of 20 mL Multihance.  Findings: The alignment is anatomic. The vertebral body heights appear normal. There are mild degenerative endplate changes in the midthoracic spine. There is disc desiccation at T3-4. There is mild scattered facet arthropathy. The spinal canal and neural foramina  are widely patent throughout. The spinal cord appears normal in signal throughout. The posterior paravertebral soft tissues are unremarkable. No pathologic enhancement or mass is identified.     Impression: 1.Spinal cord appears normal, with no evidence of demyelination. 2.Mild multilevel degenerative changes of thoracic spine as described above. Electronically Signed: Hola Eubanks MD  1/14/2024 11:06 AM EST  Workstation ID: [id number]    MRI Cervical Spine With & Without Contrast    Result Date: 1/14/2024  MRI CERVICAL SPINE W WO CONTRAST Date of Exam: 1/13/2024 9:30 PM EST Indication: MS, myelitis.  Comparison: None available.  Technique:  Routine multiplanar/multisequence sequence images of the cervical spine were obtained before and after the uneventful administration of 20 mL Multihance.  Findings: The alignment is anatomic. The craniocervical junction appears intact. The vertebral body heights appear normal. There are mild degenerative endplate changes at C5-6 and C6-7. There are mild discogenic changes at C4-5 through C6-7. The partially visualized posterior fossa contents are unremarkable. There are several patchy areas of T2 hyperintensity within the spinal cord centrally at the C2 level, posteriorly at C3-4, and on the left at C5. There is associated enhancement in the spinal cord at C3-4 compatible with active demyelination. The prevertebral soft tissues are unremarkable. C2-3: No spinal canal or neural foraminal stenosis. C3-4: Mild bilateral facet arthropathy. Mild bilateral uncovertebral hypertrophy. No spinal canal stenosis. No neural foraminal stenosis. C4-5: Moderate bilateral facet arthropathy. Mild left uncovertebral hypertrophy. No spinal canal stenosis. Mild left neural foraminal stenosis. C5-6: Mild disc osteophyte complex. Moderate bilateral facet arthropathy. Mild bilateral uncovertebral hypertrophy. No spinal canal stenosis. No neural foraminal stenosis. C6-7: Mild disc bulge. Mild bilateral facet arthropathy. Mild left uncovertebral hypertrophy. No spinal canal stenosis. Mild left neural foraminal stenosis. C7-T1: Mild disc bulge. No spinal canal stenosis. No neural foraminal stenosis.     Impression: 1.Focal areas of spinal cord signal normality as described above, compatible with history of multiple sclerosis. There is associated enhancement of the spinal cord at C3-4, compatible with active demyelination. 2.Mild degenerative changes of cervical spine as described above. Electronically Signed: Hola Eubanks MD  1/14/2024 10:43 AM EST  Workstation ID: [id number]    XR Chest 1 View    Result Date: 1/13/2024  XR  CHEST 1 VW Date of Exam: 1/13/2024 2:45 AM EST Indication: Acute Stroke Protocol (onset < 12 hrs) Comparison: Chest radiograph January 9, 2024 Findings: There are no airspace consolidations. No pleural fluid. No pneumothorax. The pulmonary vasculature appears within normal limits. The cardiac and mediastinal silhouette appear unremarkable. No acute osseous abnormality identified.     Impression: No active disease Electronically Signed: Abran Hope MD  1/13/2024 3:08 AM EST  Workstation ID: [id number]    CT Head Without Contrast    Result Date: 1/13/2024  CT HEAD WO CONTRAST Date of Exam: 1/13/2024 2:36 AM EST Indication: Transient ischemic attack (TIA) MS flare, numbness first to left side 8 days ago, then to right side for 2 days. Comparison: MRI of the brain dated January 9, 2024; CT scan head dated January 9, 2024 Technique: Axial CT images were obtained of the head without contrast administration.  Automated exposure control and iterative construction methods were used. Findings: There is no evidence of hemorrhage. There is no mass effect or midline shift. There is no extracerebral collection. Ventricles are normal in size and configuration for patient's stated age.  Posterior fossa is within normal limits. Calvarium and skull base appear intact.   Visualized sinuses show no air fluid levels. Visualized orbits are unremarkable.     Impression: No acute intracranial abnormality Electronically Signed: Abran Hope MD  1/13/2024 2:56 AM EST  Workstation ID: [id number]    MRI Brain With & Without Contrast    Result Date: 1/9/2024  EXAM:   MR Head Without and With Intravenous Contrast  EXAM DATE:   1/9/2024 4:34 PM  CLINICAL HISTORY:   MS Flare  TECHNIQUE:   Magnetic resonance images of the head/brain without and with intravenous contrast in multiple planes.  COMPARISON:   No relevant prior studies available.  FINDINGS:   BRAIN AND EXTRA-AXIAL SPACES:  Unremarkable as visualized.  No hemorrhage.  No enhancing lesions  identified. There are however periventricular white matter T2 lesions consistent with MS. Not markedly different since 8/24/2023.   BONES/JOINTS:  See above.   SINUSES:  Unremarkable as visualized.  No acute sinusitis.   MASTOID AIR CELLS:  Unremarkable as visualized.  No mastoid effusion.   ORBITS:  Unremarkable as visualized.        No enhancing lesions identified. There are however periventricular white matter T2 lesions consistent with MS. Not markedly different since 8/24/2023.   This report was finalized on 1/9/2024 5:03 PM by Dr. Gaurav Connor MD.      CT Head Without Contrast    Result Date: 1/9/2024  EXAM:   CT Head Without Intravenous Contrast  EXAM DATE:   1/9/2024 11:29 AM  CLINICAL HISTORY:   Transient ischemic attack (TIA)  TECHNIQUE:   Axial computed tomography images of the head/brain without intravenous contrast.  Sagittal and coronal reformatted images were created and reviewed.  This CT exam was performed using one or more of the following dose reduction techniques:  automated exposure control, adjustment of the mA and/or kV according to patient size, and/or use of iterative reconstruction technique.  COMPARISON:   No relevant prior studies available.  FINDINGS:   BRAIN AND EXTRA-AXIAL SPACES:  Unremarkable as visualized.  No hemorrhage.  No significant white matter disease.  No edema.  No ventriculomegaly.   BONES/JOINTS:  Unremarkable as visualized.  No acute fracture.   SOFT TISSUES:  Unremarkable as visualized.   SINUSES:  Left maxillary sinus mucous retention cyst.   MASTOID AIR CELLS:  Unremarkable as visualized.  No mastoid effusion.        No acute findings in the head/brain.   This report was finalized on 1/9/2024 12:19 PM by Dr. Gaurav Connor MD.      XR Chest 1 View    Result Date: 1/9/2024  EXAM:   XR Chest, 1 View  EXAM DATE:   1/9/2024 10:53 AM  CLINICAL HISTORY:   Chest Pain Protocol  TECHNIQUE:   Frontal view of the chest.  COMPARISON:   January 20, 2023  FINDINGS:   LUNGS AND  PLEURAL SPACES:  Unremarkable as visualized.  No consolidation.  No pneumothorax.   HEART:  Unremarkable as visualized.  No cardiomegaly.   MEDIASTINUM:  Unremarkable as visualized.   BONES/JOINTS:  Unremarkable as visualized.        Unremarkable exam. No acute cardiopulmonary findings identified.   This report was finalized on 1/9/2024 12:10 PM by Dr. Gaurav Connor MD.         During this visit the following were done:  Labs Reviewed [x]    Labs Ordered []    Radiology Reports Reviewed [x]    Radiology Ordered []    EKG, echo, and/or stress test reviewed [x]    EEG results reviewed  []    EEG reviewed and interpreted per myself   []    Discussed case with neurointerventionalist or neuroradiologist []    Referring Provider Records Reviewed []    ER Records Reviewed []    Hospital Records Reviewed []    History Obtained From Family []    Radiological images view and Interpreted per myself [x]    Case Discussed with referring provider []     Decision to obtain and request outside records  []        Assessment and Plan     MS exacerbation, MRI c.spine with acute plaque at C3-4.  - Continue Medrol 1g IV x total of 5 doses.   - Will discuss further with Dr. Márquez, likely prompt outpatient follow-up.              Electronically signed by Kevin Colón MD on 1/14/2024 at 13:02 EST

## 2024-01-14 NOTE — PLAN OF CARE
Goal Outcome Evaluation:      Pt resting in bed with  at bedside. Pt strength moderately strong and equal in bilateral lower extremities. Left upper extremity remains slightly weaker than right side. Pt A&Ox4. VSS.

## 2024-01-15 ENCOUNTER — TELEPHONE (OUTPATIENT)
Dept: NEUROLOGY | Facility: CLINIC | Age: 44
End: 2024-01-15
Payer: COMMERCIAL

## 2024-01-15 LAB
ACE SERPL-CCNC: 56 U/L (ref 14–82)
B BURGDOR IGG+IGM SER QL IA: NEGATIVE
CARDIOLIPIN IGG SER IA-ACNC: <9 GPL U/ML (ref 0–14)
CARDIOLIPIN IGM SER IA-ACNC: <9 MPL U/ML (ref 0–12)

## 2024-01-15 PROCEDURE — 97110 THERAPEUTIC EXERCISES: CPT | Performed by: OCCUPATIONAL THERAPIST

## 2024-01-15 PROCEDURE — 99232 SBSQ HOSP IP/OBS MODERATE 35: CPT | Performed by: INTERNAL MEDICINE

## 2024-01-15 PROCEDURE — G0378 HOSPITAL OBSERVATION PER HR: HCPCS

## 2024-01-15 PROCEDURE — 99233 SBSQ HOSP IP/OBS HIGH 50: CPT | Performed by: PSYCHIATRY & NEUROLOGY

## 2024-01-15 PROCEDURE — 97161 PT EVAL LOW COMPLEX 20 MIN: CPT

## 2024-01-15 PROCEDURE — 25010000002 METHYLPREDNISOLONE PER 125 MG

## 2024-01-15 PROCEDURE — 97165 OT EVAL LOW COMPLEX 30 MIN: CPT | Performed by: OCCUPATIONAL THERAPIST

## 2024-01-15 RX ORDER — MELATONIN
1000 DAILY
Status: DISCONTINUED | OUTPATIENT
Start: 2024-01-15 | End: 2024-01-18 | Stop reason: HOSPADM

## 2024-01-15 RX ADMIN — PANTOPRAZOLE SODIUM 40 MG: 40 TABLET, DELAYED RELEASE ORAL at 05:04

## 2024-01-15 RX ADMIN — Medication 1000 UNITS: at 13:40

## 2024-01-15 RX ADMIN — SODIUM CHLORIDE 1000 MG: 900 INJECTION INTRAVENOUS at 08:26

## 2024-01-15 RX ADMIN — LEVOTHYROXINE SODIUM 88 MCG: 0.09 TABLET ORAL at 05:04

## 2024-01-15 NOTE — THERAPY EVALUATION
Patient Name: Starr Armenta  : 1980    MRN: [id number]                              Today's Date: 1/15/2024       Admit Date: 2024    Visit Dx:     ICD-10-CM ICD-9-CM   1. Multiple sclerosis exacerbation  G35 340   2. Left sided numbness  R20.0 782.0   3. Right sided numbness  R20.0 782.0   4. Gait disturbance  R26.9 781.2     Patient Active Problem List   Diagnosis    MS (multiple sclerosis)    Multiple sclerosis exacerbation     Past Medical History:   Diagnosis Date    Disease of thyroid gland     Multiple sclerosis      Past Surgical History:   Procedure Laterality Date    CHOLECYSTECTOMY      ENDOMETRIAL ABLATION        General Information       Row Name 01/15/24 1551          Physical Therapy Time and Intention    Document Type evaluation  -     Mode of Treatment physical therapy  -       Row Name 01/15/24 9061          General Information    Patient Profile Reviewed yes  -     Prior Level of Function independent:;all household mobility;community mobility;gait;bed mobility;ADL's;driving;work  No AD use at baseline. Denies any recent falls. WFH on computer.  -     Existing Precautions/Restrictions other (see comments)  decreased sensation L UE > L LE  -     Barriers to Rehab medically complex  -       Row Name 01/15/24 1551          Living Environment    People in Home spouse;child(dmitriy), dependent;other relative(s)  11 and 11 y/o children, MIL  -       Row Name 01/15/24 1551          Home Main Entrance    Number of Stairs, Main Entrance two  -     Stair Railings, Main Entrance none  -       Row Name 01/15/24 1551          Stairs Within Home, Primary    Stairs, Within Home, Primary Bedroom on second story. Able to stay on main level if needed  -     Number of Stairs, Within Home, Primary twelve  -     Stair Railings, Within Home, Primary railing on right side (ascending)  -       Row Name 01/15/24 0453          Cognition    Orientation Status (Cognition) oriented x 4  -        Row Name 01/15/24 1551          Safety Issues, Functional Mobility    Safety Issues Affecting Function (Mobility) safety precaution awareness  -     Impairments Affecting Function (Mobility) balance;sensation/sensory awareness  -               User Key  (r) = Recorded By, (t) = Taken By, (c) = Cosigned By      Initials Name Provider Type     Jessenia Herrera, MORALES Physical Therapist                   Mobility       [facility] Name 01/15/24 1553          Bed Mobility    Bed Mobility supine-sit  -     Supine-Sit Rains (Bed Mobility) modified independence  -     Assistive Device (Bed Mobility) head of bed elevated  -[facility] Name 01/15/24 1553          Transfers    Comment, (Transfers) No cues or physical assist needed  -[facility] Name 01/15/24 1553          Sit-Stand Transfer    Sit-Stand Rains (Transfers) independent  -     Comment, (Sit-Stand Transfer) STS x1 from EOB. Denied dizziness in standing  -[facility] Name 01/15/24 1553          Gait/Stairs (Locomotion)    Rains Level (Gait) standby assist  -     Assistive Device (Gait) other (see comments)  none  -     Patient was able to Ambulate yes  -     Distance in Feet (Gait) 350  -     Deviations/Abnormal Patterns (Gait) base of support, narrow;stride length decreased  -     [place] Level (Stairs) contact guard;verbal cues  -     Handrail Location (Stairs) right side (ascending);left side (descending)  -     Number of Steps (Stairs) 12  -     Ascending Technique (Stairs) step-over-step  -     Descending Technique (Stairs) step-to-step  -     Comment, (Gait/Stairs) Pt demo step through gait pattern at normal gait speed. Cues for forward gaze as pt tends to look at ground. Mild unsteadiness noted while turning. No overt LOB noted. Pt reports not ambulating at baseline d/t decreased sensation in L foot. Pt completed stair training w/o LOB. Pt required CGA for safety d/t pt reporting being unable to feel rail  w/ L hand d/t decreased sensation.  -               User Key  (r) = Recorded By, (t) = Taken By, (c) = Cosigned By      Initials Name Provider Type     Jessenia Herrera PT Physical Therapist                   Obj/Interventions       Row Name 01/15/24 1559          Range of Motion Comprehensive    General Range of Motion bilateral lower extremity ROM WFL  -[facility] Name 01/15/24 1559          Strength Comprehensive (MMT)    General Manual Muscle Testing (MMT) Assessment no strength deficits identified  -     Comment, General Manual Muscle Testing (MMT) Assessment B LE grossly 5/5. No asymmetries noted  -[facility] Name 01/15/24 1559          Motor Skills    Motor Skills coordination  -     Coordination WNL;bilateral;lower extremity;heel to shin  -[facility] Name 01/15/24 1559          Balance    Balance Assessment sitting static balance;sitting dynamic balance;sit to stand dynamic balance;standing static balance;standing dynamic balance  -     Static Sitting Balance independent  -     Dynamic Sitting Balance independent  -     Position, Sitting Balance unsupported;sitting edge of bed  -     Sit to Stand Dynamic Balance standby assist;verbal cues  -     Static Standing Balance independent  -     Dynamic Standing Balance standby assist;verbal cues  -     Position/Device Used, Standing Balance unsupported  -     Balance Interventions sitting;standing;sit to stand;supported;static;dynamic  -[facility] Name 01/15/24 1551          Sensory Assessment (Somatosensory)    Sensory Assessment (Somatosensory) left LE;right LE  -     Left LE Sensory Assessment general sensation;light touch awareness;intact;other (see comments)  decreased sensation in foot in WB  -     Right LE Sensory Assessment general sensation;light touch awareness;intact  -               User Key  (r) = Recorded By, (t) = Taken By, (c) = Cosigned By      Initials Name Provider Type     Jessenia Herrera PT Physical  Therapist                   Goals/Plan       Row Name 01/15/24 1607          Transfer Goal 1 (PT)    Activity/Assistive Device (Transfer Goal 1, PT) --  -     [place] Level/Cues Needed (Transfer Goal 1, PT) --  -     Time Frame (Transfer Goal 1, PT) --  -LH       Row Name 01/15/24 1607          Gait Training Goal 1 (PT)    Activity/Assistive Device (Gait Training Goal 1, PT) gait (walking locomotion)  -     [place] Level (Gait Training Goal 1, PT) independent  -     Distance (Gait Training Goal 1, PT) 1000 ft  -     Time Frame (Gait Training Goal 1, PT) long term goal (LTG);10 days  -LH       Row Name 01/15/24 1607          Stairs Goal 1 (PT)    Activity/Assistive Device (Stairs Goal 1, PT) ascending stairs;descending stairs;using handrail, right  -     [place] Level/Cues Needed (Stairs Goal 1, PT) modified independence  -     Number of Stairs (Stairs Goal 1, PT) 12  -     Time Frame (Stairs Goal 1, PT) long term goal (LTG);10 days  -LH       Row Name 01/15/24 1601          Therapy Assessment/Plan (PT)    Planned Therapy Interventions (PT) balance training;bed mobility training;gait training;home exercise program;neuromuscular re-education;patient/family education;postural re-education;ROM (range of motion);stair training;strengthening;stretching;transfer training  -               User Key  (r) = Recorded By, (t) = Taken By, (c) = Cosigned By      Initials Name Provider Type     Jessenia Herrera, PT Physical Therapist                   Clinical Impression       Row Name 01/15/24 1603          Pain    Pretreatment Pain Rating 0/10 - no pain  -     Posttreatment Pain Rating 0/10 - no pain  -LH       Row Name 01/15/24 1603          Plan of Care Review    Plan of Care Reviewed With patient  -     Outcome Evaluation PT eval completed. Pt presents slightly below baseline function d/t decreased balance and decreased sensation in L UE and L LE. Pt required SBA to ambulate 350 ft  unsupported and CGA to navigate 12 stairs using handrails. She may benefit from completing dynamic balance activities. Pt would benefit from skilled IP PT. Recommend home w/ assist at d/c.  -[facility] Name 01/15/24 1603          Therapy Assessment/Plan (PT)    Patient/Family Therapy Goals Statement (PT) to go home  -     Rehab Potential (PT) good, to achieve stated therapy goals  -     Criteria for Skilled Interventions Met (PT) yes;meets criteria;skilled treatment is necessary  -     Therapy Frequency (PT) daily  -[facility] Name 01/15/24 1603          Vital Signs    Pre Systolic BP Rehab 136  -     Pre Treatment Diastolic BP 88  -     O2 Delivery Pre Treatment room air  -     O2 Delivery Intra Treatment room air  -     O2 Delivery Post Treatment room air  -     Pre Patient Position Supine  -     Intra Patient Position Standing  -     Post Patient Position Sitting  -[facility] Name 01/15/24 1603          Positioning and Restraints    Pre-Treatment Position in bed  -     Post Treatment Position chair  -     In Chair sitting;call light within reach;encouraged to call for assist;waffle cushion;notified [facility]               User Key  (r) = Recorded By, (t) = Taken By, (c) = Cosigned By      Initials Name Provider Type     Jessenia Herrera, PT Physical Therapist                   Outcome Measures       [facility] Name 01/15/24 1608 01/15/24 0830       How much help from another person do you currently need...    Turning from your back to your side while in flat bed without using bedrails? 4  - 4  -SJ    Moving from lying on back to sitting on the side of a flat bed without bedrails? 4  - 4  -SJ    Moving to and from a bed to a chair (including a wheelchair)? 4  - 4  -SJ    Standing up from a chair using your arms (e.g., wheelchair, bedside chair)? 4  - 4  -SJ    Climbing 3-5 steps with a railing? 3  - 4  -SJ    To walk in hospital room? 3  - 4  -SJ    AM-PAC 6 Clicks Score (PT) 22   - 24  -    Highest Level of Mobility Goal 7 --> Walk 25 feet or more  - 8 --> Walked 250 feet or more  -      Row Name 01/15/24 1608          Functional Assessment    Outcome Measure Options AM-PAC 6 Clicks Basic Mobility (PT)  -               User Key  (r) = Recorded By, (t) = Taken By, (c) = Cosigned By      Initials Name Provider Type     Ana Cristina Luciano RN Registered Nurse    Jessenia Lara, MORALES Physical Therapist                                 Physical Therapy Education       Title: PT OT SLP Therapies (In Progress)       Topic: Physical Therapy (In Progress)       Point: Mobility training (Done)       Learning Progress Summary             Patient Acceptance, E, VU by  at 1/15/2024 1608    Comment: Educated pt on safety w/ mobility, PT POC, and d/c planning                         Point: Home exercise program (Not Started)       Learner Progress:  Not documented in this visit.              Point: Body mechanics (Done)       Learning Progress Summary             Patient Acceptance, E, VU by  at 1/15/2024 1608    Comment: Educated pt on safety w/ mobility, PT POC, and d/c planning                         Point: Precautions (Done)       Learning Progress Summary             Patient Acceptance, E, VU by  at 1/15/2024 1608    Comment: Educated pt on safety w/ mobility, PT POC, and d/c planning                                         User Key       Initials Effective Dates Name Provider Type [facility] 09/21/23 -  Jessenia Herrera, MORALES Physical Therapist PT                  PT Recommendation and Plan  Planned Therapy Interventions (PT): balance training, bed mobility training, gait training, home exercise program, neuromuscular re-education, patient/family education, postural re-education, ROM (range of motion), stair training, strengthening, stretching, transfer training  Plan of Care Reviewed With: patient  Outcome Evaluation: PT eval completed. Pt presents slightly below baseline  function d/t decreased balance and decreased sensation in L UE and L LE. Pt required SBA to ambulate 350 ft unsupported and CGA to navigate 12 stairs using handrails. She may benefit from completing dynamic balance activities. Pt would benefit from skilled IP PT. Recommend home w/ assist at d/c.     Time Calculation:   PT Evaluation Complexity  History, PT Evaluation Complexity: 3 or more personal factors and/or comorbidities  Examination of Body Systems (PT Eval Complexity): total of 4 or more elements  Clinical Presentation (PT Evaluation Complexity): stable  Clinical Decision Making (PT Evaluation Complexity): low complexity  Overall Complexity (PT Evaluation Complexity): low complexity     PT Charges       Row Name 01/15/24 1609             Time Calculation    Start Time 1532  -LH      PT Received On 01/15/24  -      PT Goal Re-Cert Due Date 01/25/24  -         Untimed Charges    PT Eval/Re-eval Minutes 48  -LH         Total Minutes    Untimed Charges Total Minutes 48  -LH       Total Minutes 48  -LH                User Key  (r) = Recorded By, (t) = Taken By, (c) = Cosigned By      Initials Name Provider Type     Jessenia Herrera, PT Physical Therapist                  Therapy Charges for Today       Code Description Service Date Service Provider Modifiers Qty    [id number]  PT EVAL LOW COMPLEXITY 4 1/15/2024 Jessenia Herrera, PT GP 1            PT G-Codes  Outcome Measure Options: AM-PAC 6 Clicks Basic Mobility (PT)  AM-PAC 6 Clicks Score (PT): 22  PT Discharge Summary  Anticipated Discharge Disposition (PT): home with assist    Jessenia Herrera PT  1/15/2024

## 2024-01-15 NOTE — PLAN OF CARE
Goal Outcome Evaluation:       Pt resting in bed. Pt strength moderately strong and equal in bilateral lower extremities. Left upper extremity remains slightly weaker than right side. Pt A&Ox4. VSS. Pt reports numbness in extremities, especially distal LUE. Pt ambulates with standby assist. UOP adequate.

## 2024-01-15 NOTE — PLAN OF CARE
Goal Outcome Evaluation:  Plan of Care Reviewed With: patient           Outcome Evaluation: PT eval completed. Pt presents slightly below baseline function d/t decreased balance and decreased sensation in L UE and L LE. Pt required SBA to ambulate 350 ft unsupported and CGA to navigate 12 stairs using handrails. She may benefit from completing dynamic balance activities. Pt would benefit from skilled IP PT. Recommend home w/ assist at d/c.      Anticipated Discharge Disposition (PT): home with assist

## 2024-01-15 NOTE — PROGRESS NOTES
Neurology Note    Patient:  Starr Armenta    YOB: 1980    REFERRING PHYSICIAN:  Dr. Lares    CHIEF COMPLAINT:    Numbness, loss of balance.    HISTORY OF PRESENT ILLNESS:   The patient reports that her gait and left-sided numbness are improving.    Past Medical History:  Past Medical History:   Diagnosis Date    Disease of thyroid gland     Multiple sclerosis        Past Surgical History:  Past Surgical History:   Procedure Laterality Date    CHOLECYSTECTOMY      ENDOMETRIAL ABLATION         Social History:   Social History     Socioeconomic History    Marital status:    Tobacco Use    Smoking status: Never   Vaping Use    Vaping Use: Never used   Substance and Sexual Activity    Alcohol use: No    Drug use: No    Sexual activity: Yes     Partners: Male        Family History:   Family History   Problem Relation Age of Onset    Breast cancer Maternal Aunt        Medications Prior to Admission:    Prior to Admission medications    Medication Sig Start Date End Date Taking? Authorizing Provider   levothyroxine (SYNTHROID, LEVOTHROID) 88 MCG tablet Take 1 tablet by mouth Daily.   Yes ProviderTherese MD   omeprazole (priLOSEC) 20 MG capsule Take 1 capsule by mouth Daily. 1/10/24  Yes Eze Fortune DO   predniSONE (DELTASONE) 10 MG tablet Take 4 tablets by mouth Daily for 7 days, THEN 3 tablets Daily for 7 days, THEN 2 tablets Daily for 7 days, THEN 1 tablet Daily for 7 days. 1/11/24 2/8/24 Yes Eze Fortune DO   vitamin D (ERGOCALCIFEROL) 1.25 MG (48269 UT) capsule capsule Take 1 capsule by mouth 1 (One) Time Per Week.   Yes ProviderTherese MD       Allergies:  Patient has no known allergies.      Review of system  Review of Systems   Neurological:  Positive for numbness.   All other systems reviewed and are negative.      Vitals:    01/15/24 1145   BP: 146/84   Pulse: 79   Resp: 16   Temp: 98.2 °F (36.8 °C)   SpO2: 98%       Physical exam  Physical Exam  Cardiovascular:      Rate  and Rhythm: Normal rate and regular rhythm.   Pulmonary:      Effort: Pulmonary effort is normal.   Neurological:      Comments: Speech clear, no facial droop, moves limbs well, gait steady.           Lab Results   Component Value Date    WBC 14.47 (H) 01/13/2024    HGB 11.2 (L) 01/13/2024    HCT 33 (L) 01/13/2024    MCV 87.6 01/13/2024     01/13/2024     Lab Results   Component Value Date    GLUCOSE 112 (H) 01/13/2024    BUN 18 01/13/2024    CREATININE 1.00 01/13/2024    EGFRIFNONA 63 01/27/2020    BCR 25.7 (H) 01/13/2024    CO2 27.0 01/13/2024    CALCIUM 8.4 (L) 01/13/2024    ALBUMIN 4.2 01/13/2024    AST 48 (H) 01/13/2024    ALT 59 (H) 01/13/2024     25 Hydroxy, Vitamin D  30.0 - 100.0 ng/ml 27.7 Low      RPR Titer  Negative Pos 1:4 Abnormal        Radiological Studies:   MRI Thoracic Spine With & Without Contrast    Result Date: 1/14/2024  MRI THORACIC SPINE W WO CONTRAST Date of Exam: 1/13/2024 9:33 PM EST Indication: MS, myelitis.  Comparison: None available. Technique:  Routine multiplanar/multisequence sequence images of the thoracic spine were obtained before and after the uneventful administration of 20 mL Multihance.  Findings: The alignment is anatomic. The vertebral body heights appear normal. There are mild degenerative endplate changes in the midthoracic spine. There is disc desiccation at T3-4. There is mild scattered facet arthropathy. The spinal canal and neural foramina  are widely patent throughout. The spinal cord appears normal in signal throughout. The posterior paravertebral soft tissues are unremarkable. No pathologic enhancement or mass is identified.     Impression: 1.Spinal cord appears normal, with no evidence of demyelination. 2.Mild multilevel degenerative changes of thoracic spine as described above. Electronically Signed: Hola Eubanks MD  1/14/2024 11:06 AM EST  Workstation ID: [id number]    MRI Cervical Spine With & Without Contrast    Result Date: 1/14/2024  MRI CERVICAL  SPINE W WO CONTRAST Date of Exam: 1/13/2024 9:30 PM EST Indication: MS, myelitis.  Comparison: None available. Technique:  Routine multiplanar/multisequence sequence images of the cervical spine were obtained before and after the uneventful administration of 20 mL Multihance.  Findings: The alignment is anatomic. The craniocervical junction appears intact. The vertebral body heights appear normal. There are mild degenerative endplate changes at C5-6 and C6-7. There are mild discogenic changes at C4-5 through C6-7. The partially visualized posterior fossa contents are unremarkable. There are several patchy areas of T2 hyperintensity within the spinal cord centrally at the C2 level, posteriorly at C3-4, and on the left at C5. There is associated enhancement in the spinal cord at C3-4 compatible with active demyelination. The prevertebral soft tissues are unremarkable. C2-3: No spinal canal or neural foraminal stenosis. C3-4: Mild bilateral facet arthropathy. Mild bilateral uncovertebral hypertrophy. No spinal canal stenosis. No neural foraminal stenosis. C4-5: Moderate bilateral facet arthropathy. Mild left uncovertebral hypertrophy. No spinal canal stenosis. Mild left neural foraminal stenosis. C5-6: Mild disc osteophyte complex. Moderate bilateral facet arthropathy. Mild bilateral uncovertebral hypertrophy. No spinal canal stenosis. No neural foraminal stenosis. C6-7: Mild disc bulge. Mild bilateral facet arthropathy. Mild left uncovertebral hypertrophy. No spinal canal stenosis. Mild left neural foraminal stenosis. C7-T1: Mild disc bulge. No spinal canal stenosis. No neural foraminal stenosis.     Impression: 1.Focal areas of spinal cord signal normality as described above, compatible with history of multiple sclerosis. There is associated enhancement of the spinal cord at C3-4, compatible with active demyelination. 2.Mild degenerative changes of cervical spine as described above. Electronically Signed: Hola  MD Raimundo  1/14/2024 10:43 AM EST  Workstation ID: [id number]    XR Chest 1 View    Result Date: 1/13/2024  XR CHEST 1 VW Date of Exam: 1/13/2024 2:45 AM EST Indication: Acute Stroke Protocol (onset < 12 hrs) Comparison: Chest radiograph January 9, 2024 Findings: There are no airspace consolidations. No pleural fluid. No pneumothorax. The pulmonary vasculature appears within normal limits. The cardiac and mediastinal silhouette appear unremarkable. No acute osseous abnormality identified.     Impression: No active disease Electronically Signed: Abran Hope MD  1/13/2024 3:08 AM EST  Workstation ID: [id number]    CT Head Without Contrast    Result Date: 1/13/2024  CT HEAD WO CONTRAST Date of Exam: 1/13/2024 2:36 AM EST Indication: Transient ischemic attack (TIA) MS flare, numbness first to left side 8 days ago, then to right side for 2 days. Comparison: MRI of the brain dated January 9, 2024; CT scan head dated January 9, 2024 Technique: Axial CT images were obtained of the head without contrast administration.  Automated exposure control and iterative construction methods were used. Findings: There is no evidence of hemorrhage. There is no mass effect or midline shift. There is no extracerebral collection. Ventricles are normal in size and configuration for patient's stated age.  Posterior fossa is within normal limits. Calvarium and skull base appear intact.   Visualized sinuses show no air fluid levels. Visualized orbits are unremarkable.     Impression: No acute intracranial abnormality Electronically Signed: Abran Hope MD  1/13/2024 2:56 AM EST  Workstation ID: [id number]    MRI Brain With & Without Contrast    Result Date: 1/9/2024  EXAM:   MR Head Without and With Intravenous Contrast  EXAM DATE:   1/9/2024 4:34 PM  CLINICAL HISTORY:   MS Flare  TECHNIQUE:   Magnetic resonance images of the head/brain without and with intravenous contrast in multiple planes.  COMPARISON:   No relevant prior studies available.   FINDINGS:   BRAIN AND EXTRA-AXIAL SPACES:  Unremarkable as visualized.  No hemorrhage.  No enhancing lesions identified. There are however periventricular white matter T2 lesions consistent with MS. Not markedly different since 8/24/2023.   BONES/JOINTS:  See above.   SINUSES:  Unremarkable as visualized.  No acute sinusitis.   MASTOID AIR CELLS:  Unremarkable as visualized.  No mastoid effusion.   ORBITS:  Unremarkable as visualized.        No enhancing lesions identified. There are however periventricular white matter T2 lesions consistent with MS. Not markedly different since 8/24/2023.   This report was finalized on 1/9/2024 5:03 PM by Dr. Gaurav Connor MD.      CT Head Without Contrast    Result Date: 1/9/2024  EXAM:   CT Head Without Intravenous Contrast  EXAM DATE:   1/9/2024 11:29 AM  CLINICAL HISTORY:   Transient ischemic attack (TIA)  TECHNIQUE:   Axial computed tomography images of the head/brain without intravenous contrast.  Sagittal and coronal reformatted images were created and reviewed.  This CT exam was performed using one or more of the following dose reduction techniques:  automated exposure control, adjustment of the mA and/or kV according to patient size, and/or use of iterative reconstruction technique.  COMPARISON:   No relevant prior studies available.  FINDINGS:   BRAIN AND EXTRA-AXIAL SPACES:  Unremarkable as visualized.  No hemorrhage.  No significant white matter disease.  No edema.  No ventriculomegaly.   BONES/JOINTS:  Unremarkable as visualized.  No acute fracture.   SOFT TISSUES:  Unremarkable as visualized.   SINUSES:  Left maxillary sinus mucous retention cyst.   MASTOID AIR CELLS:  Unremarkable as visualized.  No mastoid effusion.        No acute findings in the head/brain.   This report was finalized on 1/9/2024 12:19 PM by Dr. Gaurav Connor MD.      XR Chest 1 View    Result Date: 1/9/2024  EXAM:   XR Chest, 1 View  EXAM DATE:   1/9/2024 10:53 AM  CLINICAL HISTORY:   Chest Pain  Protocol  TECHNIQUE:   Frontal view of the chest.  COMPARISON:   January 20, 2023  FINDINGS:   LUNGS AND PLEURAL SPACES:  Unremarkable as visualized.  No consolidation.  No pneumothorax.   HEART:  Unremarkable as visualized.  No cardiomegaly.   MEDIASTINUM:  Unremarkable as visualized.   BONES/JOINTS:  Unremarkable as visualized.        Unremarkable exam. No acute cardiopulmonary findings identified.   This report was finalized on 1/9/2024 12:10 PM by Dr. Gaurav Connor MD.         During this visit the following were done:  Labs Reviewed [x]    Labs Ordered [x]    Radiology Reports Reviewed [x]    Radiology Ordered []    EKG, echo, and/or stress test reviewed []    EEG results reviewed  []    EEG reviewed and interpreted per myself   []    Discussed case with neurointerventionalist or neuroradiologist []    Referring Provider Records Reviewed []    ER Records Reviewed []    Hospital Records Reviewed []    History Obtained From Family []    Radiological images view and Interpreted per myself []    Case Discussed with referring provider [x]     Decision to obtain and request outside records  []        Assessment and Plan     MS exacerbation, MRI c.spine with acute demyelinating plaque at C3-4. Positive RPR. Clinically improving. Low Vit. D.  - Continue Medrol 1g IV x total of 5 doses.  - Vitamin D supplement.  - LP in I.R, check VDRL, FTA, MS panel.  - Rituxan 1 g IV if neurosyphilis is ruled out. Discussed Rituxan with Dr. Márquez.  - Outpatient F/U with Dr. Márquez, first available.                    Electronically signed by Kevin Colón MD on 1/15/2024 at 12:53 EST

## 2024-01-15 NOTE — PLAN OF CARE
Goal Outcome Evaluation:  Plan of Care Reviewed With: patient           Outcome Evaluation: Pt completed transfer training and LB dressing with independence. She presents with decreased LUE strength, coordination, imapired targeting, altered sensation and is performing below basline status. Issued and reviewed foam block HEP and tabletop activities to address above deficits. Recommend OPOT at DC.      Anticipated Discharge Disposition (OT): home with outpatient therapy services

## 2024-01-15 NOTE — PLAN OF CARE
Patient awake, alert and oriented, states she continues to have the numbness, left hand is the worst. Plan is for LP tomorrow, patient in agreement of plan. Up ad monserrat in room.

## 2024-01-15 NOTE — THERAPY EVALUATION
Patient Name: Starr Armenta  : 1980    MRN: [id number]                              Today's Date: 1/15/2024       Admit Date: 2024    Visit Dx:     ICD-10-CM ICD-9-CM   1. Multiple sclerosis exacerbation  G35 340   2. Left sided numbness  R20.0 782.0   3. Right sided numbness  R20.0 782.0   4. Gait disturbance  R26.9 781.2     Patient Active Problem List   Diagnosis    MS (multiple sclerosis)    Multiple sclerosis exacerbation     Past Medical History:   Diagnosis Date    Disease of thyroid gland     Multiple sclerosis      Past Surgical History:   Procedure Laterality Date    CHOLECYSTECTOMY      ENDOMETRIAL ABLATION        General Information       Row Name 01/15/24 1731          OT Time and Intention    Document Type evaluation  -AR     Mode of Treatment individual therapy;occupational therapy  -AR       Row Name 01/15/24 173          General Information    Patient Profile Reviewed yes  -AR     Prior Level of Function independent:;all household mobility;community mobility;gait;transfer;ADL's;driving;shopping;cooking;home management  -AR     Existing Precautions/Restrictions other (see comments)  LUE/LLE altered sensation  -AR     Barriers to Rehab medically complex  -AR       Row Name 01/15/24 173          Living Environment    People in Home spouse;child(dmitriy), dependent;other relative(s)  mother-in-law, 11 and 11 yo children  -AR       Row Name 01/15/24 173          Home Main Entrance    Number of Stairs, Main Entrance two  -AR     Stair Railings, Main Entrance none  -AR       Row Name 01/15/24 1731          Stairs Within Home, Primary    Stairs, Within Home, Primary Bedroom on second story  -AR     Number of Stairs, Within Home, Primary twelve  -AR     Stair Railings, Within Home, Primary railing on right side (ascending)  -AR       Row Name 01/15/24 173          Cognition    Orientation Status (Cognition) oriented x 4;verbal cues/prompts needed for orientation  -AR       Row Name 01/15/24  "1731          Safety Issues, Functional Mobility    Impairments Affecting Function (Mobility) coordination;shortness of breath;visual/perceptual;sensation/sensory awareness  -AR               User Key  (r) = Recorded By, (t) = Taken By, (c) = Cosigned By      Initials Name Provider Type    Caroline Cm, HILARY Occupational Therapist                     Mobility/ADL's       Row Name 01/15/24 1738          Transfers    Transfers sit-stand transfer;stand-sit transfer  -AR       Row Name 01/15/24 1738          Sit-Stand Transfer    Sit-Stand [place] (Transfers) independent  -AR       Row Name 01/15/24 1738          Activities of Daily Living    BADL Assessment/Intervention lower body dressing;feeding  -AR       Row Name 01/15/24 1738          Lower Body Dressing Assessment/Training    [place] Level (Lower Body Dressing) don;socks;independent  -AR     Position (Lower Body Dressing) unsupported sitting  -AR       Row Name 01/15/24 1738          Self-Feeding Assessment/Training    Comment, (Feeding) Pt is R hand dominant and has been self-feeding using RUE without issue  -AR               User Key  (r) = Recorded By, (t) = Taken By, (c) = Cosigned By      Initials Name Provider Type    Caroline Cm, HILARY Occupational Therapist                   Obj/Interventions       Row Name 01/15/24 1740          Sensory Assessment (Somatosensory)    Sensory Assessment (Somatosensory) left UE  -AR     Left UE Sensory Assessment general sensation  -AR     Sensory Assessment Localizing to light touch with 100% accuracy, describes \"heaviness\" to limb  -AR       Row Name 01/15/24 1740          Vision Assessment/Intervention    Visual Impairment/Limitations WNL;corrective lenses for reading  -AR       Row Name 01/15/24 1740          Range of Motion Comprehensive    General Range of Motion no range of motion deficits identified  -AR       Row Name 01/15/24 1740          Strength Comprehensive (MMT)    General Manual " Muscle Testing (MMT) Assessment upper extremity strength deficits identified  -AR     Comment, General Manual Muscle Testing (MMT) Assessment RUE 5/5, LUE 4+/5, L hand 4/5  -AR       Row Name 01/15/24 1740          Hand (Therapeutic Exercise)    Hand (Therapeutic Exercise) strengthening exercise  -AR     Hand Strengthening (Therapeutic Exercise) bilateral;10 repetitions  issued and reviewed foam block HEP  -AR       Row Name 01/15/24 1740          Motor Skills    Motor Skills coordination;motor control/coordination interventions  -AR     Coordination finger to nose;gross motor deficit;left;minimal impairment;other (see comments)  imapired targeting  -AR     Motor Control/Coordination Interventions bilateral/bimanual training;fine motor manipulation/dexterity activities;therapeutic exercise/ROM;other (see comments)  tabletop stacking of blocks and placement of blocks in grid system  -AR     Therapeutic Exercise hand  -AR       Row Name 01/15/24 1740          Balance    Balance Assessment sitting static balance;sitting dynamic balance;standing static balance;standing dynamic balance  -AR     Static Sitting Balance independent  -AR     Dynamic Sitting Balance independent  -AR     Position, Sitting Balance unsupported;sitting in chair  -AR     Static Standing Balance independent  -AR     Dynamic Standing Balance independent  -AR     Position/Device Used, Standing Balance unsupported  -AR               User Key  (r) = Recorded By, (t) = Taken By, (c) = Cosigned By      Initials Name Provider Type    Caroline Cm, HILARY Occupational Therapist                   Goals/Plan       Row Name 01/15/24 3258          Strength Goal 1 (OT)    Strength Goal 1 (OT) Pt will complete LUE foam block HEP with modified independence  -AR     Time Frame (Strength Goal 1, OT) long term goal (LTG);by discharge  -AR     Progress/Outcome (Strength Goal 1, OT) goal ongoing  -AR       Row Name 01/15/24 1748          Problem Specific Goal 1  (OT)    Problem Specific Goal 1 (OT) Pt will complete LUE [facility] HEP with independence  -AR     Time Frame (Problem Specific Goal 1, OT) long term goal (LTG);by discharge  -AR     Progress/Outcome (Problem Specific Goal 1, OT) goal ongoing  -AR       Row Name 01/15/24 1748          Therapy Assessment/Plan (OT)    Planned Therapy Interventions (OT) activity tolerance training;cognitive/visual perception retraining;neuromuscular control/coordination retraining;occupation/activity based interventions;ROM/therapeutic exercise;strengthening exercise  -AR               User Key  (r) = Recorded By, (t) = Taken By, (c) = Cosigned By      Initials Name Provider Type    AR Caroline Ortiz, OT Occupational Therapist                   Clinical Impression       Row Name 01/15/24 1743          Pain Assessment    Pretreatment Pain Rating 0/10 - no pain  -AR     Posttreatment Pain Rating 0/10 - no pain  -AR       Row Name 01/15/24 1743          Plan of Care Review    Plan of Care Reviewed With patient  -AR     Outcome Evaluation Pt completed transfer training and LB dressing with independence. She presents with decreased LUE strength, coordination, imapired targeting, altered sensation and is performing below basline status. Issued and reviewed foam block HEP and tabletop activities to address above deficits. Recommend OPOT at [place].  -AR       Row Name 01/15/24 1743          Therapy Assessment/Plan (OT)    Rehab Potential (OT) good, to achieve stated therapy goals  -AR     Criteria for Skilled Therapeutic Interventions Met (OT) yes  -AR     Therapy Frequency (OT) 3 times/wk  -AR       Row Name 01/15/24 1743          Therapy Plan Review/Discharge Plan (OT)    Anticipated Discharge Disposition (OT) home with outpatient therapy services  -AR       Row Name 01/15/24 1743          Vital Signs    Pre Patient Position Sitting  -AR     Intra Patient Position Standing  -AR     Post Patient Position Sitting  -AR       Row Name 01/15/24 1743           Positioning and Restraints    Pre-Treatment Position sitting in chair/recliner  -AR     Post Treatment Position chair  -AR     In Chair notified nsg;sitting;call light within reach;encouraged to call for assist  -AR               User Key  (r) = Recorded By, (t) = Taken By, (c) = Cosigned By      Initials Name Provider Type    Caroline Cm OT Occupational Therapist                   Outcome Measures       Row Name 01/15/24 1750          How much help from another is currently needed...    Putting on and taking off regular lower body clothing? 4  -AR     Bathing (including washing, rinsing, and drying) 4  -AR     Toileting (which includes using toilet bed pan or urinal) 4  -AR     Putting on and taking off regular upper body clothing 4  -AR     Taking care of personal grooming (such as brushing teeth) 4  -AR     Eating meals 4  -AR     AM-PAC 6 Clicks Score (OT) 24  -AR       Row Name 01/15/24 1608 01/15/24 0830       How much help from another person do you currently need...    Turning from your back to your side while in flat bed without using bedrails? 4  -LH 4  -SJ    Moving from lying on back to sitting on the side of a flat bed without bedrails? 4  - 4  -SJ    Moving to and from a bed to a chair (including a wheelchair)? 4  - 4  -SJ    Standing up from a chair using your arms (e.g., wheelchair, bedside chair)? 4  - 4  -SJ    Climbing 3-5 steps with a railing? 3  - 4  -SJ    To walk in hospital room? 3  -LH 4  -SJ    AM-PAC 6 Clicks Score (PT) 22  - 24  -SJ    Highest Level of Mobility Goal 7 --> Walk 25 feet or more  -LH 8 --> Walked 250 feet or more  -      Row Name 01/15/24 1750 01/15/24 1608       Functional Assessment    Outcome Measure Options AM-PAC 6 Clicks Daily Activity (OT)  -AR AM-PAC 6 Clicks Basic Mobility (PT)  -              User Key  (r) = Recorded By, (t) = Taken By, (c) = Cosigned By      Initials Name Provider Type    Caroline Cm OT Occupational  Therapist    Ana Cristina Nation RN Registered Nurse    Jessenia Lara, PT Physical Therapist                    Occupational Therapy Education       Title: PT OT SLP Therapies (In Progress)       Topic: Occupational Therapy (Done)       Point: ADL training (Done)       Description:   Instruct learner(s) on proper safety adaptation and remediation techniques during self care or transfers.   Instruct in proper use of assistive devices.                  Learning Progress Summary             Patient Eager, E,TB,D,H, VU by AR at 1/15/2024 1750                         Point: Home exercise program (Done)       Description:   Instruct learner(s) on appropriate technique for monitoring, assisting and/or progressing therapeutic exercises/activities.                  Learning Progress Summary             Patient Eager, E,TB,D,H, VU by AR at 1/15/2024 1750                         Point: Precautions (Done)       Description:   Instruct learner(s) on prescribed precautions during self-care and functional transfers.                  Learning Progress Summary             Patient Eager, E,TB,D,H, VU by AR at 1/15/2024 1750                         Point: Body mechanics (Done)       Description:   Instruct learner(s) on proper positioning and spine alignment during self-care, functional mobility activities and/or exercises.                  Learning Progress Summary             Patient Eager, E,TB,D,H, VU by AR at 1/15/2024 1750                                         User Key       Initials Effective Dates Name Provider Type Discipline    AR 07/11/23 -  Caroline Ortiz OT Occupational Therapist OT                  OT Recommendation and Plan  Planned Therapy Interventions (OT): activity tolerance training, cognitive/visual perception retraining, neuromuscular control/coordination retraining, occupation/activity based interventions, ROM/therapeutic exercise, strengthening exercise  Therapy Frequency (OT): 3 times/wk  Plan of  Care Review  Plan of Care Reviewed With: patient  Outcome Evaluation: Pt completed transfer training and LB dressing with independence. She presents with decreased LUE strength, coordination, imapired targeting, altered sensation and is performing below basline status. Issued and reviewed foam block HEP and tabletop activities to address above deficits. Recommend OPOT at [place].     Time Calculation:   Evaluation Complexity (OT)  Review Occupational Profile/Medical/Therapy History Complexity: brief/low complexity  Assessment, Occupational Performance/Identification of Deficit Complexity: 1-3 performance deficits  Clinical Decision Making Complexity (OT): problem focused assessment/low complexity  Overall Complexity of Evaluation (OT): low complexity     Time Calculation- OT       Row Name 01/15/24 1751             Time Calculation- OT    OT Start Time 1531  -AR      OT Received On 01/15/24  -AR      OT Goal Re-Cert Due Date 01/25/24  -AR         Timed Charges    87783 - OT Therapeutic Exercise Minutes 13  -AR      74658 - OT Self Care/Mgmt Minutes --  -AR         Untimed Charges    OT Eval/Re-eval Minutes 59  -AR         Total Minutes    Timed Charges Total Minutes 13  -AR      Untimed Charges Total Minutes 59  -AR       Total Minutes 72  -AR                User Key  (r) = Recorded By, (t) = Taken By, (c) = Cosigned By      Initials Name Provider Type    AR Caroline Ortiz OT Occupational Therapist                  Therapy Charges for Today       Code Description Service Date Service Provider Modifiers Qty    04320701508  OT THER PROC EA 15 MIN 1/15/2024 Caroline Ortiz OT GO 1    [id number]  OT EVAL LOW COMPLEXITY 4 1/15/2024 Caroline Ortiz OT GO 1                 Caroline Ortiz OT  1/15/2024

## 2024-01-15 NOTE — PLAN OF CARE
Problem: Adult Inpatient Plan of Care  Goal: Plan of Care Review  1/15/2024 0353 by Srinath Oleary RN  Outcome: Ongoing, Progressing  1/15/2024 0351 by Srinath Oleary RN  Outcome: Ongoing, Progressing  Goal: Patient-Specific Goal (Individualized)  1/15/2024 0353 by Srinath Oleary RN  Outcome: Ongoing, Progressing  1/15/2024 0351 by Srinath Oleary RN  Outcome: Ongoing, Progressing  Goal: Absence of Hospital-Acquired Illness or Injury  1/15/2024 0353 by Srinath Oleary RN  Outcome: Ongoing, Progressing  1/15/2024 0351 by Srinath Oleary RN  Outcome: Ongoing, Progressing  Intervention: Identify and Manage Fall Risk  Recent Flowsheet Documentation  Taken 1/15/2024 0205 by Srinath Oleary RN  Safety Promotion/Fall Prevention:   activity supervised   assistive device/personal items within reach   clutter free environment maintained   fall prevention program maintained   gait belt   mobility aid in reach   nonskid shoes/slippers when out of bed   room organization consistent   safety round/check completed  Taken 1/15/2024 0010 by Srinath Oleary RN  Safety Promotion/Fall Prevention:   activity supervised   assistive device/personal items within reach   clutter free environment maintained   fall prevention program maintained   gait belt   mobility aid in reach   nonskid shoes/slippers when out of bed   room organization consistent   safety round/check completed  Taken 1/14/2024 2000 by Srinath Oleary RN  Safety Promotion/Fall Prevention:   activity supervised   assistive device/personal items within reach   clutter free environment maintained   fall prevention program maintained   gait belt   mobility aid in reach   nonskid shoes/slippers when out of bed   room organization consistent   safety round/check completed  Intervention: Prevent Skin Injury  Recent Flowsheet Documentation  Taken 1/15/2024 0205 by Srinath Oleary RN  Body Position: position changed independently  Taken 1/15/2024 0010 by Srinath Oleary  RN  Body Position: position changed independently  Taken 1/14/2024 2000 by Srinath Oleary RN  Body Position: position changed independently  Intervention: Prevent and Manage VTE (Venous Thromboembolism) Risk  Recent Flowsheet Documentation  Taken 1/15/2024 0205 by Srinath Oleary RN  VTE Prevention/Management:   bilateral   sequential compression devices on  Taken 1/15/2024 0010 by Srinath Oleary RN  VTE Prevention/Management:   bilateral   sequential compression devices on  Taken 1/14/2024 2000 by Srinath Oleary RN  VTE Prevention/Management:   bilateral   sequential compression devices on  Range of Motion: active ROM (range of motion) encouraged  Intervention: Prevent Infection  Recent Flowsheet Documentation  Taken 1/15/2024 0205 by Srinath Oleary RN  Infection Prevention:   environmental surveillance performed   hand hygiene promoted   rest/sleep promoted   single patient room provided  Taken 1/15/2024 0010 by Srinath Oleary RN  Infection Prevention:   environmental surveillance performed   hand hygiene promoted   rest/sleep promoted   single patient room provided  Taken 1/14/2024 2000 by Srinath Oleary RN  Infection Prevention:   environmental surveillance performed   hand hygiene promoted   rest/sleep promoted   single patient room provided  Goal: Optimal Comfort and Wellbeing  1/15/2024 0353 by Srinath Oleary RN  Outcome: Ongoing, Progressing  1/15/2024 0351 by Srinath Oleary RN  Outcome: Ongoing, Progressing  Intervention: Provide Person-Centered Care  Recent Flowsheet Documentation  Taken 1/14/2024 2000 by Srinath Oleary RN  Trust Relationship/Rapport: care explained  Goal: Readiness for Transition of Care  1/15/2024 0353 by Srinath Oleary RN  Outcome: Ongoing, Progressing  1/15/2024 0351 by Srinath Oleary RN  Outcome: Ongoing, Progressing     Problem: Fall Injury Risk  Goal: Absence of Fall and Fall-Related Injury  1/15/2024 0353 by Srinath Oleary RN  Outcome: Ongoing, Progressing  1/15/2024  0351 by Srinath Oleary RN  Outcome: Ongoing, Progressing  Intervention: Identify and Manage Contributors  Recent Flowsheet Documentation  Taken 1/15/2024 0205 by Srinath Oleary RN  Self-Care Promotion: independence encouraged  Taken 1/15/2024 0010 by Srinath Oleary RN  Self-Care Promotion: independence encouraged  Taken 1/14/2024 2000 by Srinath Oleary RN  Medication Review/Management: medications reviewed  Self-Care Promotion: independence encouraged  Intervention: Promote Injury-Free Environment  Recent Flowsheet Documentation  Taken 1/15/2024 0205 by Srinath Oleary RN  Safety Promotion/Fall Prevention:   activity supervised   assistive device/personal items within reach   clutter free environment maintained   fall prevention program maintained   gait belt   mobility aid in reach   nonskid shoes/slippers when out of bed   room organization consistent   safety round/check completed  Taken 1/15/2024 0010 by Srinath Oleary RN  Safety Promotion/Fall Prevention:   activity supervised   assistive device/personal items within reach   clutter free environment maintained   fall prevention program maintained   gait belt   mobility aid in reach   nonskid shoes/slippers when out of bed   room organization consistent   safety round/check completed  Taken 1/14/2024 2000 by Srinath Oleary RN  Safety Promotion/Fall Prevention:   activity supervised   assistive device/personal items within reach   clutter free environment maintained   fall prevention program maintained   gait belt   mobility aid in reach   nonskid shoes/slippers when out of bed   room organization consistent   safety round/check completed   Goal Outcome Evaluation:

## 2024-01-15 NOTE — PROGRESS NOTES
[facility] Medicine Services  PROGRESS NOTE    Patient Name: Starr Armenta  : 1980  MRN: [id number]    Date of Admission: 2024  Primary Care Physician: Alexandr Corbin PA-C    Subjective   Subjective     CC:  numbness and paresthesias    HPI:  No change in paresthesias  .  Currently distraught about RPR+.  Amenable to LP.       Objective   Objective     Vital Signs:   Temp:  [97.8 °F (36.6 °C)-98.3 °F (36.8 °C)] 98.2 °F (36.8 °C)  Heart Rate:  [55-98] 79  Resp:  [16] 16  BP: (109-149)/(64-86) 146/84     Physical Exam:  Gen:  WD/WN  Neuro: alert and oriented, clear speech, follows commands, grossly nonfocal, anxious   HEENT:  NC/AT PERRL, OP benign  Neck:  Supple, no LAD  Heart RRR no murmur, rub, or gallop  Abd:  Soft, nontender, no rebound or guarding, pos BS  Extrem:  No c/c/e      Results Reviewed:  LAB RESULTS:      Lab 24  1038 24  0639 24  0254 01/10/24  0130 24  1102   WBC  --   --  14.47* 7.13 7.38   HEMOGLOBIN  --   --  13.9 14.3 14.0   HEMOGLOBIN, POC 11.2*  --   --   --   --    HEMATOCRIT  --   --  41.6 41.5 41.9   HEMATOCRIT POC 33*  --   --   --   --    PLATELETS  --   --  321 313 283   NEUTROS ABS  --   --  9.63* 6.22 4.42   IMMATURE GRANS (ABS)  --   --  0.08* 0.04 0.03   LYMPHS ABS  --   --  3.79* 0.83 2.17   MONOS ABS  --   --  0.94* 0.02* 0.51   EOS ABS  --   --  0.01 0.00 0.15   MCV  --   --  87.6 84.7 86.2   CRP  --  <0.30  --   --   --          Lab 24  1038 24  0254 01/10/24  0130 24  1305 24  1102   SODIUM  --  141 139  --  139   POTASSIUM  --  3.7 3.8  --  4.0   CHLORIDE  --  104 103  --  103   CO2  --  27.0 21.4*  --  25.0   ANION GAP  --  10.0 14.6  --  11.0   BUN  --  18 10  --  9   CREATININE 1.00 0.70 1.02*  --  0.93   EGFR 70.9 108.8 69.3  --  77.4   GLUCOSE  --  112* 277*  --  95   CALCIUM  --  8.4* 9.1  --  9.5   TSH  --   --   --  5.780*  --          Lab 24  0254 01/10/24  0132  01/09/24  1102   TOTAL PROTEIN 7.6 7.8 7.7   ALBUMIN 4.2 4.3 4.4   GLOBULIN 3.4 3.5 3.3   ALT (SGPT) 59* 27 20   AST (SGOT) 48* 36* 33*   BILIRUBIN 0.7 0.5 0.7   ALK PHOS 68 92 81         Lab 01/13/24  0639 01/13/24  0254 01/09/24  1305 01/09/24  1102   HSTROP T 8 6 <6 <6             Lab 01/13/24  0254   FOLATE 12.10   VITAMIN B 12 440         Brief Urine Lab Results  (Last result in the past 365 days)        Color   Clarity   Blood   Leuk Est   Nitrite   Protein   CREAT   Urine HCG        01/13/24 0702 Yellow   Clear   Negative   Negative   Negative   30 mg/dL (1+)                   Microbiology Results Abnormal       None            MRI Thoracic Spine With & Without Contrast    Result Date: 1/14/2024  MRI THORACIC SPINE W WO CONTRAST Date of Exam: 1/13/2024 9:33 PM EST Indication: MS, myelitis.  Comparison: None available. Technique:  Routine multiplanar/multisequence sequence images of the thoracic spine were obtained before and after the uneventful administration of 20 mL Multihance.  Findings: The alignment is anatomic. The vertebral body heights appear normal. There are mild degenerative endplate changes in the midthoracic spine. There is disc desiccation at T3-4. There is mild scattered facet arthropathy. The spinal canal and neural foramina  are widely patent throughout. The spinal cord appears normal in signal throughout. The posterior paravertebral soft tissues are unremarkable. No pathologic enhancement or mass is identified.     Impression: Impression: 1.Spinal cord appears normal, with no evidence of demyelination. 2.Mild multilevel degenerative changes of thoracic spine as described above. Electronically Signed: Hola Eubanks MD  1/14/2024 11:06 AM EST  Workstation ID: [id number]    MRI Cervical Spine With & Without Contrast    Result Date: 1/14/2024  MRI CERVICAL SPINE W WO CONTRAST Date of Exam: 1/13/2024 9:30 PM EST Indication: MS, myelitis.  Comparison: None available. Technique:  Routine  multiplanar/multisequence sequence images of the cervical spine were obtained before and after the uneventful administration of 20 mL Multihance.  Findings: The alignment is anatomic. The craniocervical junction appears intact. The vertebral body heights appear normal. There are mild degenerative endplate changes at C5-6 and C6-7. There are mild discogenic changes at C4-5 through C6-7. The partially visualized posterior fossa contents are unremarkable. There are several patchy areas of T2 hyperintensity within the spinal cord centrally at the C2 level, posteriorly at C3-4, and on the left at C5. There is associated enhancement in the spinal cord at C3-4 compatible with active demyelination. The prevertebral soft tissues are unremarkable. C2-3: No spinal canal or neural foraminal stenosis. C3-4: Mild bilateral facet arthropathy. Mild bilateral uncovertebral hypertrophy. No spinal canal stenosis. No neural foraminal stenosis. C4-5: Moderate bilateral facet arthropathy. Mild left uncovertebral hypertrophy. No spinal canal stenosis. Mild left neural foraminal stenosis. C5-6: Mild disc osteophyte complex. Moderate bilateral facet arthropathy. Mild bilateral uncovertebral hypertrophy. No spinal canal stenosis. No neural foraminal stenosis. C6-7: Mild disc bulge. Mild bilateral facet arthropathy. Mild left uncovertebral hypertrophy. No spinal canal stenosis. Mild left neural foraminal stenosis. C7-T1: Mild disc bulge. No spinal canal stenosis. No neural foraminal stenosis.     Impression: Impression: 1.Focal areas of spinal cord signal normality as described above, compatible with history of multiple sclerosis. There is associated enhancement of the spinal cord at C3-4, compatible with active demyelination. 2.Mild degenerative changes of cervical spine as described above. Electronically Signed: Hola Eubanks MD  1/14/2024 10:43 AM EST  Workstation ID: [id number]         Current medications:  Scheduled  Meds:cholecalciferol, 1,000 Units, Oral, Daily  gadobenate dimeglumine, 20 mL, Intravenous, Once in imaging  levothyroxine, 88 mcg, Oral, Q AM  methylPREDNISolone sodium succinate, 1,000 mg, Intravenous, Daily  pantoprazole, 40 mg, Oral, Q AM  senna-docusate sodium, 2 tablet, Oral, BID  sodium chloride, 10 mL, Intravenous, Q12H      Continuous Infusions:   PRN Meds:.  acetaminophen    senna-docusate sodium **AND** polyethylene glycol **AND** bisacodyl **AND** bisacodyl    sodium chloride    sodium chloride    sodium chloride    Assessment & Plan   Assessment & Plan     Active Hospital Problems    Diagnosis  POA    **Multiple sclerosis exacerbation [G35]  Yes      Resolved Hospital Problems   No resolved problems to display.        Brief Hospital Course to date:  Starr Armenta is a 43 y.o. female with MS diagnosed in [place] 20 yrs ago, now with flare which is persisting / worsening despite high-dose steroids given Jan 9-10 at [facility].  Returns to ED due to worsening symptoms     MS presumed flare  - Neurology consult appreciated   -  C and T  cord MRI w read pending.  consider LP.    -  needs to establish care with outpt clinic  - steroids     +RPR:  checking VDRL on CSF      Hypothyroid, fatty liver       Expected Discharge Location and Transportation: home vs snf  Expected Discharge   Expected Discharge Date: 1/17/2024; Expected Discharge Time:      DVT prophylaxis:  Mechanical DVT prophylaxis orders are present.     AM-PAC 6 Clicks Score (PT): 24 (01/15/24 0830)    CODE STATUS:   Code Status and Medical Interventions:   Ordered at: 01/13/24 0821     Level Of Support Discussed With:    Patient     Code Status (Patient has no pulse and is not breathing):    CPR (Attempt to Resuscitate)     Medical Interventions (Patient has pulse or is breathing):    Full Support       Gail Lares MD  01/15/24

## 2024-01-15 NOTE — TELEPHONE ENCOUNTER
MATM letting patient know that Dr. Márquez wanted me to get her scheduled with a visit this week. Asked her to call back at [phone].

## 2024-01-16 ENCOUNTER — APPOINTMENT (OUTPATIENT)
Dept: GENERAL RADIOLOGY | Facility: HOSPITAL | Age: 44
End: 2024-01-16
Payer: COMMERCIAL

## 2024-01-16 PROBLEM — G35 MULTIPLE SCLEROSIS: Status: ACTIVE | Noted: 2024-01-16

## 2024-01-16 LAB
APPEARANCE CSF: CLEAR
APPEARANCE CSF: CLEAR
COLOR CSF: COLORLESS
COLOR CSF: COLORLESS
COLOR SPUN CSF: COLORLESS
COLOR SPUN CSF: COLORLESS
GLUCOSE CSF-MCNC: 105 MG/DL (ref 40–70)
MOG AB SER QL CBA IFA: NEGATIVE
PROT CSF-MCNC: 19.6 MG/DL (ref 15–45)
RBC # CSF MANUAL: 1 /MM3 (ref 0–5)
RBC # CSF MANUAL: 20 /MM3 (ref 0–5)
SPECIMEN VOL CSF: 8 ML
SPECIMEN VOL CSF: 8 ML
TREPONEMA PALLIDUM IGG+IGM AB [PRESENCE] IN SERUM OR PLASMA BY IMMUNOASSAY: NON REACTIVE
TUBE # CSF: 1
TUBE # CSF: 4
WBC # CSF MANUAL: 0 /MM3 (ref 0–5)
WBC # CSF MANUAL: 3 /MM3 (ref 0–5)

## 2024-01-16 PROCEDURE — 99233 SBSQ HOSP IP/OBS HIGH 50: CPT | Performed by: STUDENT IN AN ORGANIZED HEALTH CARE EDUCATION/TRAINING PROGRAM

## 2024-01-16 PROCEDURE — 83873 ASSAY OF CSF PROTEIN: CPT | Performed by: PSYCHIATRY & NEUROLOGY

## 2024-01-16 PROCEDURE — 82945 GLUCOSE OTHER FLUID: CPT | Performed by: PSYCHIATRY & NEUROLOGY

## 2024-01-16 PROCEDURE — 009U3ZX DRAINAGE OF SPINAL CANAL, PERCUTANEOUS APPROACH, DIAGNOSTIC: ICD-10-PCS | Performed by: STUDENT IN AN ORGANIZED HEALTH CARE EDUCATION/TRAINING PROGRAM

## 2024-01-16 PROCEDURE — 82040 ASSAY OF SERUM ALBUMIN: CPT | Performed by: PSYCHIATRY & NEUROLOGY

## 2024-01-16 PROCEDURE — 82042 OTHER SOURCE ALBUMIN QUAN EA: CPT | Performed by: PSYCHIATRY & NEUROLOGY

## 2024-01-16 PROCEDURE — 25010000002 METHYLPREDNISOLONE PER 125 MG

## 2024-01-16 PROCEDURE — 89050 BODY FLUID CELL COUNT: CPT | Performed by: PSYCHIATRY & NEUROLOGY

## 2024-01-16 PROCEDURE — 82784 ASSAY IGA/IGD/IGG/IGM EACH: CPT | Performed by: PSYCHIATRY & NEUROLOGY

## 2024-01-16 PROCEDURE — B01B1ZZ FLUOROSCOPY OF SPINAL CORD USING LOW OSMOLAR CONTRAST: ICD-10-PCS | Performed by: STUDENT IN AN ORGANIZED HEALTH CARE EDUCATION/TRAINING PROGRAM

## 2024-01-16 PROCEDURE — 99232 SBSQ HOSP IP/OBS MODERATE 35: CPT | Performed by: INTERNAL MEDICINE

## 2024-01-16 PROCEDURE — 86780 TREPONEMA PALLIDUM: CPT | Performed by: PSYCHIATRY & NEUROLOGY

## 2024-01-16 PROCEDURE — 84157 ASSAY OF PROTEIN OTHER: CPT | Performed by: PSYCHIATRY & NEUROLOGY

## 2024-01-16 PROCEDURE — 25010000002 LIDOCAINE 1 % SOLUTION: Performed by: PHYSICIAN ASSISTANT

## 2024-01-16 PROCEDURE — 83916 OLIGOCLONAL BANDS: CPT | Performed by: PSYCHIATRY & NEUROLOGY

## 2024-01-16 PROCEDURE — 86592 SYPHILIS TEST NON-TREP QUAL: CPT | Performed by: PSYCHIATRY & NEUROLOGY

## 2024-01-16 RX ORDER — LIDOCAINE HYDROCHLORIDE 10 MG/ML
5 INJECTION, SOLUTION INFILTRATION; PERINEURAL ONCE
Status: COMPLETED | OUTPATIENT
Start: 2024-01-16 | End: 2024-01-16

## 2024-01-16 RX ADMIN — LIDOCAINE HYDROCHLORIDE 5 ML: 10 INJECTION, SOLUTION INFILTRATION; PERINEURAL at 08:57

## 2024-01-16 RX ADMIN — Medication 10 ML: at 20:17

## 2024-01-16 RX ADMIN — PANTOPRAZOLE SODIUM 40 MG: 40 TABLET, DELAYED RELEASE ORAL at 05:33

## 2024-01-16 RX ADMIN — SODIUM CHLORIDE 1000 MG: 900 INJECTION INTRAVENOUS at 09:12

## 2024-01-16 RX ADMIN — Medication 10 ML: at 09:11

## 2024-01-16 RX ADMIN — Medication 1000 UNITS: at 09:11

## 2024-01-16 RX ADMIN — LEVOTHYROXINE SODIUM 88 MCG: 0.09 TABLET ORAL at 05:33

## 2024-01-16 RX ADMIN — SENNOSIDES AND DOCUSATE SODIUM 2 TABLET: 8.6; 5 TABLET ORAL at 20:17

## 2024-01-16 NOTE — PROGRESS NOTES
[facility] Neurology    Progress Note    Patient Name: Starr Armenta  : 1980  MRN: [id number]  Primary Care Physician:  Alexandr Corbin PA-C  Date of admission: 2024    Subjective     Reason for consult: Numbness    History of Present Illness   Ongoing tingling, tightness in her left hand.  She feels a tightness around her chest and abdomen.    Review of Systems   General: Negative for fever, nausea, or vomiting.   Neurological: Negative for headache, pain, or weakness.     Objective     Vitals:   Temp:  [97.8 °F (36.6 °C)-99.8 °F (37.7 °C)] 99.8 °F (37.7 °C)  Heart Rate:  [50-94] 67  Resp:  [16-18] 18  BP: (109-146)/(60-88) 135/76    Neurologic Exam   Mental status/Cognition: alert; appropriate  Speech/language: fluent; comprehension intact    Cranial nerves: EOMI. PERRL. Face appears symmetric. No dysarthria.  Tongue protrudes midline    Motor: No drift in any extremity.  Handgrip 5/5 bilaterally.  Finger flexors 5/5 on the right, 4/5 on the left.    Sensation: Diminished to light touch in the left upper extremity    Reflexes: 2+ at bilateral biceps, brachioradialis, patellas, achilles.  No clonus.  Toes equivocal.     Coordination/Complex Motor: Mild dysmetria on finger-to-nose on the left        Current Medications    Current Facility-Administered Medications:     acetaminophen (TYLENOL) tablet 650 mg, 650 mg, Oral, Q4H PRN, Gail Lares MD    sennosides-docusate (PERICOLACE) 8.6-50 MG per tablet 2 tablet, 2 tablet, Oral, BID, 2 tablet at 24 2133 **AND** polyethylene glycol (MIRALAX) packet 17 g, 17 g, Oral, Daily PRN **AND** bisacodyl (DULCOLAX) EC tablet 5 mg, 5 mg, Oral, Daily PRN **AND** bisacodyl (DULCOLAX) suppository 10 mg, 10 mg, Rectal, Daily PRN, Gail Lares MD    cholecalciferol (VITAMIN D3) tablet 1,000 Units, 1,000 Units, Oral, Daily, Tikhtman, Kevin J, MD, 1,000 Units at 24 0911    gadobenate dimeglumine (MULTIHANCE) injection 20 mL, 20 mL, Intravenous,  "Once in imaging, Gail Lares MD    levothyroxine (SYNTHROID, LEVOTHROID) tablet 88 mcg, 88 mcg, Oral, Q AM, Gail Lares MD, 88 mcg at 01/16/24 0533    pantoprazole (PROTONIX) EC tablet 40 mg, 40 mg, Oral, Q AM, Gail Lares MD, 40 mg at 01/16/24 0533    sodium chloride 0.9 % flush 10 mL, 10 mL, Intravenous, PRN, Em Cox MD    sodium chloride 0.9 % flush 10 mL, 10 mL, Intravenous, Q12H, Gail Lares MD, 10 mL at 01/16/24 0911    sodium chloride 0.9 % flush 10 mL, 10 mL, Intravenous, PRN, Gail Lares MD    sodium chloride 0.9 % infusion 40 mL, 40 mL, Intravenous, PRN, Gail Lares MD    Laboratory Results:   Lab Results   Component Value Date    GLUCOSE 112 (H) 01/13/2024    CALCIUM 8.4 (L) 01/13/2024     01/13/2024    K 3.7 01/13/2024    CO2 27.0 01/13/2024     01/13/2024    BUN 18 01/13/2024    CREATININE 1.00 01/13/2024    EGFRIFNONA 63 01/27/2020    BCR 25.7 (H) 01/13/2024    ANIONGAP 10.0 01/13/2024     Lab Results   Component Value Date    WBC 14.47 (H) 01/13/2024    HGB 11.2 (L) 01/13/2024    HCT 33 (L) 01/13/2024    MCV 87.6 01/13/2024     01/13/2024     Lab Results   Component Value Date    CHOL 157 01/14/2020     Lab Results   Component Value Date    HDL 38 (L) 01/14/2020     Lab Results   Component Value Date    LDL 95 01/14/2020     Lab Results   Component Value Date    TRIG 121 01/14/2020     No results found for: \"HGBA1C\"  No results found for: \"INR\", \"PROTIME\"  Lab Results   Component Value Date    FOLATE 12.10 01/13/2024     Lab Results   Component Value Date    EDLZDBNX50 440 01/13/2024     HIV negative  RPR +1:4  Treponema antibodies nonreactive      VDRL CSF pending  FTA CSF pending  MS profile pending    MOG negative  NMO pending   Lyme disease negative    Images/Procedures   MRI brain with and without contrast 1/9/2024  No enhancing lesions noted.  There are periventricular white matter T2 lesions consistent with MS.  Not markedly different since " 8/24/2023    MRI cervical spine with and without contrast 1/13/2024  Focal areas of spinal cord signal abnormality at C2 level, posterior at C3-C4 and left at C5.  Associated contrast-enhancement of C3-4 compatible with active demyelination.    MRI thoracic spine with and without contrast 1/13/2024  Mild multilevel degenerative changes of the thoracic spine.  No evidence of demyelination    Assessment / Plan   Active Hospital Problems:  MS flare    Brief Patient Summary:  Starr Armenta is a 43 y.o. female with history of MS presenting with full body numbness, tightness of the left hand.  MRI of the cervical spine showed focal area of active demyelination at C3-C4.  MRI brain showed no active demyelination, only chronic periventricular changes.  CSF studies show no current inflammatory or infectious changes.  She has been treated with at least 5 days of IV methylprednisolone with only mild improvement in her symptoms.    RPR initially returned positive however confirmatory treponemal antibody test was negative.  VDRL is pending.  Would consider starting rituximab prior to discharge if patient agreeable.    Plan:   -Completed IV methylprednisolone x 5 days  -Will plan for rituximab if patient agreeable and VDRL is negative  -Follow-up with outpatient neurology, Dr. Márquez on 1/19/2024    I have discussed the above with the patient, hospitalist  Time spent with patient: 45 minutes in face-to-face evaluation and management of the patient.      Luan Silva DO, MS

## 2024-01-16 NOTE — PROGRESS NOTES
[facility] Medicine Services  PROGRESS NOTE    Patient Name: Starr Armenta  : 1980  MRN: [id number]    Date of Admission: 2024  Primary Care Physician: Alexandr Corbin PA-C    Subjective   Subjective     CC:  numbness and paresthesias    HPI: feels about the same, just back from LP       Objective   Objective     Vital Signs:   Temp:  [97.8 °F (36.6 °C)-98.4 °F (36.9 °C)] 98.1 °F (36.7 °C)  Heart Rate:  [50-94] 50  Resp:  [16-18] 18  BP: (109-146)/(60-88) 124/74     Physical Exam:  Gen:  WD/WN  Neuro: alert and oriented, clear speech, follows commands, grossly nonfocal, anxious   HEENT:  NC/AT PERRL, OP benign  Neck:  Supple, no LAD  Heart RRR no murmur, rub, or gallop  Abd:  Soft, nontender, no rebound or guarding, pos BS  Extrem:  No c/c/e      Results Reviewed:  LAB RESULTS:      Lab 24  1038 24  0639 24  0254 01/10/24  0130 24  1102   WBC  --   --  14.47* 7.13 7.38   HEMOGLOBIN  --   --  13.9 14.3 14.0   HEMOGLOBIN, POC 11.2*  --   --   --   --    HEMATOCRIT  --   --  41.6 41.5 41.9   HEMATOCRIT POC 33*  --   --   --   --    PLATELETS  --   --  321 313 283   NEUTROS ABS  --   --  9.63* 6.22 4.42   IMMATURE GRANS (ABS)  --   --  0.08* 0.04 0.03   LYMPHS ABS  --   --  3.79* 0.83 2.17   MONOS ABS  --   --  0.94* 0.02* 0.51   EOS ABS  --   --  0.01 0.00 0.15   MCV  --   --  87.6 84.7 86.2   CRP  --  <0.30  --   --   --          Lab 24  1038 24  0254 01/10/24  0130 24  1305 24  1102   SODIUM  --  141 139  --  139   POTASSIUM  --  3.7 3.8  --  4.0   CHLORIDE  --  104 103  --  103   CO2  --  27.0 21.4*  --  25.0   ANION GAP  --  10.0 14.6  --  11.0   BUN  --  18 10  --  9   CREATININE 1.00 0.70 1.02*  --  0.93   EGFR 70.9 108.8 69.3  --  77.4   GLUCOSE  --  112* 277*  --  95   CALCIUM  --  8.4* 9.1  --  9.5   TSH  --   --   --  5.780*  --          Lab 24  0254 01/10/24  0130 24  1102   TOTAL PROTEIN 7.6 7.8 7.7    ALBUMIN 4.2 4.3 4.4   GLOBULIN 3.4 3.5 3.3   ALT (SGPT) 59* 27 20   AST (SGOT) 48* 36* 33*   BILIRUBIN 0.7 0.5 0.7   ALK PHOS 68 92 81         Lab 01/13/24  0639 01/13/24  0254 01/09/24  1305 01/09/24  1102   HSTROP T 8 6 <6 <6             Lab 01/13/24  0254   FOLATE 12.10   VITAMIN B 12 440         Brief Urine Lab Results  (Last result in the past 365 days)        Color   Clarity   Blood   Leuk Est   Nitrite   Protein   CREAT   Urine HCG        01/13/24 0702 Yellow   Clear   Negative   Negative   Negative   30 mg/dL (1+)                   Microbiology Results Abnormal       None            No radiology results from the last 24 hrs        Current medications:  Scheduled Meds:hold, 1 each, Does not apply, BID  cholecalciferol, 1,000 Units, Oral, Daily  gadobenate dimeglumine, 20 mL, Intravenous, Once in imaging  levothyroxine, 88 mcg, Oral, Q AM  methylPREDNISolone sodium succinate, 1,000 mg, Intravenous, Daily  pantoprazole, 40 mg, Oral, Q AM  senna-docusate sodium, 2 tablet, Oral, BID  sodium chloride, 10 mL, Intravenous, Q12H      Continuous Infusions:   PRN Meds:.  acetaminophen    senna-docusate sodium **AND** polyethylene glycol **AND** bisacodyl **AND** bisacodyl    sodium chloride    sodium chloride    sodium chloride    Assessment & Plan   Assessment & Plan     Active Hospital Problems    Diagnosis  POA    **Multiple sclerosis exacerbation [G35]  Yes    Multiple sclerosis [G35]  Yes      Resolved Hospital Problems   No resolved problems to display.        Brief Hospital Course to date:  Starr Armenta is a 43 y.o. female with MS diagnosed in [place] 20 yrs ago, now with flare which is persisting / worsening despite high-dose steroids given Jan 9-10 at [facility].  Returns to ED due to worsening symptoms     MS presumed flare  - Neurology consult appreciated   -  C and T  cord MRI w read pending.  consider LP.    -  needs to establish care with outpt clinic  - steroids     +RPR:  checking VDRL on CSF       Hypothyroid, fatty liver       Expected Discharge Location and Transportation: home vs snf  Expected Discharge   Expected Discharge Date: 1/17/2024; Expected Discharge Time:      DVT prophylaxis:  Mechanical DVT prophylaxis orders are present.     AM-PAC 6 Clicks Score (PT): 24 (01/16/24 0800)    CODE STATUS:   Code Status and Medical Interventions:   Ordered at: 01/13/24 0821     Level Of Support Discussed With:    Patient     Code Status (Patient has no pulse and is not breathing):    CPR (Attempt to Resuscitate)     Medical Interventions (Patient has pulse or is breathing):    Full Support       Gail Lares MD  01/16/24

## 2024-01-17 LAB
ALBUMIN SERPL-MCNC: 3.7 G/DL (ref 3.5–5.2)
ALBUMIN/GLOB SERPL: 1.4 G/DL
ALP SERPL-CCNC: 78 U/L (ref 39–117)
ALT SERPL W P-5'-P-CCNC: 45 U/L (ref 1–33)
ANION GAP SERPL CALCULATED.3IONS-SCNC: 10 MMOL/L (ref 5–15)
AQP4 H2O CHANNEL IGG SERPL IA-ACNC: <1.5 U/ML (ref 0–3)
AST SERPL-CCNC: 24 U/L (ref 1–32)
BASOPHILS # BLD AUTO: 0.04 10*3/MM3 (ref 0–0.2)
BASOPHILS NFR BLD AUTO: 0.2 % (ref 0–1.5)
BILIRUB SERPL-MCNC: 0.5 MG/DL (ref 0–1.2)
BUN SERPL-MCNC: 23 MG/DL (ref 6–20)
BUN/CREAT SERPL: 31.5 (ref 7–25)
CALCIUM SPEC-SCNC: 8.2 MG/DL (ref 8.6–10.5)
CHLORIDE SERPL-SCNC: 105 MMOL/L (ref 98–107)
CO2 SERPL-SCNC: 25 MMOL/L (ref 22–29)
CREAT SERPL-MCNC: 0.73 MG/DL (ref 0.57–1)
DEPRECATED RDW RBC AUTO: 38 FL (ref 37–54)
EGFRCR SERPLBLD CKD-EPI 2021: 103.5 ML/MIN/1.73
EOSINOPHIL # BLD AUTO: 0 10*3/MM3 (ref 0–0.4)
EOSINOPHIL NFR BLD AUTO: 0 % (ref 0.3–6.2)
ERYTHROCYTE [DISTWIDTH] IN BLOOD BY AUTOMATED COUNT: 12.4 % (ref 12.3–15.4)
GLOBULIN UR ELPH-MCNC: 2.7 GM/DL
GLUCOSE SERPL-MCNC: 132 MG/DL (ref 65–99)
HBV CORE IGM SERPL QL IA: NORMAL
HBV SURFACE AG SERPL QL IA: NORMAL
HCT VFR BLD AUTO: 39.9 % (ref 34–46.6)
HGB BLD-MCNC: 13.9 G/DL (ref 12–15.9)
IMM GRANULOCYTES # BLD AUTO: 0.72 10*3/MM3 (ref 0–0.05)
IMM GRANULOCYTES NFR BLD AUTO: 3.8 % (ref 0–0.5)
LYMPHOCYTES # BLD AUTO: 2.83 10*3/MM3 (ref 0.7–3.1)
LYMPHOCYTES NFR BLD AUTO: 14.9 % (ref 19.6–45.3)
MCH RBC QN AUTO: 29.5 PG (ref 26.6–33)
MCHC RBC AUTO-ENTMCNC: 34.8 G/DL (ref 31.5–35.7)
MCV RBC AUTO: 84.7 FL (ref 79–97)
MONOCYTES # BLD AUTO: 1.54 10*3/MM3 (ref 0.1–0.9)
MONOCYTES NFR BLD AUTO: 8.1 % (ref 5–12)
NEUTROPHILS NFR BLD AUTO: 13.88 10*3/MM3 (ref 1.7–7)
NEUTROPHILS NFR BLD AUTO: 73 % (ref 42.7–76)
NRBC BLD AUTO-RTO: 0 /100 WBC (ref 0–0.2)
PLATELET # BLD AUTO: 341 10*3/MM3 (ref 140–450)
PMV BLD AUTO: 9.7 FL (ref 6–12)
POTASSIUM SERPL-SCNC: 3.7 MMOL/L (ref 3.5–5.2)
PROT SERPL-MCNC: 6.4 G/DL (ref 6–8.5)
RBC # BLD AUTO: 4.71 10*6/MM3 (ref 3.77–5.28)
REAGIN AB CSF QL: NON REACTIVE
SODIUM SERPL-SCNC: 140 MMOL/L (ref 136–145)
WBC NRBC COR # BLD AUTO: 19.01 10*3/MM3 (ref 3.4–10.8)

## 2024-01-17 PROCEDURE — 97110 THERAPEUTIC EXERCISES: CPT

## 2024-01-17 PROCEDURE — 25810000003 SODIUM CHLORIDE 0.9 % SOLUTION: Performed by: STUDENT IN AN ORGANIZED HEALTH CARE EDUCATION/TRAINING PROGRAM

## 2024-01-17 PROCEDURE — 3E0430M INTRODUCTION OF MONOCLONAL ANTIBODY INTO CENTRAL VEIN, PERCUTANEOUS APPROACH: ICD-10-PCS | Performed by: STUDENT IN AN ORGANIZED HEALTH CARE EDUCATION/TRAINING PROGRAM

## 2024-01-17 PROCEDURE — 25010000002 DIPHENHYDRAMINE PER 50 MG: Performed by: STUDENT IN AN ORGANIZED HEALTH CARE EDUCATION/TRAINING PROGRAM

## 2024-01-17 PROCEDURE — 85025 COMPLETE CBC W/AUTO DIFF WBC: CPT | Performed by: STUDENT IN AN ORGANIZED HEALTH CARE EDUCATION/TRAINING PROGRAM

## 2024-01-17 PROCEDURE — 80053 COMPREHEN METABOLIC PANEL: CPT | Performed by: STUDENT IN AN ORGANIZED HEALTH CARE EDUCATION/TRAINING PROGRAM

## 2024-01-17 PROCEDURE — 86706 HEP B SURFACE ANTIBODY: CPT | Performed by: STUDENT IN AN ORGANIZED HEALTH CARE EDUCATION/TRAINING PROGRAM

## 2024-01-17 PROCEDURE — 99233 SBSQ HOSP IP/OBS HIGH 50: CPT | Performed by: STUDENT IN AN ORGANIZED HEALTH CARE EDUCATION/TRAINING PROGRAM

## 2024-01-17 PROCEDURE — 25010000002 RITUXIMAB-ARRX 500 MG/50ML SOLUTION 50 ML VIAL: Performed by: STUDENT IN AN ORGANIZED HEALTH CARE EDUCATION/TRAINING PROGRAM

## 2024-01-17 PROCEDURE — 86705 HEP B CORE ANTIBODY IGM: CPT | Performed by: STUDENT IN AN ORGANIZED HEALTH CARE EDUCATION/TRAINING PROGRAM

## 2024-01-17 PROCEDURE — 25810000003 SODIUM CHLORIDE 0.9 % SOLUTION 250 ML FLEX CONT: Performed by: STUDENT IN AN ORGANIZED HEALTH CARE EDUCATION/TRAINING PROGRAM

## 2024-01-17 PROCEDURE — 87340 HEPATITIS B SURFACE AG IA: CPT | Performed by: STUDENT IN AN ORGANIZED HEALTH CARE EDUCATION/TRAINING PROGRAM

## 2024-01-17 PROCEDURE — 99232 SBSQ HOSP IP/OBS MODERATE 35: CPT | Performed by: INTERNAL MEDICINE

## 2024-01-17 RX ORDER — FAMOTIDINE 10 MG/ML
20 INJECTION, SOLUTION INTRAVENOUS AS NEEDED
Status: DISCONTINUED | OUTPATIENT
Start: 2024-01-17 | End: 2024-01-18 | Stop reason: HOSPADM

## 2024-01-17 RX ORDER — MELATONIN
1000 DAILY
Qty: 30 TABLET | Refills: 11 | Status: SHIPPED | OUTPATIENT
Start: 2024-01-18

## 2024-01-17 RX ORDER — DIPHENHYDRAMINE HYDROCHLORIDE 50 MG/ML
50 INJECTION INTRAMUSCULAR; INTRAVENOUS ONCE
Qty: 1 ML | Refills: 0 | Status: COMPLETED | OUTPATIENT
Start: 2024-01-17 | End: 2024-01-17

## 2024-01-17 RX ORDER — DIPHENHYDRAMINE HYDROCHLORIDE 50 MG/ML
50 INJECTION INTRAMUSCULAR; INTRAVENOUS AS NEEDED
Status: DISCONTINUED | OUTPATIENT
Start: 2024-01-17 | End: 2024-01-18 | Stop reason: HOSPADM

## 2024-01-17 RX ORDER — ACETAMINOPHEN 325 MG/1
650 TABLET ORAL ONCE
Qty: 2 TABLET | Refills: 0 | Status: COMPLETED | OUTPATIENT
Start: 2024-01-17 | End: 2024-01-17

## 2024-01-17 RX ORDER — SODIUM CHLORIDE 9 MG/ML
50 INJECTION, SOLUTION INTRAVENOUS ONCE
Qty: 1000 ML | Refills: 0 | Status: COMPLETED | OUTPATIENT
Start: 2024-01-17 | End: 2024-01-17

## 2024-01-17 RX ORDER — MEPERIDINE HYDROCHLORIDE 25 MG/ML
25 INJECTION INTRAMUSCULAR; INTRAVENOUS; SUBCUTANEOUS
Status: ACTIVE | OUTPATIENT
Start: 2024-01-17 | End: 2024-01-18

## 2024-01-17 RX ADMIN — DIPHENHYDRAMINE HYDROCHLORIDE 50 MG: 50 INJECTION INTRAMUSCULAR; INTRAVENOUS at 18:57

## 2024-01-17 RX ADMIN — ACETAMINOPHEN 650 MG: 325 TABLET ORAL at 18:57

## 2024-01-17 RX ADMIN — SODIUM CHLORIDE 50 ML/HR: 9 INJECTION, SOLUTION INTRAVENOUS at 18:56

## 2024-01-17 RX ADMIN — LEVOTHYROXINE SODIUM 88 MCG: 0.09 TABLET ORAL at 05:25

## 2024-01-17 RX ADMIN — Medication 10 ML: at 08:23

## 2024-01-17 RX ADMIN — PANTOPRAZOLE SODIUM 40 MG: 40 TABLET, DELAYED RELEASE ORAL at 05:25

## 2024-01-17 RX ADMIN — RITUXIMAB-ARRX 1000 MG: 500 INJECTION, SOLUTION INTRAVENOUS at 19:32

## 2024-01-17 RX ADMIN — Medication 1000 UNITS: at 08:23

## 2024-01-17 NOTE — THERAPY TREATMENT NOTE
Patient Name: Starr Armenta  : 1980    MRN: [id number]                              Today's Date: 2024       Admit Date: 2024    Visit Dx:     ICD-10-CM ICD-9-CM   1. Multiple sclerosis exacerbation  G35 340   2. Left sided numbness  R20.0 782.0   3. Right sided numbness  R20.0 782.0   4. Gait disturbance  R26.9 781.2     Patient Active Problem List   Diagnosis    MS (multiple sclerosis)    Multiple sclerosis exacerbation    Multiple sclerosis     Past Medical History:   Diagnosis Date    Disease of thyroid gland     Multiple sclerosis      Past Surgical History:   Procedure Laterality Date    CHOLECYSTECTOMY      ENDOMETRIAL ABLATION        General Information       Row Name 24 1510          Physical Therapy Time and Intention    Document Type therapy note (daily note)  -SS     Mode of Treatment physical therapy  -SS       Row Name 24 1510          General Information    Patient Profile Reviewed yes  -SS     Existing Precautions/Restrictions other (see comments)  LUE/LLE altered sensation  -SS     Barriers to Rehab medically complex  -SS       Row Name 24 151          Cognition    Orientation Status (Cognition) oriented x 4  -SS       Row Name 24 151          Safety Issues, Functional Mobility    Safety Issues Affecting Function (Mobility) safety precaution awareness  -SS     Impairments Affecting Function (Mobility) coordination;shortness of breath;visual/perceptual;sensation/sensory awareness;endurance/activity tolerance  -SS               User Key  (r) = Recorded By, (t) = Taken By, (c) = Cosigned By      Initials Name Provider Type    SS Ana Cristina Jones PT Physical Therapist                   Mobility       Row Name 24 151          Bed Mobility    Bed Mobility supine-sit  -SS     Supine-Sit Castro (Bed Mobility) independent  -SS       Row Name 24 1510          Sit-Stand Transfer    Sit-Stand Castro (Transfers) independent  -SS       Row Name  01/17/24 1510          Gait/Stairs (Locomotion)    Rush Level (Gait) standby assist  -     Assistive Device (Gait) other (see comments)  none  -     Distance in Feet (Gait) 350  -SS     Deviations/Abnormal Patterns (Gait) base of support, narrow;stride length decreased  -     Comment, (Gait/Stairs) Pt. ambulated with a step through gait pattern. VC for upright posture. No instability noted this date. Activity limited by fatigue.  -               User Key  (r) = Recorded By, (t) = Taken By, (c) = Cosigned By      Initials Name Provider Type     Ana Cristina Jones PT Physical Therapist                   Obj/Interventions       Row Name 01/17/24 1512          Motor Skills    Therapeutic Exercise hip;knee;ankle  -       Row Name 01/17/24 1512          Hip (Therapeutic Exercise)    Hip (Therapeutic Exercise) strengthening exercise  -     Hip Strengthening (Therapeutic Exercise) bilateral;aBduction;aDduction;marching while seated;10 repetitions  -       Row Name 01/17/24 1512          Knee (Therapeutic Exercise)    Knee (Therapeutic Exercise) strengthening exercise  -     Knee Strengthening (Therapeutic Exercise) bilateral;SLR (straight leg raise);heel slides;10 repetitions;3 sets;LAQ (long arc quad)  -       Row Name 01/17/24 1512          Ankle (Therapeutic Exercise)    Ankle (Therapeutic Exercise) AROM (active range of motion)  -     Ankle AROM (Therapeutic Exercise) bilateral;dorsiflexion;plantarflexion;10 repetitions  -       Row Name 01/17/24 1512          Balance    Balance Assessment sitting static balance;sitting dynamic balance;sit to stand dynamic balance;standing static balance;standing dynamic balance  -     Static Sitting Balance independent  -     Dynamic Sitting Balance independent  -     Position, Sitting Balance unsupported;sitting in chair  -     Sit to Stand Dynamic Balance independent  -     Static Standing Balance independent  -     Dynamic Standing Balance  standby assist  -     Position/Device Used, Standing Balance unsupported  -     Balance Interventions sitting;standing;sit to stand;supported;static;dynamic  -               User Key  (r) = Recorded By, (t) = Taken By, (c) = Cosigned By      Initials Name Provider Type    Ana Cristina Pollard PT Physical Therapist                   Goals/Plan    No documentation.                  Clinical Impression       Row Name 01/17/24 1513          Pain    Pretreatment Pain Rating 0/10 - no pain  -SS     Posttreatment Pain Rating 0/10 - no pain  -SS     Pain Intervention(s) Repositioned;Ambulation/increased activity;Elevated  -     Additional Documentation Pain Scale: Numbers Pre/Post-Treatment (Group)  -       Row Name 01/17/24 1513          Plan of Care Review    Plan of Care Reviewed With patient  -     Progress improving  -     Outcome Evaluation Pt. continues to present below baseline function w/decreased functional endurance affecting her ability to safely participate in functional mobility. She performed bed mobility and transfers independently and ambulated 350' no AD, contact guard assist. Activity limited by fatigue. Pt. tolerated ther-ex well. Continue IPPT POC to progress as tolerated.  -       Row Name 01/17/24 1513          Therapy Assessment/Plan (PT)    Rehab Potential (PT) good, to achieve stated therapy goals  -     Criteria for Skilled Interventions Met (PT) yes;meets criteria;skilled treatment is necessary  -     Therapy Frequency (PT) daily  -       Row Name 01/17/24 1513          Positioning and Restraints    Pre-Treatment Position in bed  -SS     Post Treatment Position chair  -SS     In Chair notified nsg;reclined;call light within reach;encouraged to call for assist;legs elevated  RN aware no alarm  -               User Key  (r) = Recorded By, (t) = Taken By, (c) = Cosigned By      Initials Name Provider Type    Ana Cristina Pollard, PT Physical Therapist                   Outcome  Measures       Row Name 01/17/24 1515 01/17/24 0800       How much help from another person do you currently need...    Turning from your back to your side while in flat bed without using bedrails? 4  -SS 4  -AC    Moving from lying on back to sitting on the side of a flat bed without bedrails? 4  -SS 4  -AC    Moving to and from a bed to a chair (including a wheelchair)? 3  -SS 4  -AC    Standing up from a chair using your arms (e.g., wheelchair, bedside chair)? 4  -SS 4  -AC    Climbing 3-5 steps with a railing? 3  -SS 4  -AC    To walk in hospital room? 3  -SS 4  -AC    AM-PAC 6 Clicks Score (PT) 21  -SS 24  -AC    Highest Level of Mobility Goal 6 --> Walk 10 steps or more  -SS 8 --> Walked 250 feet or more  -AC      Row Name 01/17/24 1515          Functional Assessment    Outcome Measure Options AM-PAC 6 Clicks Basic Mobility (PT)  -               User Key  (r) = Recorded By, (t) = Taken By, (c) = Cosigned By      Initials Name Provider Type    AC Rachel Granda, RN Registered Nurse    Ana Cristina Pollard, PT Physical Therapist                                 Physical Therapy Education       Title: PT OT SLP Therapies (Done)       Topic: Physical Therapy (Done)       Point: Mobility training (Done)       Learning Progress Summary             Patient Eager, E, VU,DU,NR by  at 1/17/2024 1515    Comment: Reviewed safety/technique w/ambulation, HEP, PT POC    Acceptance, E, VU by  at 1/15/2024 1608    Comment: Educated pt on safety w/ mobility, PT POC, and d/c planning                         Point: Home exercise program (Done)       Learning Progress Summary             Patient Eager, E, VU,DU,NR by  at 1/17/2024 1515    Comment: Reviewed safety/technique w/ambulation, HEP, PT POC                         Point: Body mechanics (Done)       Learning Progress Summary             Patient Eager, E, VU,DU,NR by  at 1/17/2024 1515    Comment: Reviewed safety/technique w/ambulation, HEP, PT POC    Acceptance, E, VU by   at 1/15/2024 1608    Comment: Educated pt on safety w/ mobility, PT POC, and d/c planning                         Point: Precautions (Done)       Learning Progress Summary             Patient Eager, E, VU,DU,NR by  at 1/17/2024 1515    Comment: Reviewed safety/technique w/ambulation, HEP, PT POC    Acceptance, E, VU by  at 1/15/2024 1608    Comment: Educated pt on safety w/ mobility, PT POC, and d/c planning                                         User Key       Initials Effective Dates Name Provider Type Discipline     06/01/21 -  Ana Cristina Jones, PT Physical Therapist PT     09/21/23 -  Jessenia Herrera, PT Physical Therapist PT                  PT Recommendation and Plan     Plan of Care Reviewed With: patient  Progress: improving  Outcome Evaluation: Pt. continues to present below baseline function w/decreased functional endurance affecting her ability to safely participate in functional mobility. She performed bed mobility and transfers independently and ambulated 350' no AD, contact guard assist. Activity limited by fatigue. Pt. tolerated ther-ex well. Continue IPPT POC to progress as tolerated.     Time Calculation:         PT Charges       Row Name 01/17/24 1516             Time Calculation    Start Time 1453  -SS      PT Received On 01/17/24  -SS         Timed Charges    56339 - PT Therapeutic Exercise Minutes 10  -SS      27799 - Gait Training Minutes  5  -SS         Total Minutes    Timed Charges Total Minutes 15  -SS       Total Minutes 15  -SS                User Key  (r) = Recorded By, (t) = Taken By, (c) = Cosigned By      Initials Name Provider Type     Ana Cristina Jones, PT Physical Therapist                  Therapy Charges for Today       Code Description Service Date Service Provider Modifiers Qty    05892430178 HC PT THER PROC EA 15 MIN 1/17/2024 Ana Cristina Jones PT GP 1            PT G-Codes  Outcome Measure Options: AM-PAC 6 Clicks Basic Mobility (PT)  AM-PAC 6 Clicks Score (PT):  21  AM-PAC 6 Clicks Score (OT): 24  PT Discharge Summary  Anticipated Discharge Disposition (PT): home with assist    Ana Cristina Jones, PT  1/17/2024

## 2024-01-17 NOTE — PROGRESS NOTES
[facility] Neurology    Progress Note    Patient Name: Starr Armenta  : 1980  MRN: [id number]  Primary Care Physician:  Alexandr Corbin PA-C  Date of admission: 2024    Subjective     Reason for consult: Numbness    History of Present Illness   Feels symptoms have slightly improved today, but still has ongoing full body numbness and tightness in her left hand.    Review of Systems   General: Negative for fever, nausea, or vomiting.   Neurological: Negative for headache, pain, or weakness.     Objective     Vitals:   Temp:  [98.2 °F (36.8 °C)-98.5 °F (36.9 °C)] 98.4 °F (36.9 °C)  Heart Rate:  [55-92] 92  Resp:  [14-18] 16  BP: (123-148)/(75-96) 145/96    Neurologic Exam   Mental status/Cognition: alert; appropriate  Speech/language: fluent; comprehension intact    Cranial nerves: Tracks around room, face appears symmetric.  No dysarthria    Motor: Moves all extremities with some slowness of the left extremity      Current Medications    Current Facility-Administered Medications:     acetaminophen (TYLENOL) tablet 650 mg, 650 mg, Oral, Q4H PRN, Gail Lares MD    sennosides-docusate (PERICOLACE) 8.6-50 MG per tablet 2 tablet, 2 tablet, Oral, BID, 2 tablet at 24 **AND** polyethylene glycol (MIRALAX) packet 17 g, 17 g, Oral, Daily PRN **AND** bisacodyl (DULCOLAX) EC tablet 5 mg, 5 mg, Oral, Daily PRN **AND** bisacodyl (DULCOLAX) suppository 10 mg, 10 mg, Rectal, Daily PRN, Gail Lares MD    cholecalciferol (VITAMIN D3) tablet 1,000 Units, 1,000 Units, Oral, Daily, Kevin Colón MD, 1,000 Units at 24 0823    levothyroxine (SYNTHROID, LEVOTHROID) tablet 88 mcg, 88 mcg, Oral, Q AM, Gail Lares MD, 88 mcg at 24 0525    pantoprazole (PROTONIX) EC tablet 40 mg, 40 mg, Oral, Q AM, Gail Lares MD, 40 mg at 24 0525    sodium chloride 0.9 % flush 10 mL, 10 mL, Intravenous, PRN, Em Cox MD    sodium chloride 0.9 % flush 10 mL, 10 mL, Intravenous,  "Q12H, Gail Lares MD, 10 mL at 01/17/24 0823    sodium chloride 0.9 % flush 10 mL, 10 mL, Intravenous, Luda AVERY Jocelyn, MD    sodium chloride 0.9 % infusion 40 mL, 40 mL, Intravenous, Luda AVERY Jocelyn, MD    Laboratory Results:   Lab Results   Component Value Date    GLUCOSE 112 (H) 01/13/2024    CALCIUM 8.4 (L) 01/13/2024     01/13/2024    K 3.7 01/13/2024    CO2 27.0 01/13/2024     01/13/2024    BUN 18 01/13/2024    CREATININE 1.00 01/13/2024    EGFRIFNONA 63 01/27/2020    BCR 25.7 (H) 01/13/2024    ANIONGAP 10.0 01/13/2024     Lab Results   Component Value Date    WBC 14.47 (H) 01/13/2024    HGB 11.2 (L) 01/13/2024    HCT 33 (L) 01/13/2024    MCV 87.6 01/13/2024     01/13/2024     Lab Results   Component Value Date    CHOL 157 01/14/2020     Lab Results   Component Value Date    HDL 38 (L) 01/14/2020     Lab Results   Component Value Date    LDL 95 01/14/2020     Lab Results   Component Value Date    TRIG 121 01/14/2020     No results found for: \"HGBA1C\"  No results found for: \"INR\", \"PROTIME\"  Lab Results   Component Value Date    FOLATE 12.10 01/13/2024     Lab Results   Component Value Date    XLJSMCCN32 440 01/13/2024     HIV negative  RPR +1:4  Treponema antibodies nonreactive      VDRL CSF pending  FTA CSF pending  MS profile pending    MOG negative  NMO pending   Lyme disease negative    Images/Procedures   MRI brain with and without contrast 1/9/2024  No enhancing lesions noted.  There are periventricular white matter T2 lesions consistent with MS.  Not markedly different since 8/24/2023    MRI cervical spine with and without contrast 1/13/2024  Focal areas of spinal cord signal abnormality at C2 level, posterior at C3-C4 and left at C5.  Associated contrast-enhancement of C3-4 compatible with active demyelination.    MRI thoracic spine with and without contrast 1/13/2024  Mild multilevel degenerative changes of the thoracic spine.  No evidence of demyelination    Assessment / " Plan   Active Hospital Problems:  MS flare    Brief Patient Summary:  Starr Armenta is a 43 y.o. female with history of MS presenting with full body numbness, tightness of the left hand.  MRI of the cervical spine showed focal area of active demyelination at C3-C4.  MRI brain showed no active demyelination, only chronic periventricular changes.  CSF studies show no current inflammatory or infectious changes.  She has been treated with at least 5 days of IV methylprednisolone with mild improvement in her symptoms.  She is agreeable to treatment of rituximab prior to discharge.      Plan:   -Completed IV methylprednisolone x 5 days  -Rituximab 1000 mg once   -Patient is okay to discharge tomorrow, 1/18, if tolerates rituximab infusion  -Follow-up with outpatient neurology, Dr. Márquez on 1/19/2024    I have discussed the above with the patient, hospitalist  Time spent with patient: 45 minutes in face-to-face evaluation and management of the patient.      Luan Silva DO, MS

## 2024-01-17 NOTE — CASE MANAGEMENT/SOCIAL WORK
Discharge Planning Assessment  [facility]     Patient Name: Starr Armenta  MRN: [id number]  Today's Date: 1/17/2024    Admit Date: 1/13/2024    Plan: Home   Discharge Needs Assessment       Row Name 01/17/24 0825       Living Environment    People in Home spouse;child(dmitriy), dependent;other relative(s)    Name(s) of People in Home Mo-in-law and 11 & 11 y/o children    Current Living Arrangements home    Potentially Unsafe Housing Conditions none    In the past 12 months has the electric, gas, oil, or water company threatened to shut off services in your home? No    Primary Care Provided by self    Provides Primary Care For child(dmitriy)    Family Caregiver if Needed spouse    Quality of Family Relationships helpful;involved;supportive    Able to Return to Prior Arrangements yes       Resource/Environmental Concerns    Resource/Environmental Concerns none    Transportation Concerns none       Transportation Needs    In the past 12 months, has lack of transportation kept you from medical appointments or from getting medications? no    In the past 12 months, has lack of transportation kept you from meetings, work, or from getting things needed for daily living? No       Food Insecurity    Within the past 12 months, you worried that your food would run out before you got the money to buy more. Never true    Within the past 12 months, the food you bought just didn't last and you didn't have money to get more. Never true       Transition Planning    Patient/Family Anticipates Transition to home    Patient/Family Anticipated Services at Transition none    Transportation Anticipated family or friend will provide       Discharge Needs Assessment    Readmission Within the Last 30 Days no previous admission in last 30 days    Equipment Currently Used at Home none    Concerns to be Addressed denies needs/concerns at this time    Anticipated Changes Related to Illness none    Equipment Needed After Discharge none                    Discharge Plan       Row Name 01/17/24 0827       Plan    Plan Home    Patient/Family in Agreement with Plan yes    Plan Comments CM spoke with patient at bedside regarding DC planning. Patient resides in [facility] with her spouse, LIONEL and 2 children. Patient is independent with ADL's, denies any DME. Patient denies any current home health or outpatient services. Patient has medical insurance, prescription coverage and is able to afford/obtain medications without difficulty. Patient has no advanced directives. Patient denies any discharge planning needs. Plan is home. Spouse will provide transportation to home. CM will continue to follow    Final Discharge Disposition Code 01 - home or self-care                  Continued Care and Services - Admitted Since 1/13/2024    Coordination has not been started for this encounter.       Expected Discharge Date and Time       Expected Discharge Date Expected Discharge Time    Jan 17, 2024            Demographic Summary       Row Name 01/17/24 0819       General Information    Arrived From home    Referral Source physician    Reason for Consult discharge planning    Preferred Language English       Contact Information    Contact Information Comments Manny Armenta Spouse [phone]                   Functional Status       Row Name 01/17/24 0820       Functional Status    Usual Activity Tolerance good    Current Activity Tolerance good       Physical Activity    On average, how many days per week do you engage in moderate to strenuous exercise (like a brisk walk)? 5 days    On average, how many minutes do you engage in exercise at this level? 20 min    Number of minutes of exercise per week 100       Assessment of Health Literacy    How often do you have someone help you read hospital materials? Never    How often do you have problems learning about your medical condition because of difficulty understanding written information? Never    How often do you have a problem  understanding what is told to you about your medical condition? Never    How confident are you filling out medical forms by yourself? Quite a bit    Health Literacy Good       Functional Status, IADL    Medications independent    Meal Preparation independent    Housekeeping independent    Laundry independent    Shopping independent       Mental Status    General Appearance WDL WDL       Mental Status Summary    Recent Changes in Mental Status/Cognitive Functioning no changes       Employment/    Employment Status employed full-time                   Psychosocial    No documentation.                  Abuse/Neglect    No documentation.                  Legal    No documentation.                  Substance Abuse    No documentation.                  Patient Forms    No documentation.                     Karena Cochran RN

## 2024-01-17 NOTE — PLAN OF CARE
Problem: Adult Inpatient Plan of Care  Goal: Optimal Comfort and Wellbeing  Outcome: Ongoing, Progressing  Intervention: Provide Person-Centered Care  Recent Flowsheet Documentation  Taken 1/17/2024 1600 by Russell Herman, RN  Trust Relationship/Rapport:   care explained   choices provided   emotional support provided   empathic listening provided   Goal Outcome Evaluation:           Progress: improving     Nervous about starting Rituximab infusion this evening, otherwise no complaints. No pain. Extremities numb per patient.

## 2024-01-17 NOTE — PLAN OF CARE
Goal Outcome Evaluation:  Plan of Care Reviewed With: patient Ambulating adlib with steady gait. Reports of numbness in all 4 extremities with tingling in left upper. Pain 3/10 at lumbar puncture site no treatment required. Voiding without difficulty. SR on tele. Awaiting lab results.

## 2024-01-17 NOTE — PLAN OF CARE
Goal Outcome Evaluation:  Plan of Care Reviewed With: patient        Progress: improving  Outcome Evaluation: Pt. continues to present below baseline function w/decreased functional endurance affecting her ability to safely participate in functional mobility. She performed bed mobility and transfers independently and ambulated 350' no AD, contact guard assist. Activity limited by fatigue. Pt. tolerated ther-ex well. Continue IPPT POC to progress as tolerated.      Anticipated Discharge Disposition (PT): home with assist

## 2024-01-17 NOTE — PROGRESS NOTES
[facility] Medicine Services  PROGRESS NOTE    Patient Name: Starr Armenta  : 1980  MRN: [id number]    Date of Admission: 2024  Primary Care Physician: Alexandr Corbin PA-C    Subjective   Subjective     CC:  numbness and paresthesias    HPI:  Concerned her low back is more achy after LP.  No headache       Objective   Objective     Vital Signs:   Temp:  [97.6 °F (36.4 °C)-98.5 °F (36.9 °C)] 97.6 °F (36.4 °C)  Heart Rate:  [55-92] 74  Resp:  [14-18] 16  BP: (123-148)/(75-97) 147/97     Physical Exam:  Gen:  WD/WN  Neuro: alert and oriented, clear speech, follows commands, grossly nonfocal, anxious , moves about easily on the bed   HEENT:  NC/AT PERRL, OP benign  Neck:  Supple, no LAD  Heart RRR no murmur, rub, or gallop  Abd:  Soft, nontender, no rebound or guarding, pos BS  Extrem:  No c/c/e  Back - pinprick wound at LP site, no surrounding buising or tenderness       Results Reviewed:  LAB RESULTS:      Lab 24  1038 24  0639 24  0254   WBC  --   --  14.47*   HEMOGLOBIN  --   --  13.9   HEMOGLOBIN, POC 11.2*  --   --    HEMATOCRIT  --   --  41.6   HEMATOCRIT POC 33*  --   --    PLATELETS  --   --  321   NEUTROS ABS  --   --  9.63*   IMMATURE GRANS (ABS)  --   --  0.08*   LYMPHS ABS  --   --  3.79*   MONOS ABS  --   --  0.94*   EOS ABS  --   --  0.01   MCV  --   --  87.6   CRP  --  <0.30  --          Lab 24  1038 24  0254   SODIUM  --  141   POTASSIUM  --  3.7   CHLORIDE  --  104   CO2  --  27.0   ANION GAP  --  10.0   BUN  --  18   CREATININE 1.00 0.70   EGFR 70.9 108.8   GLUCOSE  --  112*   CALCIUM  --  8.4*         Lab 24  0254   TOTAL PROTEIN 7.6   ALBUMIN 4.2   GLOBULIN 3.4   ALT (SGPT) 59*   AST (SGOT) 48*   BILIRUBIN 0.7   ALK PHOS 68         Lab 24  0639 24  0254   HSTROP T 8 6             Lab 24  0254   FOLATE 12.10   VITAMIN B 12 440         Brief Urine Lab Results  (Last result in the past 365 days)         Color   Clarity   Blood   Leuk Est   Nitrite   Protein   CREAT   Urine HCG        01/13/24 0702 Yellow   Clear   Negative   Negative   Negative   30 mg/dL (1+)                   Microbiology Results Abnormal       None            No radiology results from the last 24 hrs        Current medications:  Scheduled Meds:acetaminophen, 650 mg, Oral, Once  cholecalciferol, 1,000 Units, Oral, Daily  diphenhydrAMINE, 50 mg, Intravenous, Once  levothyroxine, 88 mcg, Oral, Q AM  pantoprazole, 40 mg, Oral, Q AM  riTUXimab-arrx (RIABNI) 1,000 mg in sodium chloride 0.9 % 350 mL IVPB, 1,000 mg, Intravenous, Once  senna-docusate sodium, 2 tablet, Oral, BID  sodium chloride, 10 mL, Intravenous, Q12H  sodium chloride, 50 mL/hr, Intravenous, Once      Continuous Infusions:   PRN Meds:.  acetaminophen    senna-docusate sodium **AND** polyethylene glycol **AND** bisacodyl **AND** bisacodyl    diphenhydrAMINE    famotidine    Hydrocortisone Sod Suc (PF)    meperidine    sodium chloride    sodium chloride    sodium chloride    Assessment & Plan   Assessment & Plan     Active Hospital Problems    Diagnosis  POA    **Multiple sclerosis exacerbation [G35]  Yes    Multiple sclerosis [G35]  Yes      Resolved Hospital Problems   No resolved problems to display.        Brief Hospital Course to date:  Starr Armenta is a 43 y.o. female with MS diagnosed in [place] 20 yrs ago, now with flare which is persisting / worsening despite high-dose steroids given Jan 9-10 at [facility].  Returns to ED due to worsening symptoms     MS presumed flare  - Neurology consult appreciated   -  LP results reviewed    -  needs to establish care with outpt clinic - Dr Márquez   - steroids complete  - discussed with Neurology today.  Plan to give Rituxan and watch overnight.      +RPR:  checking VDRL on CSF   - treponema ab negative and CSF without protein.       Hypothyroid, fatty liver       Expected Discharge Location and Transportation: home vs snf  Expected Discharge    Expected Discharge Date: 1/18/2024; Expected Discharge Time:  9:00 AM     DVT prophylaxis:  Mechanical DVT prophylaxis orders are present.     AM-PAC 6 Clicks Score (PT): 24 (01/17/24 0800)    CODE STATUS:   Code Status and Medical Interventions:   Ordered at: 01/13/24 0821     Level Of Support Discussed With:    Patient     Code Status (Patient has no pulse and is not breathing):    CPR (Attempt to Resuscitate)     Medical Interventions (Patient has pulse or is breathing):    Full Support       Gail Lares MD  01/17/24

## 2024-01-17 NOTE — PAYOR COMM NOTE
"Denisse Tovar, MARISSA  Utilization Management  P:[phone]  F:[phone]    [facility]# [id number]   Continued stay clinicals    Maxx Armenta (43 y.o. Female)       Date of Birth   1980    Social Security Number       Address   [address] [facility]    Home Phone   [phone]    MRN   [id number]       [employer]   Taoist    Marital Status                               Admission Date   1/13/24    Admission Type   Emergency    Admitting Provider   Gail Lares MD    Attending Provider   Gail Lares MD    Department, Room/Bed   [facility] 3G, S363/1       Discharge Date       Discharge Disposition       Discharge Destination                                 Attending Provider: Gail Lares MD    Allergies: No Known Allergies    Isolation: None   Infection: None   Code Status: CPR    Ht: 180.3 cm (71\")   Wt: 111 kg (245 lb)    Admission Cmt: None   Principal Problem: Multiple sclerosis exacerbation [G35]                   Active Insurance as of 1/13/2024       Primary Coverage       Payor Plan Insurance Group Employer/Plan Group    [employer]UE [employer] [facility] [employer] PPO [id number]       Payor Plan Address Payor Plan Phone Number Payor Plan Fax Number Effective Dates    [address] [phone]  10/4/2018 - None Entered    [place] [address]         Subscriber Name Subscriber Birth Date Member ID       MAXX ARMENTA 1980 [id number]                Current Facility-Administered Medications   Medication Dose Route Frequency Provider Last Rate Last Admin    acetaminophen (TYLENOL) tablet 650 mg  650 mg Oral Q4H PRN Gail Lares MD        acetaminophen (TYLENOL) tablet 650 mg  650 mg Oral Once Luan Silva DO        sennosides-docusate (PERICOLACE) 8.6-50 MG per tablet 2 tablet  2 tablet Oral BID Gail Lares MD   2 tablet at 01/16/24 2017    And    polyethylene glycol (MIRALAX) packet 17 g  17 g Oral Daily PRN Gail Lares MD        And    " bisacodyl (DULCOLAX) EC tablet 5 mg  5 mg Oral Daily PRN Gail Lares MD        And    bisacodyl (DULCOLAX) suppository 10 mg  10 mg Rectal Daily PRN Gail Lares MD        cholecalciferol (VITAMIN D3) tablet 1,000 Units  1,000 Units Oral Daily Kevin Colón MD   1,000 Units at 01/17/24 0823    diphenhydrAMINE (BENADRYL) injection 50 mg  50 mg Intravenous Once Luan Silva,         diphenhydrAMINE (BENADRYL) injection 50 mg  50 mg Intravenous PRN Luan Silva,         famotidine (PEPCID) injection 20 mg  20 mg Intravenous PRN Luan Silva, DO        Hydrocortisone Sod Suc (PF) (Solu-CORTEF) injection 100 mg  100 mg Intravenous PRN Luan Silva,         levothyroxine (SYNTHROID, LEVOTHROID) tablet 88 mcg  88 mcg Oral Q AM Gail Lares MD   88 mcg at 01/17/24 0525    meperidine (DEMEROL) injection 25 mg  25 mg Intravenous Q20 Min PRN Luan Silva DO        pantoprazole (PROTONIX) EC tablet 40 mg  40 mg Oral Q AM Gail Lares MD   40 mg at 01/17/24 0525    riTUXimab-arrx (RIABNI) 1,000 mg in sodium chloride 0.9 % 350 mL IVPB  1,000 mg Intravenous Once Luan Silva DO        sodium chloride 0.9 % flush 10 mL  10 mL Intravenous PRN Em Cox MD        sodium chloride 0.9 % flush 10 mL  10 mL Intravenous Q12H Gail Lares MD   10 mL at 01/17/24 0823    sodium chloride 0.9 % flush 10 mL  10 mL Intravenous PRN Gail Lares MD        sodium chloride 0.9 % infusion 40 mL  40 mL Intravenous PRN Gail Lares MD        sodium chloride 0.9 % infusion  50 mL/hr Intravenous Once Luan Silva DO         Lab Results (last 24 hours)       Procedure Component Value Units Date/Time    Anti-Myelin Oligodendrocyte Glycoprotein (MOG), Serum [[id number]] Collected: 01/14/24 0621    Specimen: Blood Updated: 01/16/24 1509     MOG Antibody, Cell-based IFA Negative    Narrative:      Test(s) 754664-TKW Antibody, Cell-based IFA  was developed and its performance characteristics determined  by  [facility]. It has not been cleared or approved by the Food  and Drug Administration.  Performed at:   [facility] [address]  [id number]  : Matilde Diego MD, Phone:  [phone]    Treponema Pallidum, Confirmation [[id number]] Collected: 24    Specimen: Blood Updated: 24 1410     Treponema pallidum Antibodies Non Reactive    Narrative:      Performed at:   [facility]  [id number]  : Andrez Blanco PhD, Phone:  [phone]                 Physician Progress Notes (last 24 hours)        Gail Lares MD at 24 1108              [facility] Medicine Services  PROGRESS NOTE    Patient Name: Starr Armenta  : 1980  MRN: [id number]    Date of Admission: 2024  Primary Care Physician: Alexandr Corbin PA-C    Subjective   Subjective     CC:  numbness and paresthesias    HPI:  Concerned her low back is more achy after LP.  No headache       Objective   Objective     Vital Signs:   Temp:  [97.6 °F (36.4 °C)-98.5 °F (36.9 °C)] 97.6 °F (36.4 °C)  Heart Rate:  [55-92] 74  Resp:  [14-18] 16  BP: (123-148)/(75-97) 147/97     Physical Exam:  Gen:  WD/WN  Neuro: alert and oriented, clear speech, follows commands, grossly nonfocal, anxious , moves about easily on the bed   HEENT:  NC/AT PERRL, OP benign  Neck:  Supple, no LAD  Heart RRR no murmur, rub, or gallop  Abd:  Soft, nontender, no rebound or guarding, pos BS  Extrem:  No c/c/e  Back - pinprick wound at LP site, no surrounding buising or tenderness       Results Reviewed:  LAB RESULTS:      Lab 24  1038 24  0639 24  0254   WBC  --   --  14.47*   HEMOGLOBIN  --   --  13.9   HEMOGLOBIN, POC 11.2*  --   --    HEMATOCRIT  --   --  41.6   HEMATOCRIT POC 33*  --   --    PLATELETS  --   --  321   NEUTROS ABS  --   --  9.63*   IMMATURE GRANS (ABS)  --   --  0.08*   LYMPHS ABS  --   --  3.79*   MONOS ABS  --   --   0.94*   EOS ABS  --   --  0.01   MCV  --   --  87.6   CRP  --  <0.30  --          Lab 01/13/24  1038 01/13/24  0254   SODIUM  --  141   POTASSIUM  --  3.7   CHLORIDE  --  104   CO2  --  27.0   ANION GAP  --  10.0   BUN  --  18   CREATININE 1.00 0.70   EGFR 70.9 108.8   GLUCOSE  --  112*   CALCIUM  --  8.4*         Lab 01/13/24  0254   TOTAL PROTEIN 7.6   ALBUMIN 4.2   GLOBULIN 3.4   ALT (SGPT) 59*   AST (SGOT) 48*   BILIRUBIN 0.7   ALK PHOS 68         Lab 01/13/24  0639 01/13/24 0254   HSTROP T 8 6             Lab 01/13/24  0254   FOLATE 12.10   VITAMIN B 12 440         Brief Urine Lab Results  (Last result in the past 365 days)        Color   Clarity   Blood   Leuk Est   Nitrite   Protein   CREAT   Urine HCG        01/13/24 0702 Yellow   Clear   Negative   Negative   Negative   30 mg/dL (1+)                   Microbiology Results Abnormal       None            No radiology results from the last 24 hrs        Current medications:  Scheduled Meds:acetaminophen, 650 mg, Oral, Once  cholecalciferol, 1,000 Units, Oral, Daily  diphenhydrAMINE, 50 mg, Intravenous, Once  levothyroxine, 88 mcg, Oral, Q AM  pantoprazole, 40 mg, Oral, Q AM  riTUXimab-arrx (RIABNI) 1,000 mg in sodium chloride 0.9 % 350 mL IVPB, 1,000 mg, Intravenous, Once  senna-docusate sodium, 2 tablet, Oral, BID  sodium chloride, 10 mL, Intravenous, Q12H  sodium chloride, 50 mL/hr, Intravenous, Once      Continuous Infusions:   PRN Meds:.  acetaminophen    senna-docusate sodium **AND** polyethylene glycol **AND** bisacodyl **AND** bisacodyl    diphenhydrAMINE    famotidine    Hydrocortisone Sod Suc (PF)    meperidine    sodium chloride    sodium chloride    sodium chloride    Assessment & Plan   Assessment & Plan     Active Hospital Problems    Diagnosis  POA    **Multiple sclerosis exacerbation [G35]  Yes    Multiple sclerosis [G35]  Yes      Resolved Hospital Problems   No resolved problems to display.        Brief Hospital Course to date:  Starr Armenta  is a 43 y.o. female with MS diagnosed in [place] 20 yrs ago, now with flare which is persisting / worsening despite high-dose steroids given -10 at [facility].  Returns to ED due to worsening symptoms     MS presumed flare  - Neurology consult appreciated   -  LP results reviewed    -  needs to establish care with outpt clinic - Dr Márquez   - steroids complete  - discussed with Neurology today.  Plan to give Rituxan and watch overnight.      +RPR:  checking VDRL on CSF   - treponema ab negative and CSF without protein.       Hypothyroid, fatty liver       Expected Discharge Location and Transportation: home vs snf  Expected Discharge   Expected Discharge Date: 2024; Expected Discharge Time:  9:00 AM     DVT prophylaxis:  Mechanical DVT prophylaxis orders are present.     AM-PAC 6 Clicks Score (PT): 24 (24 0800)    CODE STATUS:   Code Status and Medical Interventions:   Ordered at: 24 0821     Level Of Support Discussed With:    Patient     Code Status (Patient has no pulse and is not breathing):    CPR (Attempt to Resuscitate)     Medical Interventions (Patient has pulse or is breathing):    Full Support       Gail Lares MD  24        Electronically signed by Gail Lares MD at 24 1119          Luan Silva DO   Physician  Neurology     Progress Notes     Signed     Date of Service: 24 111  Creation Time: 24 111     Signed          [facility] Neurology    Progress Note     Patient Name: Starr Armenta  : 1980  MRN: [id number]  Primary Care Physician:  Alexandr Corbin PA-C  Date of admission: 2024     Subjective      Reason for consult: Numbness     History of Present Illness   Ongoing tingling, tightness in her left hand.  She feels a tightness around her chest and abdomen.     Review of Systems   General: Negative for fever, nausea, or vomiting.   Neurological: Negative for headache, pain, or weakness.      Objective      Vitals:   Temp:   [97.8 °F (36.6 °C)-99.8 °F (37.7 °C)] 99.8 °F (37.7 °C)  Heart Rate:  [50-94] 67  Resp:  [16-18] 18  BP: (109-146)/(60-88) 135/76     Neurologic Exam   Mental status/Cognition: alert; appropriate  Speech/language: fluent; comprehension intact     Cranial nerves: EOMI. PERRL. Face appears symmetric. No dysarthria.  Tongue protrudes midline     Motor: No drift in any extremity.  Handgrip 5/5 bilaterally.  Finger flexors 5/5 on the right, 4/5 on the left.     Sensation: Diminished to light touch in the left upper extremity     Reflexes: 2+ at bilateral biceps, brachioradialis, patellas, achilles.  No clonus.  Toes equivocal.      Coordination/Complex Motor: Mild dysmetria on finger-to-nose on the left           Current Medications     Current Facility-Administered Medications:     acetaminophen (TYLENOL) tablet 650 mg, 650 mg, Oral, Q4H PRN, Gail Lares MD    sennosides-docusate (PERICOLACE) 8.6-50 MG per tablet 2 tablet, 2 tablet, Oral, BID, 2 tablet at 01/14/24 2133 **AND** polyethylene glycol (MIRALAX) packet 17 g, 17 g, Oral, Daily PRN **AND** bisacodyl (DULCOLAX) EC tablet 5 mg, 5 mg, Oral, Daily PRN **AND** bisacodyl (DULCOLAX) suppository 10 mg, 10 mg, Rectal, Daily PRN, Gail Lares MD    cholecalciferol (VITAMIN D3) tablet 1,000 Units, 1,000 Units, Oral, Daily, Kevin Colón MD, 1,000 Units at 01/16/24 0911    gadobenate dimeglumine (MULTIHANCE) injection 20 mL, 20 mL, Intravenous, Once in imaging, Gail Lares MD    levothyroxine (SYNTHROID, LEVOTHROID) tablet 88 mcg, 88 mcg, Oral, Q AM, Gail Lares MD, 88 mcg at 01/16/24 0533    pantoprazole (PROTONIX) EC tablet 40 mg, 40 mg, Oral, Q AM, Gail Lares MD, 40 mg at 01/16/24 0533    sodium chloride 0.9 % flush 10 mL, 10 mL, Intravenous, PRN, Em Cox MD    sodium chloride 0.9 % flush 10 mL, 10 mL, Intravenous, Q12H, Gail Lares MD, 10 mL at 01/16/24 0911    sodium chloride 0.9 % flush 10 mL, 10 mL, Intravenous, PRN, Mini,  "MD Gail    sodium chloride 0.9 % infusion 40 mL, 40 mL, Intravenous, PRN, Mini, MD Gail     Laboratory Results:         Lab Results   Component Value Date     GLUCOSE 112 (H) 01/13/2024     CALCIUM 8.4 (L) 01/13/2024      01/13/2024     K 3.7 01/13/2024     CO2 27.0 01/13/2024      01/13/2024     BUN 18 01/13/2024     CREATININE 1.00 01/13/2024     EGFRIFNONA 63 01/27/2020     BCR 25.7 (H) 01/13/2024     ANIONGAP 10.0 01/13/2024            Lab Results   Component Value Date     WBC 14.47 (H) 01/13/2024     HGB 11.2 (L) 01/13/2024     HCT 33 (L) 01/13/2024     MCV 87.6 01/13/2024      01/13/2024            Lab Results   Component Value Date     CHOL 157 01/14/2020            Lab Results   Component Value Date     HDL 38 (L) 01/14/2020            Lab Results   Component Value Date     LDL 95 01/14/2020            Lab Results   Component Value Date     TRIG 121 01/14/2020      No results found for: \"HGBA1C\"  No results found for: \"INR\", \"PROTIME\"        Lab Results   Component Value Date     FOLATE 12.10 01/13/2024            Lab Results   Component Value Date     TOPGPPAG27 440 01/13/2024      HIV negative  RPR +1:4  Treponema antibodies nonreactive       VDRL CSF pending  FTA CSF pending  MS profile pending     MOG negative  NMO pending   Lyme disease negative     Images/Procedures   MRI brain with and without contrast 1/9/2024  No enhancing lesions noted.  There are periventricular white matter T2 lesions consistent with MS.  Not markedly different since 8/24/2023     MRI cervical spine with and without contrast 1/13/2024  Focal areas of spinal cord signal abnormality at C2 level, posterior at C3-C4 and left at C5.  Associated contrast-enhancement of C3-4 compatible with active demyelination.     MRI thoracic spine with and without contrast 1/13/2024  Mild multilevel degenerative changes of the thoracic spine.  No evidence of demyelination     Assessment / Plan   Active Hospital " Problems:  MS flare     Brief Patient Summary:  Starr Armenta is a 43 y.o. female with history of MS presenting with full body numbness, tightness of the left hand.  MRI of the cervical spine showed focal area of active demyelination at C3-C4.  MRI brain showed no active demyelination, only chronic periventricular changes.  CSF studies show no current inflammatory or infectious changes.  She has been treated with at least 5 days of IV methylprednisolone with only mild improvement in her symptoms.     RPR initially returned positive however confirmatory treponemal antibody test was negative.  VDRL is pending.  Would consider starting rituximab prior to discharge if patient agreeable.     Plan:   -Completed IV methylprednisolone x 5 days  -Will plan for rituximab if patient agreeable and VDRL is negative  -Follow-up with outpatient neurology, Dr. Márquez on 1/19/2024     I have discussed the above with the patient, hospitalist  Time spent with patient: 45 minutes in face-to-face evaluation and management of the patient.        Luan Silva DO, MS

## 2024-01-18 ENCOUNTER — READMISSION MANAGEMENT (OUTPATIENT)
Dept: CALL CENTER | Facility: HOSPITAL | Age: 44
End: 2024-01-18
Payer: COMMERCIAL

## 2024-01-18 VITALS
HEART RATE: 71 BPM | OXYGEN SATURATION: 93 % | SYSTOLIC BLOOD PRESSURE: 106 MMHG | TEMPERATURE: 97.9 F | WEIGHT: 245 LBS | DIASTOLIC BLOOD PRESSURE: 67 MMHG | RESPIRATION RATE: 18 BRPM | BODY MASS INDEX: 34.3 KG/M2 | HEIGHT: 71 IN

## 2024-01-18 PROBLEM — G35 MULTIPLE SCLEROSIS EXACERBATION: Status: RESOLVED | Noted: 2024-01-13 | Resolved: 2024-01-18

## 2024-01-18 LAB — HBV SURFACE AB SER RIA-ACNC: NORMAL

## 2024-01-18 PROCEDURE — 99239 HOSP IP/OBS DSCHRG MGMT >30: CPT | Performed by: FAMILY MEDICINE

## 2024-01-18 RX ADMIN — Medication 10 ML: at 08:54

## 2024-01-18 RX ADMIN — SENNOSIDES AND DOCUSATE SODIUM 2 TABLET: 8.6; 5 TABLET ORAL at 08:52

## 2024-01-18 RX ADMIN — PANTOPRAZOLE SODIUM 40 MG: 40 TABLET, DELAYED RELEASE ORAL at 06:12

## 2024-01-18 RX ADMIN — Medication 1000 UNITS: at 08:52

## 2024-01-18 RX ADMIN — LEVOTHYROXINE SODIUM 88 MCG: 0.09 TABLET ORAL at 06:12

## 2024-01-18 NOTE — NURSING NOTE
Tolerated well. VS stable and i.v. with excellent blood return. Slept throughout treatment. No co.any problems.

## 2024-01-18 NOTE — PLAN OF CARE
Tolerated chemotherapy.  VSS.  No c/o pain.  Voiding.  Numbness to hands and feet per patient

## 2024-01-18 NOTE — DISCHARGE SUMMARY
[facility] Medicine Services  DISCHARGE SUMMARY    Patient Name: Starr Armenta  : 1980  MRN: [id number]    Date of Admission: 2024  1:32 AM  Date of Discharge:  2024  Primary Care Physician: Alexandr Corbin PA-C    Consults       Date and Time Order Name Status Description    2024  8:21 AM Inpatient Neurology Consult General Completed     2024  9:56 PM Inpatient Neurology Consult Other (see comments) Completed             Hospital Course       Active Hospital Problems    Diagnosis  POA    Multiple sclerosis [G35]  Yes      Resolved Hospital Problems    Diagnosis Date Resolved POA    **Multiple sclerosis exacerbation [G35] 2024 Yes          Hospital Course:  Starr Armenta is a 43 y.o. female with MS diagnosed in [place] 20 yrs ago, now with flare which is persisting / worsening despite high-dose steroids given -10 at [facility].  Returned to ED due to worsening symptoms     MS presumed flare  -S/P Rituximab 1000mg IV x1, tolerated well  -S/P 5 days IV methylpred   -Pt has follow-up scheduled tomorrow with Dr Márquez      +RPR  -VDRL on CSF negative   - treponema ab negative and CSF without protein.       Hypothyroid, fatty liver       Discharge Follow Up Recommendations for outpatient labs/diagnostics:  -PCP 1 week  -Dr Márquez      Day of Discharge     HPI:   Patient seen and examined. No issues overnight. Tolerating Rituximab well. Still with numbness in hands/feet. Eager to go home.    Review of Systems  Gen- No fevers, chills  CV- No chest pain, palpitations  Resp- No cough, dyspnea  GI- No N/V/D, abd pain    Vital Signs:   Temp:  [96.9 °F (36.1 °C)-98.1 °F (36.7 °C)] 97.9 °F (36.6 °C)  Heart Rate:  [71-88] 71  Resp:  [17-24] 18  BP: (104-142)/(57-85) 106/67      Physical Exam:  Constitutional: No acute distress, awake, alert  HENT: NCAT, mucous membranes moist  Respiratory: Clear to auscultation bilaterally, respiratory effort normal    Cardiovascular: RRR, no murmurs, rubs, or gallops  Gastrointestinal: Positive bowel sounds, soft, nontender, nondistended  Musculoskeletal: No bilateral ankle edema  Psychiatric: Appropriate affect, cooperative  Neurologic: Oriented x 3, STARKEY, speech clear  Skin: No rashes     Pertinent  and/or Most Recent Results     LAB RESULTS:      Lab 01/17/24  1623 01/13/24  1038 01/13/24  0639 01/13/24  0254   WBC 19.01*  --   --  14.47*   HEMOGLOBIN 13.9  --   --  13.9   HEMOGLOBIN, POC  --  11.2*  --   --    HEMATOCRIT 39.9  --   --  41.6   HEMATOCRIT POC  --  33*  --   --    PLATELETS 341  --   --  321   NEUTROS ABS 13.88*  --   --  9.63*   IMMATURE GRANS (ABS) 0.72*  --   --  0.08*   LYMPHS ABS 2.83  --   --  3.79*   MONOS ABS 1.54*  --   --  0.94*   EOS ABS 0.00  --   --  0.01   MCV 84.7  --   --  87.6   CRP  --   --  <0.30  --          Lab 01/17/24  1623 01/13/24  1038 01/13/24  0254   SODIUM 140  --  141   POTASSIUM 3.7  --  3.7   CHLORIDE 105  --  104   CO2 25.0  --  27.0   ANION GAP 10.0  --  10.0   BUN 23*  --  18   CREATININE 0.73 1.00 0.70   EGFR 103.5 70.9 108.8   GLUCOSE 132*  --  112*   CALCIUM 8.2*  --  8.4*         Lab 01/17/24  1623 01/13/24  0254   TOTAL PROTEIN 6.4 7.6   ALBUMIN 3.7 4.2   GLOBULIN 2.7 3.4   ALT (SGPT) 45* 59*   AST (SGOT) 24 48*   BILIRUBIN 0.5 0.7   ALK PHOS 78 68         Lab 01/13/24  0639 01/13/24  0254   HSTROP T 8 6             Lab 01/13/24  0254   FOLATE 12.10   VITAMIN B 12 440         Brief Urine Lab Results  (Last result in the past 365 days)        Color   Clarity   Blood   Leuk Est   Nitrite   Protein   CREAT   Urine HCG        01/13/24 0702 Yellow   Clear   Negative   Negative   Negative   30 mg/dL (1+)                 Microbiology Results (last 10 days)       ** No results found for the last 240 hours. **            IR LUMBAR PUNCTURE DIAGNOSTIC    Result Date: 1/17/2024  IR LUMBAR PUNCTURE DIAGNOSTIC Date of Exam: 1/16/2024 8:28 AM EST Indication: MS Comparison: None  available. Technique: Procedure, risks, complications, and side effects of lumbar puncture were discussed and reviewed in detail with the patient including the possibility for bleeding, infection, needle damage to surrounding structures, and the potential for nondiagnostic exam. The patient indicated understanding and consented to the procedure. The patient was placed in the prone position on the table. The back was prepped and draped in a sterile fashion. 1% lidocaine was used as local anesthetic to the skin and subcutaneous tissues. Under fluoroscopic guidance a 22-gauge spinal needle was advanced at the L3-L4 level to the CSF space. Approximately 8 cc of clear and colorless CSF was returned and collected in 4 numbered vials. Sample vials were labeled and sent to pathology for further analysis. The needle was removed. Fluoroscopic Time: 6 seconds Number of images saved: 1 Findings: The patient tolerated the procedure well, and no immediate complications occurred.     Impression: Successful fluoroscopic guided lumbar puncture as above with no immediate complications. Electronically Signed: Aguila Zhong MD  1/17/2024 9:27 PM EST  Workstation ID: [id number]    MRI Thoracic Spine With & Without Contrast    Result Date: 1/14/2024  MRI THORACIC SPINE W WO CONTRAST Date of Exam: 1/13/2024 9:33 PM EST Indication: MS, myelitis.  Comparison: None available. Technique:  Routine multiplanar/multisequence sequence images of the thoracic spine were obtained before and after the uneventful administration of 20 mL Multihance.  Findings: The alignment is anatomic. The vertebral body heights appear normal. There are mild degenerative endplate changes in the midthoracic spine. There is disc desiccation at T3-4. There is mild scattered facet arthropathy. The spinal canal and neural foramina  are widely patent throughout. The spinal cord appears normal in signal throughout. The posterior paravertebral soft tissues are unremarkable.  No pathologic enhancement or mass is identified.     Impression: 1.Spinal cord appears normal, with no evidence of demyelination. 2.Mild multilevel degenerative changes of thoracic spine as described above. Electronically Signed: Hola Eubanks MD  1/14/2024 11:06 AM EST  Workstation ID: [id number]    MRI Cervical Spine With & Without Contrast    Result Date: 1/14/2024  MRI CERVICAL SPINE W WO CONTRAST Date of Exam: 1/13/2024 9:30 PM EST Indication: MS, myelitis.  Comparison: None available. Technique:  Routine multiplanar/multisequence sequence images of the cervical spine were obtained before and after the uneventful administration of 20 mL Multihance.  Findings: The alignment is anatomic. The craniocervical junction appears intact. The vertebral body heights appear normal. There are mild degenerative endplate changes at C5-6 and C6-7. There are mild discogenic changes at C4-5 through C6-7. The partially visualized posterior fossa contents are unremarkable. There are several patchy areas of T2 hyperintensity within the spinal cord centrally at the C2 level, posteriorly at C3-4, and on the left at C5. There is associated enhancement in the spinal cord at C3-4 compatible with active demyelination. The prevertebral soft tissues are unremarkable. C2-3: No spinal canal or neural foraminal stenosis. C3-4: Mild bilateral facet arthropathy. Mild bilateral uncovertebral hypertrophy. No spinal canal stenosis. No neural foraminal stenosis. C4-5: Moderate bilateral facet arthropathy. Mild left uncovertebral hypertrophy. No spinal canal stenosis. Mild left neural foraminal stenosis. C5-6: Mild disc osteophyte complex. Moderate bilateral facet arthropathy. Mild bilateral uncovertebral hypertrophy. No spinal canal stenosis. No neural foraminal stenosis. C6-7: Mild disc bulge. Mild bilateral facet arthropathy. Mild left uncovertebral hypertrophy. No spinal canal stenosis. Mild left neural foraminal stenosis. C7-T1: Mild disc  bulge. No spinal canal stenosis. No neural foraminal stenosis.     Impression: 1.Focal areas of spinal cord signal normality as described above, compatible with history of multiple sclerosis. There is associated enhancement of the spinal cord at C3-4, compatible with active demyelination. 2.Mild degenerative changes of cervical spine as described above. Electronically Signed: Hola Eubanks MD  1/14/2024 10:43 AM EST  Workstation ID: [id number]    XR Chest 1 View    Result Date: 1/13/2024  XR CHEST 1 VW Date of Exam: 1/13/2024 2:45 AM EST Indication: Acute Stroke Protocol (onset < 12 hrs) Comparison: Chest radiograph January 9, 2024 Findings: There are no airspace consolidations. No pleural fluid. No pneumothorax. The pulmonary vasculature appears within normal limits. The cardiac and mediastinal silhouette appear unremarkable. No acute osseous abnormality identified.     Impression: No active disease Electronically Signed: Abran Hope MD  1/13/2024 3:08 AM EST  Workstation ID: [id number]    CT Head Without Contrast    Result Date: 1/13/2024  CT HEAD WO CONTRAST Date of Exam: 1/13/2024 2:36 AM EST Indication: Transient ischemic attack (TIA) MS flare, numbness first to left side 8 days ago, then to right side for 2 days. Comparison: MRI of the brain dated January 9, 2024; CT scan head dated January 9, 2024 Technique: Axial CT images were obtained of the head without contrast administration.  Automated exposure control and iterative construction methods were used. Findings: There is no evidence of hemorrhage. There is no mass effect or midline shift. There is no extracerebral collection. Ventricles are normal in size and configuration for patient's stated age.  Posterior fossa is within normal limits. Calvarium and skull base appear intact.   Visualized sinuses show no air fluid levels. Visualized orbits are unremarkable.     Impression: No acute intracranial abnormality Electronically Signed: Abran Hope MD   1/13/2024 2:56 AM EST  Workstation ID: [id number]    MRI Brain With & Without Contrast    Result Date: 1/9/2024  EXAM:   MR Head Without and With Intravenous Contrast  EXAM DATE:   1/9/2024 4:34 PM  CLINICAL HISTORY:   MS Flare  TECHNIQUE:   Magnetic resonance images of the head/brain without and with intravenous contrast in multiple planes.  COMPARISON:   No relevant prior studies available.  FINDINGS:   BRAIN AND EXTRA-AXIAL SPACES:  Unremarkable as visualized.  No hemorrhage.  No enhancing lesions identified. There are however periventricular white matter T2 lesions consistent with MS. Not markedly different since 8/24/2023.   BONES/JOINTS:  See above.   SINUSES:  Unremarkable as visualized.  No acute sinusitis.   MASTOID AIR CELLS:  Unremarkable as visualized.  No mastoid effusion.   ORBITS:  Unremarkable as visualized.        No enhancing lesions identified. There are however periventricular white matter T2 lesions consistent with MS. Not markedly different since 8/24/2023.   This report was finalized on 1/9/2024 5:03 PM by Dr. Gaurav Connor MD.      CT Head Without Contrast    Result Date: 1/9/2024  EXAM:   CT Head Without Intravenous Contrast  EXAM DATE:   1/9/2024 11:29 AM  CLINICAL HISTORY:   Transient ischemic attack (TIA)  TECHNIQUE:   Axial computed tomography images of the head/brain without intravenous contrast.  Sagittal and coronal reformatted images were created and reviewed.  This CT exam was performed using one or more of the following dose reduction techniques:  automated exposure control, adjustment of the mA and/or kV according to patient size, and/or use of iterative reconstruction technique.  COMPARISON:   No relevant prior studies available.  FINDINGS:   BRAIN AND EXTRA-AXIAL SPACES:  Unremarkable as visualized.  No hemorrhage.  No significant white matter disease.  No edema.  No ventriculomegaly.   BONES/JOINTS:  Unremarkable as visualized.  No acute fracture.   SOFT TISSUES:  Unremarkable as  visualized.   SINUSES:  Left maxillary sinus mucous retention cyst.   MASTOID AIR CELLS:  Unremarkable as visualized.  No mastoid effusion.        No acute findings in the head/brain.   This report was finalized on 1/9/2024 12:19 PM by Dr. Gaurav Connor MD.      XR Chest 1 View    Result Date: 1/9/2024  EXAM:   XR Chest, 1 View  EXAM DATE:   1/9/2024 10:53 AM  CLINICAL HISTORY:   Chest Pain Protocol  TECHNIQUE:   Frontal view of the chest.  COMPARISON:   January 20, 2023  FINDINGS:   LUNGS AND PLEURAL SPACES:  Unremarkable as visualized.  No consolidation.  No pneumothorax.   HEART:  Unremarkable as visualized.  No cardiomegaly.   MEDIASTINUM:  Unremarkable as visualized.   BONES/JOINTS:  Unremarkable as visualized.        Unremarkable exam. No acute cardiopulmonary findings identified.   This report was finalized on 1/9/2024 12:10 PM by Dr. Gaurav Connor MD.                   Plan for Follow-up of Pending Labs/Results: Inbox   Pending Labs       Order Current Status    Fluorescent treponema AB, CSF - Cerebrospinal Fluid, Lumbar Puncture In process    MS Profile & MBP, CSF - Cerebrospinal Fluid, Lumbar Puncture In process          Discharge Details        Discharge Medications        New Medications        Instructions Start Date   cholecalciferol 25 MCG (1000 UT) tablet  Commonly known as: VITAMIN D3   1,000 Units, Oral, Daily             Continue These Medications        Instructions Start Date   levothyroxine 88 MCG tablet  Commonly known as: SYNTHROID, LEVOTHROID   88 mcg, Oral, Daily      omeprazole 20 MG capsule  Commonly known as: priLOSEC   20 mg, Oral, Daily             Stop These Medications      predniSONE 10 MG tablet  Commonly known as: DELTASONE     vitamin D 1.25 MG (46368 UT) capsule capsule  Commonly known as: ERGOCALCIFEROL              No Known Allergies      Discharge Disposition:  Home or Self Care    Diet:  Hospital:  Diet Order   Procedures    Diet: Regular/House Diet; Texture: Regular Texture  (IDDSI 7); Fluid Consistency: Thin (IDDSI 0)            Activity:      Restrictions or Other Recommendations:    CODE STATUS:    Code Status and Medical Interventions:   Ordered at: 01/13/24 0821     Level Of Support Discussed With:    Patient     Code Status (Patient has no pulse and is not breathing):    CPR (Attempt to Resuscitate)     Medical Interventions (Patient has pulse or is breathing):    Full Support       Future Appointments   Date Time Provider Department Center   1/19/2024  9:30 AM Panda Márquez MD [facility]       Additional Instructions for the Follow-ups that You Need to Schedule       Discharge Follow-up with PCP   As directed       Currently Documented PCP:    Alexandr Corbin PA-C    PCP Phone Number:    [phone]     Follow Up Details: 1 week        Discharge Follow-up with Specified Provider: Dr Márquez as scheduled 1/19/24   As directed      To: Dr Márquez as scheduled 1/19/24                      Elizabeth Varma DO  01/18/24      Time Spent on Discharge:  I spent  42  minutes on this discharge activity which included: face-to-face encounter with the patient, reviewing the data in the system, coordination of the care with the nursing staff as well as consultants, documentation, and entering orders.

## 2024-01-19 ENCOUNTER — OFFICE VISIT (OUTPATIENT)
Dept: NEUROLOGY | Facility: CLINIC | Age: 44
End: 2024-01-19
Payer: COMMERCIAL

## 2024-01-19 VITALS
DIASTOLIC BLOOD PRESSURE: 80 MMHG | SYSTOLIC BLOOD PRESSURE: 132 MMHG | HEIGHT: 71 IN | OXYGEN SATURATION: 98 % | HEART RATE: 104 BPM | WEIGHT: 244.71 LBS | BODY MASS INDEX: 34.26 KG/M2

## 2024-01-19 DIAGNOSIS — M79.2 NEUROPATHIC PAIN: ICD-10-CM

## 2024-01-19 DIAGNOSIS — G35 MULTIPLE SCLEROSIS: Primary | ICD-10-CM

## 2024-01-19 LAB
ALB CSF/SERPL: 3 {RATIO} (ref 0–8)
ALBUMIN CSF-MCNC: 10 MG/DL (ref 8–37)
ALBUMIN SERPL-MCNC: 3.7 G/DL (ref 3.9–4.9)
IGG CSF-MCNC: 2 MG/DL (ref 0–6.7)
IGG SERPL-MCNC: 1033 MG/DL (ref 586–1602)
IGG SYNTH RATE SER+CSF CALC-MRATE: -0.3 MG/DAY
IGG/ALB CLEAR SER+CSF-RTO: 0.7 (ref 0–0.7)
IGG/ALB CSF: 0.2 {RATIO} (ref 0–0.25)
MBP CSF-MCNC: 10.4 NG/ML (ref 0–3.7)
OLIGOCLONAL BANDS.IT SER+CSF QL: ABNORMAL

## 2024-01-19 PROCEDURE — 99214 OFFICE O/P EST MOD 30 MIN: CPT | Performed by: PSYCHIATRY & NEUROLOGY

## 2024-01-19 RX ORDER — OXCARBAZEPINE 150 MG/1
150 TABLET, FILM COATED ORAL 2 TIMES DAILY
Qty: 60 TABLET | Refills: 5 | Status: SHIPPED | OUTPATIENT
Start: 2024-01-19

## 2024-01-19 NOTE — PROGRESS NOTES
"Subjective   Patient ID: Starr Armenta is a 43 y.o. female     Chief Complaint   Patient presents with    Multiple Sclerosis        History of Present Illness    43 y.o. female referred by Alexandr Corbin PA-C for MS.    L sided numbness and weakness.  L UE and LE are ataxic.      Numbness from T3 down.  L > R.      Dx 20 years old with left thumb numbness.      3 - 4 exacerbations sensory.      DMT:  GA for 10 years then stopped       Chronic sx of N/T in bottom and fingertips since onset.      Hug sensation.   Reviewed medical records:    Admitted [facility]  1/9/24 - 1/10/24 L sided weakness.  Tx IVMP.    Admitted [facility] 1/13/24 - 1/18/24 for MS flare.  S/P Rituxan, IVMP x 5 days.      MRI B/C.T, my review of films, few scattered T2 PVWM lesions, heterogenous cervical cord signal, enhancing lesion C3-4     Hep panel neg   CSF - WBC 3, RBC 20, VDRL negaitve     RPR titer 1:4, Treponema ab negative    Dx with MS 20 years,   Past Medical History:   Diagnosis Date    CTS (carpal tunnel syndrome)     Disease of thyroid gland     Multiple sclerosis      Family History   Problem Relation Age of Onset    Breast cancer Maternal Aunt      Social History     Socioeconomic History    Marital status:    Tobacco Use    Smoking status: Never     Passive exposure: Never   Vaping Use    Vaping Use: Never used   Substance and Sexual Activity    Alcohol use: No    Drug use: Never    Sexual activity: Yes     Partners: Male     Birth control/protection: None       Review of Systems    Objective     Vitals:    01/19/24 0927   BP: 132/80   Pulse: 104   SpO2: 98%   Weight: 111 kg (244 lb 11.4 oz)   Height: 180.3 cm (70.98\")       Neurologic Exam     Mental Status   Oriented to person, place, and time.   Speech: speech is normal   Level of consciousness: alert  Knowledge: good and consistent with education.   Normal comprehension.     Cranial Nerves   Cranial nerves II through XII intact.     CN II   Visual fields full to confrontation. "   Visual acuity: normal  Right visual field deficit: none  Left visual field deficit: none     CN III, IV, VI   Pupils are equal, round, and reactive to light.  Extraocular motions are normal.   Nystagmus: none   Diplopia: none  Ophthalmoparesis: none  Upgaze: normal  Downgaze: normal  Conjugate gaze: present    CN V   Facial sensation intact.   Right corneal reflex: normal  Left corneal reflex: normal    CN VII   Right facial weakness: none  Left facial weakness: none    CN VIII   Hearing: intact    CN IX, X   Palate: symmetric  Right gag reflex: normal  Left gag reflex: normal    CN XI   Right sternocleidomastoid strength: normal  Left sternocleidomastoid strength: normal    CN XII   Tongue: not atrophic  Fasciculations: absent  Tongue deviation: none    Motor Exam   Muscle bulk: normal  Overall muscle tone: normal    Strength   Strength 5/5 except as noted.   Left deltoid: 4/5  Left biceps: 4/5  Left triceps: 4/5  Left wrist flexion: 4/5  Left wrist extension: 4/5    Sensory Exam   Lowest sensation to pinprick on right: T3  Lowest sensation to pinprick on left: T3    Gait, Coordination, and Reflexes     Gait  Gait: wide-based    Coordination   Finger to nose coordination: abnormal  Heel to shin coordination: abnormal    Tremor   Resting tremor: absent  Intention tremor: absent  Action tremor: left arm    Reflexes   Reflexes 2+ except as noted.       Physical Exam  Eyes:      Extraocular Movements: EOM normal.      Pupils: Pupils are equal, round, and reactive to light.   Neurological:      Mental Status: She is oriented to person, place, and time.      Cranial Nerves: Cranial nerves 2-12 are intact.      Coordination: Finger-Nose-Finger Test abnormal and Heel to Shin Test abnormal.   Psychiatric:         Speech: Speech normal.         Admission on 01/13/2024, Discharged on 01/18/2024   Component Date Value Ref Range Status    HS Troponin T 01/13/2024 6  <14 ng/L Final    QT Interval 01/13/2024 408  ms Final     QTC Interval 01/13/2024 443  ms Final    Glucose 01/13/2024 112 (H)  65 - 99 mg/dL Final    BUN 01/13/2024 18  6 - 20 mg/dL Final    Creatinine 01/13/2024 0.70  0.57 - 1.00 mg/dL Final    Sodium 01/13/2024 141  136 - 145 mmol/L Final    Potassium 01/13/2024 3.7  3.5 - 5.2 mmol/L Final    Slight hemolysis detected by analyzer. Result may be falsely elevated.    Chloride 01/13/2024 104  98 - 107 mmol/L Final    CO2 01/13/2024 27.0  22.0 - 29.0 mmol/L Final    Calcium 01/13/2024 8.4 (L)  8.6 - 10.5 mg/dL Final    Total Protein 01/13/2024 7.6  6.0 - 8.5 g/dL Final    Albumin 01/13/2024 4.2  3.5 - 5.2 g/dL Final    ALT (SGPT) 01/13/2024 59 (H)  1 - 33 U/L Final    AST (SGOT) 01/13/2024 48 (H)  1 - 32 U/L Final    Alkaline Phosphatase 01/13/2024 68  39 - 117 U/L Final    Total Bilirubin 01/13/2024 0.7  0.0 - 1.2 mg/dL Final    Globulin 01/13/2024 3.4  gm/dL Final    Calculated Result    A/G Ratio 01/13/2024 1.2  g/dL Final    BUN/Creatinine Ratio 01/13/2024 25.7 (H)  7.0 - 25.0 Final    Anion Gap 01/13/2024 10.0  5.0 - 15.0 mmol/L Final    eGFR 01/13/2024 108.8  >60.0 mL/min/1.73 Final    Extra Tube 01/13/2024 Hold for add-ons.   Final    Auto resulted.    Extra Tube 01/13/2024 hold for add-on   Final    Auto resulted    Extra Tube 01/13/2024 Hold for add-ons.   Final    Auto resulted.    Extra Tube 01/13/2024 Hold for add-ons.   Final    Auto resulted.    Extra Tube 01/13/2024 Hold for add-ons.   Final    Auto resulted    WBC 01/13/2024 14.47 (H)  3.40 - 10.80 10*3/mm3 Final    RBC 01/13/2024 4.75  3.77 - 5.28 10*6/mm3 Final    Hemoglobin 01/13/2024 13.9  12.0 - 15.9 g/dL Final    Hematocrit 01/13/2024 41.6  34.0 - 46.6 % Final    MCV 01/13/2024 87.6  79.0 - 97.0 fL Final    MCH 01/13/2024 29.3  26.6 - 33.0 pg Final    MCHC 01/13/2024 33.4  31.5 - 35.7 g/dL Final    RDW 01/13/2024 12.5  12.3 - 15.4 % Final    RDW-SD 01/13/2024 40.2  37.0 - 54.0 fl Final    MPV 01/13/2024 9.5  6.0 - 12.0 fL Final    Platelets 01/13/2024  321  140 - 450 10*3/mm3 Final    Neutrophil % 01/13/2024 66.5  42.7 - 76.0 % Final    Lymphocyte % 01/13/2024 26.2  19.6 - 45.3 % Final    Monocyte % 01/13/2024 6.5  5.0 - 12.0 % Final    Eosinophil % 01/13/2024 0.1 (L)  0.3 - 6.2 % Final    Basophil % 01/13/2024 0.1  0.0 - 1.5 % Final    Immature Grans % 01/13/2024 0.6 (H)  0.0 - 0.5 % Final    Neutrophils, Absolute 01/13/2024 9.63 (H)  1.70 - 7.00 10*3/mm3 Final    Lymphocytes, Absolute 01/13/2024 3.79 (H)  0.70 - 3.10 10*3/mm3 Final    Monocytes, Absolute 01/13/2024 0.94 (H)  0.10 - 0.90 10*3/mm3 Final    Eosinophils, Absolute 01/13/2024 0.01  0.00 - 0.40 10*3/mm3 Final    Basophils, Absolute 01/13/2024 0.02  0.00 - 0.20 10*3/mm3 Final    Immature Grans, Absolute 01/13/2024 0.08 (H)  0.00 - 0.05 10*3/mm3 Final    nRBC 01/13/2024 0.0  0.0 - 0.2 /100 WBC Final    Color, UA 01/13/2024 Yellow  Yellow, Straw Final    Appearance, UA 01/13/2024 Clear  Clear Final    pH, UA 01/13/2024 6.5  5.0 - 8.0 Final    Specific Gravity, UA 01/13/2024 1.029  1.001 - 1.030 Final    Glucose, UA 01/13/2024 Negative  Negative Final    Ketones, UA 01/13/2024 Negative  Negative Final    Bilirubin, UA 01/13/2024 Negative  Negative Final    Blood, UA 01/13/2024 Negative  Negative Final    Protein, UA 01/13/2024 30 mg/dL (1+) (A)  Negative Final    Leuk Esterase, UA 01/13/2024 Negative  Negative Final    Nitrite, UA 01/13/2024 Negative  Negative Final    Urobilinogen, UA 01/13/2024 1.0 E.U./dL  0.2 - 1.0 E.U./dL Final    HS Troponin T 01/13/2024 8  <14 ng/L Final    HCG Quantitative 01/13/2024 <0.10  mIU/mL Final    RBC, UA 01/13/2024 11-20 (A)  None Seen, 0-2 /HPF Final    WBC, UA 01/13/2024 3-5 (A)  None Seen, 0-2 /HPF Final    Urine culture not indicated.    Bacteria, UA 01/13/2024 Trace  None Seen, Trace /HPF Final    Squamous Epithelial Cells, UA 01/13/2024 0-2  None Seen, 0-2 /HPF Final    Hyaline Casts, UA 01/13/2024 0-6  0 - 6 /LPF Final    Methodology 01/13/2024 Automated Microscopy    Final    Glucose 01/13/2024 120  70 - 130 mg/dL Final    BUN 01/13/2024 23  8 - 26 mg/dL Final    Creatinine 01/13/2024 1.00  0.60 - 1.30 mg/dL Final    Sodium 01/13/2024 137 (L)  138 - 146 mmol/L Final    POC Potassium 01/13/2024 4.5  3.5 - 4.9 mmol/L Final    Chloride 01/13/2024 105  98 - 109 mmol/L Final    Total CO2 01/13/2024 21 (L)  24 - 29 mmol/L Final    Hemoglobin 01/13/2024 11.2 (L)  12.0 - 17.0 g/dL Final    Serial Number: [id number]:  [id number]    Hematocrit 01/13/2024 33 (L)  38 - 51 % Final    Ionized Calcium 01/13/2024 1.16 (L)  1.20 - 1.32 mmol/L Final    eGFR 01/13/2024 70.9  >60.0 mL/min/1.73 Final    Vitamin B-12 01/13/2024 440  211 - 946 pg/mL Final    Folate 01/13/2024 12.10  4.78 - 24.20 ng/mL Final    C-Reactive Protein 01/13/2024 <0.30  0.00 - 0.50 mg/dL Final    NMO IgG Autoantibodies 01/14/2024 <1.5  0.0 - 3.0 U/mL Final                                 Negative:      0.0 - 3.0                               Positive:           >3.0    Angiotensin Converting Enzyme 01/14/2024 56  14 - 82 U/L Final    RPR 01/14/2024 Reactive (A)  Non-Reactive Final    Anticardiolipin IgG 01/14/2024 <9  0 - 14 GPL U/mL Final                              Negative:              <15                            Indeterminate:     15 - 20                            Low-Med Positive: >20 - 80                            High Positive:         >80    Anticardiolipin IgM 01/14/2024 <9  0 - 12 MPL U/mL Final                              Negative:              <13                            Indeterminate:     13 - 20                            Low-Med Positive: >20 - 80                            High Positive:         >80    HIV-1/ HIV-2 01/14/2024 Non-Reactive  Non-Reactive Final    25 Hydroxy, Vitamin D 01/14/2024 27.7 (L)  30.0 - 100.0 ng/ml Final    Lyme Total Antibody EIA 01/14/2024 Negative  Negative Final    Lyme antibodies not detected. Reflex testing is not indicated.  No laboratory evidence of infection with  B. burgdorferi (Lyme disease).  Negative results may occur in patients recently infected (less than  or equal to 14 days) with B. burgdorferi.  If recent infection is  suspected, repeat testing on a new sample collected in 7 to 14 days is  recommended.    MOG Antibody, Cell-based IFA 01/14/2024 Negative  Negative Final    Treponema pallidum Antibodies 01/14/2024 Non Reactive  Non Reactive Final    RPR Titer 01/14/2024 Pos 1:4 (A)  Negative Final    Glucose, CSF 01/16/2024 105 (H)  40 - 70 mg/dL Final    Protein, Total (CSF) 01/16/2024 19.6  15.0 - 45.0 mg/dL Final    VDRL, Quantitative, CSF 01/16/2024 Non Reactive  Non María:<1:1 Final    WBC, CSF 01/16/2024 3  0 - 5 /mm3 Final    RBC, CSF 01/16/2024 20 (H)  0 - 5 /mm3 Final    Color, CSF 01/16/2024 Colorless  Colorless Final    Supernatant Color, CSF 01/16/2024 Colorless  Colorless Final    Appearance, CSF 01/16/2024 Clear  Clear Final    Volume, CSF 01/16/2024 8.0  mL Final    Tube Number, CSF 01/16/2024 1   Final    WBC, CSF 01/16/2024 0  0 - 5 /mm3 Final    RBC, CSF 01/16/2024 1  0 - 5 /mm3 Final    Color, CSF 01/16/2024 Colorless  Colorless Final    Supernatant Color, CSF 01/16/2024 Colorless  Colorless Final    Appearance, CSF 01/16/2024 Clear  Clear Final    Volume, CSF 01/16/2024 8.0  mL Final    Tube Number, CSF 01/16/2024 4   Final    Glucose 01/17/2024 132 (H)  65 - 99 mg/dL Final    BUN 01/17/2024 23 (H)  6 - 20 mg/dL Final    Creatinine 01/17/2024 0.73  0.57 - 1.00 mg/dL Final    Sodium 01/17/2024 140  136 - 145 mmol/L Final    Potassium 01/17/2024 3.7  3.5 - 5.2 mmol/L Final    Slight hemolysis detected by analyzer. Result may be falsely elevated.    Chloride 01/17/2024 105  98 - 107 mmol/L Final    CO2 01/17/2024 25.0  22.0 - 29.0 mmol/L Final    Calcium 01/17/2024 8.2 (L)  8.6 - 10.5 mg/dL Final    Total Protein 01/17/2024 6.4  6.0 - 8.5 g/dL Final    Albumin 01/17/2024 3.7  3.5 - 5.2 g/dL Final    ALT (SGPT) 01/17/2024 45 (H)  1 - 33 U/L Final    AST  (SGOT) 01/17/2024 24  1 - 32 U/L Final    Alkaline Phosphatase 01/17/2024 78  39 - 117 U/L Final    Total Bilirubin 01/17/2024 0.5  0.0 - 1.2 mg/dL Final    Globulin 01/17/2024 2.7  gm/dL Final    Calculated Result    A/G Ratio 01/17/2024 1.4  g/dL Final    BUN/Creatinine Ratio 01/17/2024 31.5 (H)  7.0 - 25.0 Final    Anion Gap 01/17/2024 10.0  5.0 - 15.0 mmol/L Final    eGFR 01/17/2024 103.5  >60.0 mL/min/1.73 Final    WBC 01/17/2024 19.01 (H)  3.40 - 10.80 10*3/mm3 Final    RBC 01/17/2024 4.71  3.77 - 5.28 10*6/mm3 Final    Hemoglobin 01/17/2024 13.9  12.0 - 15.9 g/dL Final    Hematocrit 01/17/2024 39.9  34.0 - 46.6 % Final    MCV 01/17/2024 84.7  79.0 - 97.0 fL Final    MCH 01/17/2024 29.5  26.6 - 33.0 pg Final    MCHC 01/17/2024 34.8  31.5 - 35.7 g/dL Final    RDW 01/17/2024 12.4  12.3 - 15.4 % Final    RDW-SD 01/17/2024 38.0  37.0 - 54.0 fl Final    MPV 01/17/2024 9.7  6.0 - 12.0 fL Final    Platelets 01/17/2024 341  140 - 450 10*3/mm3 Final    Neutrophil % 01/17/2024 73.0  42.7 - 76.0 % Final    Lymphocyte % 01/17/2024 14.9 (L)  19.6 - 45.3 % Final    Monocyte % 01/17/2024 8.1  5.0 - 12.0 % Final    Eosinophil % 01/17/2024 0.0 (L)  0.3 - 6.2 % Final    Basophil % 01/17/2024 0.2  0.0 - 1.5 % Final    Immature Grans % 01/17/2024 3.8 (H)  0.0 - 0.5 % Final    Neutrophils, Absolute 01/17/2024 13.88 (H)  1.70 - 7.00 10*3/mm3 Final    Lymphocytes, Absolute 01/17/2024 2.83  0.70 - 3.10 10*3/mm3 Final    Monocytes, Absolute 01/17/2024 1.54 (H)  0.10 - 0.90 10*3/mm3 Final    Eosinophils, Absolute 01/17/2024 0.00  0.00 - 0.40 10*3/mm3 Final    Basophils, Absolute 01/17/2024 0.04  0.00 - 0.20 10*3/mm3 Final    Immature Grans, Absolute 01/17/2024 0.72 (H)  0.00 - 0.05 10*3/mm3 Final    nRBC 01/17/2024 0.0  0.0 - 0.2 /100 WBC Final    Hep B C IgM 01/17/2024 Non-Reactive  Non-Reactive Final    Hep B S Ab 01/17/2024 Non-Reactive  Non-Reactive Final    Hepatitis B Surface Ag 01/17/2024 Non-Reactive  Non-Reactive Final          Assessment & Plan     Problem List Items Addressed This Visit          Neuro    Multiple sclerosis - Primary (Chronic)    Current Assessment & Plan     Start [facility]              Relevant Orders    Ambulatory Referral to Occupational Therapy    Ambulatory Referral to Physical Therapy Evaluate and treat    Neuropathic pain    Current Assessment & Plan     Start [facility]                No follow-ups on file.

## 2024-01-19 NOTE — OUTREACH NOTE
Prep Survey      Flowsheet Row Responses   [facility] facility patient discharged from? [facility]   Is LACE score < 7 ? No   Eligibility Readm Mgmt   Discharge diagnosis Multiple sclerosis exacerbation   Does the patient have one of the following disease processes/diagnoses(primary or secondary)? Other   Does the patient have Home health ordered? No   Is there a DME ordered? No   Prep survey completed? Yes            CELESTE YOUNG - Registered Nurse

## 2024-01-20 LAB — T PALLIDUM AB CSF QL IF: NON REACTIVE

## 2024-01-23 ENCOUNTER — READMISSION MANAGEMENT (OUTPATIENT)
Dept: CALL CENTER | Facility: HOSPITAL | Age: 44
End: 2024-01-23
Payer: COMMERCIAL

## 2024-01-25 ENCOUNTER — PATIENT ROUNDING (BHMG ONLY) (OUTPATIENT)
Dept: NEUROLOGY | Facility: CLINIC | Age: 44
End: 2024-01-25
Payer: COMMERCIAL

## 2024-01-30 ENCOUNTER — TREATMENT (OUTPATIENT)
Dept: PHYSICAL THERAPY | Facility: CLINIC | Age: 44
End: 2024-01-30
Payer: COMMERCIAL

## 2024-01-30 DIAGNOSIS — G35 MULTIPLE SCLEROSIS: Primary | ICD-10-CM

## 2024-01-30 PROCEDURE — 97162 PT EVAL MOD COMPLEX 30 MIN: CPT | Performed by: PHYSICAL THERAPIST

## 2024-01-30 NOTE — PROGRESS NOTES
"    Physical Therapy Initial Evaluation and Plan of Care/Discharge    Patient: Starr Armenta   : 1980  Diagnosis/ICD-10 Code:  Multiple sclerosis [G35]  Referring practitioner: Panda Márquez MD  Date of Initial Visit: 2024  Today's Date: 2024  Patient seen for 1 session         Visit Diagnoses:    ICD-10-CM ICD-9-CM   1. Multiple sclerosis  G35 340         ESPOSITO/56  TU seconds, 7 seconds      Subjective Evaluation    History of Present Illness  Onset date: 23 years ago.  Mechanism of injury: Pt reports she was diagnosed with MS approximately 23 years ago. She states she hasn't had \"any real flare ups until a couple weeks ago\". The patient reports she was hospitalized for one week. She states she began experiencing numbness on the left side, extending into the torso, then to the right side. The patient reports the greatest numbness was on the left hand and arm. She states she had a little difficulty with walking due to difficulty feeling the floor with her feet. She also reports a lot of fatigue. The patient reports an MRI, CT scan, and lumbar puncture were performed. She states she was diagnosed with an \"active lesion on the spine at C3-C4\". The patient reports she was placed on a daily steroid, underwent infusion, and is now on ongoing injection medication. She states symptoms have improved since discharge and new medication. The patient reports following discharge she was seen by a neurologist, who placed her on a treatment plan and referred to physical therapy. The patient reports she is doing a lot better, but has the greatest difficulty typing due to decreased sensation in the hand. She states she has to type at work, and is currently typing with one hand. The patient reports she is not 100% with walking, but is still independent. She states she is able to grocery shop without issue, as well as attending her children's sporting events.      Patient Occupation: . " Quality of life: good    Pain  Current pain ratin  At best pain ratin  At worst pain ratin  Aggravating factors: ambulation, keyboarding and standing    Social Support  Lives in: multiple-level home  Lives with: spouse and young children (Mother in-law.)    Hand dominance: right    Diagnostic Tests  Abnormal MRI: See chart review.  Abnormal CT scan: See chart review.    Treatments  Previous treatment: physical therapy  Current treatment: medication  Patient Goals  Patient goals for therapy: improved balance, increased motion, increased strength, independence with ADLs/IADLs and return to sport/leisure activities  Patient goal: Type with bilateral hands for work.         Objective          Neurological Testing     Sensation   Cervical/Thoracic   Left   Diminished: light touch    Right   Intact: light touch    Reflexes   Left   Deltoid (C5): trace (1+)  Biceps (C5/C6): trace (1+)  Patellar (L4): normal (2+)  Achilles (S1): normal (2+)    Right   Deltoid (C5): trace (1+)  Biceps (C5/C6): trace (1+)  Patellar (L4): normal (2+)  Achilles (S1): normal (2+)    Additional Neurological Details  Decreased left C5-T1  Diminished left L3/4    Active Range of Motion   Cervical/Thoracic Spine   Normal active range of motion  Left Shoulder   Normal active range of motion    Right Shoulder   Normal active range of motion    Lumbar   Normal active range of motion    Strength/Myotome Testing     Left Shoulder     Planes of Motion   Flexion: 4+   Abduction: 4+     Isolated Muscles   Biceps: 4+   Triceps: 4+     Right Shoulder     Planes of Motion   Flexion: 4+   Abduction: 4+     Isolated Muscles   Biceps: 4+   Triceps: 4+     Left Wrist/Hand      (2nd hand position)     Trial 1: 95 lbs    Trial 2: 95 lbs    Trial 3: 90 lbs    Average: 93.33 lbs    Right Wrist/Hand      (2nd hand position)     Trial 1: 95 lbs    Trial 2: 110 lbs    Trial 3: 110 lbs    Average: 105 lbs    Left Hip   Planes of Motion   Flexion:  4+  Abduction: 4+    Right Hip   Planes of Motion   Flexion: 4+  Abduction: 4+    Left Knee   Flexion: 4+  Extension: 4+    Right Knee   Flexion: 4+  Extension: 4+    Left Ankle/Foot   Dorsiflexion: 4+    Right Ankle/Foot   Dorsiflexion: 4+    Ambulation     Observational Gait   Gait: within functional limits   Walking speed and stride length within functional limits.           Assessment & Plan       Assessment  Assessment details: The patient is a 43 year old female presenting to the clinic with a diagnosis of multiple sclerosis. The patient demonstrated 4+/5 gross strength. Sensation as decreased on the left as compared to the right, both upper and lower extremity. The patient scored 56/56 on the Souza Balance Scale and performed TUG in 8 seconds and 7 seconds. Due to current level of independence, strength, balance, and gait the patient is not appropriate for skilled physical therapy at this time. Evaluation only was discussed with the patient, with patient in agreement to not continue physical therapy. The patient reported her main concern is in regards to the left hand, with OT scheduled for 2/8/24. She was instructed to contact MD and PT clinic if she notices a decline in strength, gait, or mobility. The patient verbalized understanding.    Plan  Therapy options: will not be seen for skilled therapy services  Treatment plan discussed with: patient  Plan details: Evaluation only.      Timed:         Manual Therapy:         mins  05571;     Therapeutic Exercise:         mins  62864;     Neuromuscular [place]:        mins  31148;    Therapeutic Activity:          mins  94013;     Gait Training:           mins  37718;     Ultrasound:          mins  46463;    Ionto                                   mins   78396  Self Care                            mins   86817      Un-Timed:  Electrical Stimulation:         mins  84112 ( );  Dry Needling          mins self-pay  Traction          mins 98416  Low Eval           Mins  51302  Mod Eval     45     Mins  47770  High Eval                            Mins  87185  Canalith Repos         mins 17239      Timed Treatment:   0   mins   Total Treatment:     45   mins          PT: Ashley Claudene Dalton, PT     License Number: [id number]  Electronically signed by Ashley Claudene Dalton, PT, 01/30/24, 10:59 AM EST    Certification Period: 1/30/2024 thru 4/28/2024  I certify that the therapy services are furnished while this patient is under my care.  The services outlined above are required by this patient, and will be reviewed every 90 days.         Physician Signature:__________________________________________________    PHYSICIAN: Panda Márquez MD  NPI: [id number]                                      DATE:      Please sign and return via fax to [email or web address] . Thank you, [facility].

## 2024-02-01 ENCOUNTER — READMISSION MANAGEMENT (OUTPATIENT)
Dept: CALL CENTER | Facility: HOSPITAL | Age: 44
End: 2024-02-01
Payer: COMMERCIAL

## 2024-02-01 NOTE — OUTREACH NOTE
Medical Week 2 Survey      Flowsheet Row Responses   [facility] patient discharged from? [facility]   Does the patient have one of the following disease processes/diagnoses(primary or secondary)? Other   Week 2 attempt successful? No   Unsuccessful attempts Attempt 1            Kiera LANDEROS - Licensed Nurse

## 2024-02-06 ENCOUNTER — READMISSION MANAGEMENT (OUTPATIENT)
Dept: CALL CENTER | Facility: HOSPITAL | Age: 44
End: 2024-02-06
Payer: COMMERCIAL

## 2024-02-06 NOTE — OUTREACH NOTE
Medical Week 2 Survey      Flowsheet Row Responses   [facility] patient discharged from? [place]   Does the patient have one of the following disease processes/diagnoses(primary or secondary)? Other   Week 2 attempt successful? Yes   Call start time 1607   Discharge diagnosis Multiple sclerosis exacerbation   Call end time 1610   Person spoke with today (if not patient) and relationship spouse   Meds reviewed with patient/caregiver? Yes   Does the patient have all medications ordered at discharge? Yes   Is the patient taking all medications as directed (includes completed medication regime)? Yes   Comments regarding appointments Pt has seen neurology   Does the patient have a primary care provider?  Yes   Does the patient have an appointment with their PCP within 7 days of discharge? Yes   Comments regarding PCP [place]   Has the patient kept scheduled appointments due by today? Yes   Psychosocial issues? No   Comments patient is back to work   Did the patient receive a copy of their discharge instructions? Yes   Nursing interventions Reviewed instructions with patient   Is the patient/caregiver able to teach back the hierarchy of who to call/visit for symptoms/problems? PCP, Specialist, Home health nurse, Urgent Care, ED, 911 Yes   If the patient is a current smoker, are they able to teach back resources for cessation? Not a smoker   Week 2 Call Completed? Yes   Graduated Yes   Is the patient interested in additional calls from an ambulatory ? No   Would this patient benefit from a Referral to [facility] Social Work? No   Graduated/Revoked comments Spouse reports patient is doing well. She has returned to work. no needs.   Wrap up additional comments Spouse reports patient is doing well.  She has returned to work.  no needs.   Call end time 1610            CELESTE YOUNG - Registered Nurse

## 2024-02-08 ENCOUNTER — TREATMENT (OUTPATIENT)
Dept: PHYSICAL THERAPY | Facility: CLINIC | Age: 44
End: 2024-02-08
Payer: COMMERCIAL

## 2024-02-08 DIAGNOSIS — G35 MULTIPLE SCLEROSIS: Primary | ICD-10-CM

## 2024-02-08 DIAGNOSIS — R27.8 OTHER LACK OF COORDINATION: ICD-10-CM

## 2024-02-08 DIAGNOSIS — R20.0 NUMBNESS OF LEFT HAND: ICD-10-CM

## 2024-02-08 PROCEDURE — 97166 OT EVAL MOD COMPLEX 45 MIN: CPT | Performed by: OCCUPATIONAL THERAPIST

## 2024-02-08 PROCEDURE — 97112 NEUROMUSCULAR REEDUCATION: CPT | Performed by: OCCUPATIONAL THERAPIST

## 2024-02-08 NOTE — PROGRESS NOTES
____________________________________________________________________    [facility]  Outpatient Rehabilitation  [address] [place] [address]  Phone: [phone] / Fax: [phone]       Occupational Therapy     Initial Evaluation and Plan of Care             Patient: Starr Armenta   : 1980  Diagnosis/ICD-10 Code:  Multiple sclerosis [G35]  Referring practitioner: Panda Márquez MD  Date of Initial Visit: 2024  Today's Date: 2024  Patient seen for 1 session         Visit Diagnoses:    ICD-10-CM ICD-9-CM   1. Multiple sclerosis  G35 340   2. Other lack of coordination  R27.8 781.3   3. Numbness of left hand  R20.0 782.0           Subjective Evaluation    History of Present Illness  Date of onset: 2024  Mechanism of injury: Pt reports a ~ 23 year Hx of Multiple sclerosis (MS). Pt states that she had a recent flare leading to a brief hospital stay 24 -1/10/24 and then again on 24-24.  Pt reports being referring for therapy services after follow-up with neurologist as well as beginning new medication.  Pt reports that overall she has improved bu continues to have a high level of fatigue as well as numbness on the left side, extending into the torso, then to the right side with greatest area of numbness to left UE. Pt report work at home job in which she is having to type with only right hand unable to currently type with left. Pt denies any prior functional deficits of significance .    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; took off ~ 1 week but back to work Pain  No pain reported    Social Support  Lives in: multiple-level home  Lives with: spouse and young children    Hand dominance: right    Patient Goals  Patient goals for therapy: increased strength, independence with ADLs/IADLs,  increased motion, return to work and decreased pain             Objective          Active Range of Motion   Left Shoulder   Normal active range of motion    Right Shoulder   Normal active range of motion    Left Elbow   Normal active range of motion    Right Elbow   Normal active range of motion    Left Wrist   Normal active range of motion    Right Wrist   Normal active range of motion    Additional Active Range of Motion Details  Pt reports stiff feeling with left UE Apley Scratch Test  WNLs    Strength/Myotome Testing     Left Shoulder   Normal muscle strength    Right Shoulder   Normal muscle strength    Left Elbow   Normal strength    Right Elbow   Normal strength    Left Wrist/Hand   Normal wrist strength     (2nd hand position)     Trial 1: 85 lbs    Trial 2: 75 lbs    Trial 3: 88 lbs    Average: 82.67 lbs    Right Wrist/Hand   Normal wrist strength     (2nd hand position)     Trial 1: 95 lbs    Trial 2: 100 lbs    Trial 3: 86 lbs    Average: 93.67 lbs    Additional Strength Details   Strength Norms:    Right= 62.2  Left= 56    Functional Assessment     Comments  O QuickDASH= TBD     UE Motor Skills:    O Box and Blocks  (Norms: Right=82.1; Left = 78.3)    Right= 65  Left= 50    O Nine Hole Peg Test  (Norms: Right=16.54; Left = 17.64)    Right= 17.28  Left= 27.94    Monofilaments:    Right Fingertips    6.65 positive  4.56 positive  4.31 positive  3.61 finger positive, thumb negative  2.83 negative    Left Fingertips    6.65 positive  4.56 negative  4.31 negative  3.61 negative  2.83 negative                  Assessment & Plan       Assessment  Impairments: abnormal coordination, activity intolerance, lacks appropriate home exercise program and safety issue   Functional limitations: carrying objects, lifting, sleeping, walking, pulling, pushing, uncomfortable because of pain, moving in bed, sitting, standing, stooping, reaching behind back, reaching overhead and unable to perform repetitive tasks    Assessment details: Pt presents to OT following recent MS flare with decreased activity tolerance as well as UE motor and sensory skill, left greater then right but fortunately over UE strength appears to be unaffected and WNLs.  Pt reports difficulty completing tasks with one major concern being unable to type with left hand for work. Pt will benefit from OT treatment to improve functional abilities and compensatory skills to return to prior level of function. Without OT treatment, pt could be at a greater risk of injury and additional functional decline. Pt request 1x per week due to fatigue and work schedule. OT would preferred 2 but considering fatigue levels it would not be inappropriate to begin with 1x per week than adjust frequency as needed and able. OT to treat 1x per week for 12 weeks until all goals or maximal benefit from OT treatment is achieved.  Prognosis: good  Prognosis details: TREATMENT: OT provided pt education on mirror box therapy HEP. Although mirror box therapy is largely associated with pain disorder and CVA there is some literature available to support use with MS. Pt voiced good understanding following education including verbal, written, and OT demonstration.     Goals  Plan Goals: STG - 4 weeks    To improve independence and safety in ADLs and iADLs:     1  Pt will improve Box and Block scoring by 10%  2  Pt will improve 9-hole peg test scores by 10%  3  Pt will be independent with beginning HEP and able to demonstrate in clinic.  4  Pt will demonstrate fair understanding of compensatory skills and/or adaptive equipment as needed.  5 Pt will complete  10 minutes of moderate activity without rest breaks.       LTG - 12 weeks    To improve independence and safety in ADLs and iADLs:      1  Pt will improve Box and Block scoring to >/= 90% of norm  2  Pt will improve 9-hole peg test scores to >/= 90% of norm  3  Pt will be independent with complete HEP and able to demonstrate in  clinic.  4  Pt will demonstrate good understanding of compensatory skills and/or adaptive equipment as needed.  5 Pt will complete 20-30 minutes of moderate activity without rest breaks.    Plan  Frequency: 1x week  Duration in weeks: 12  Treatment plan discussed with: patient  Plan details: OT to treat 1x per week for 12 weeks until all goals or maximal benefit from OT treatment is achieved.          History # of Personal Factors and/or Comorbidities: MODERATE (1-2)  Examination of Body System(s): # of elements: MODERATE (3)  Clinical Presentation: STABLE   Clinical Decision Making: MODERATE      Timed:         Manual Therapy:    0     mins  78856;     Therapeutic Exercise:    0     mins  59633;     Neuromuscular Helena:    13    mins  40646;    Therapeutic Activity:     0     mins  15623;     Ultrasound:     0     mins  06059;    Ionto                               0    mins   53917  Self Care                       0     mins   70841  Electrical Stimulation:    0     mins  05937    Un-Timed:  Electrical Stimulation:    0     mins  85091 ( );    Low Eval     0     Mins  62483  Mod Eval     43     Mins  92644  High Eval                       0     Mins  28872        Timed Treatment:   13   mins   Total Treatment:     56   mins            OT SIGNATURE:       Occupational Therapist, Certified Hand Therapist    [place] License #[id number]  NPI #[id number]  Electronically signed by: Kenny D. Maynes, CELINE/L, CHT, CKTP, CEAS 2/8/2024                  Certification Period: 2/8/2024 thru 5/7/2024    I certify that the therapy services are furnished while this patient is under my care.  The services outlined above are required by this patient, and will be reviewed every 90 days.         Physician Signature:__________________________________________________    PHYSICIAN: Panda Márquez MD  NPI: [id number]                                      DATE:      Please sign and return via fax to [phone] . Thank you, [facility].

## 2024-02-15 ENCOUNTER — TREATMENT (OUTPATIENT)
Dept: PHYSICAL THERAPY | Facility: CLINIC | Age: 44
End: 2024-02-15
Payer: COMMERCIAL

## 2024-02-15 DIAGNOSIS — R20.0 NUMBNESS OF LEFT HAND: ICD-10-CM

## 2024-02-15 DIAGNOSIS — R27.8 OTHER LACK OF COORDINATION: ICD-10-CM

## 2024-02-15 DIAGNOSIS — G35 MULTIPLE SCLEROSIS: Primary | ICD-10-CM

## 2024-02-15 PROCEDURE — 97112 NEUROMUSCULAR REEDUCATION: CPT | Performed by: OCCUPATIONAL THERAPIST

## 2024-02-15 PROCEDURE — 97535 SELF CARE MNGMENT TRAINING: CPT | Performed by: OCCUPATIONAL THERAPIST

## 2024-02-15 PROCEDURE — 97530 THERAPEUTIC ACTIVITIES: CPT | Performed by: OCCUPATIONAL THERAPIST

## 2024-02-22 ENCOUNTER — TREATMENT (OUTPATIENT)
Dept: PHYSICAL THERAPY | Facility: CLINIC | Age: 44
End: 2024-02-22
Payer: COMMERCIAL

## 2024-02-22 DIAGNOSIS — G35 MULTIPLE SCLEROSIS: Primary | ICD-10-CM

## 2024-02-22 DIAGNOSIS — R20.0 NUMBNESS OF LEFT HAND: ICD-10-CM

## 2024-02-22 DIAGNOSIS — R27.8 OTHER LACK OF COORDINATION: ICD-10-CM

## 2024-02-22 PROCEDURE — 97112 NEUROMUSCULAR REEDUCATION: CPT | Performed by: OCCUPATIONAL THERAPIST

## 2024-02-22 PROCEDURE — 97530 THERAPEUTIC ACTIVITIES: CPT | Performed by: OCCUPATIONAL THERAPIST

## 2024-02-22 PROCEDURE — 97110 THERAPEUTIC EXERCISES: CPT | Performed by: OCCUPATIONAL THERAPIST

## 2024-02-22 NOTE — PROGRESS NOTES
____________________________________________________________________    [facility]  Outpatient Rehabilitation  [address] [place] [address]  Phone: [phone] / Fax: [phone]       Occupational Therapy     Treatment Note            Patient: Starr Armenta   : 1980  Diagnosis/ICD-10 Code:  Multiple sclerosis [G35]  Referring practitioner: Panda Márquez MD  Date of Initial Visit: Type: THERAPY  Noted: 2024  Today's Date: 2024  Patient seen for 3 sessions    Visit Diagnoses:    ICD-10-CM ICD-9-CM   1. Multiple sclerosis  G35 340   2. Other lack of coordination  R27.8 781.3   3. Numbness of left hand  R20.0 782.0              Subjective Evaluation    History of Present Illness    Subjective comment: Pt reports no new concerns. Pt reports good HEP adherece.           Objective   See Exercise, Manual, and Modality Logs for complete treatment.           Assessment & Plan       Assessment  Assessment details: OT/CHT provided Pt with min/mod cues and instructions, including technique and pacing, to complete therapist modified interventions focused on left UE function.    Pt engaged in mirror box therapy for left hand sensor changes and numbness. Pt completed wrist stretching to improve nerve gliding and maintain joint and soft tissue motion. Pt provided vib massage to improve muscle stiffness and provide sensory input. Pt reports some sensory return of upper arm.      Pt demonstrated good effort and motivation. Pt will benefit from continued OT treatment.       Prognosis: good    Plan  Planned modality interventions: ultrasound, thermotherapy (hydrocollator packs), thermotherapy (paraffin bath), TENS, microcurrent electrical stimulation, electrical stimulation/Russian stimulation, high voltage pulsed current (pain management) and cryotherapy  Planned therapy interventions: therapeutic activities, strengthening, soft tissue mobilization, manual therapy, motor  coordination training, neuromuscular re-education, orthotic fitting/training, joint mobilization, IADL retraining, home exercise program, functional ROM exercises, flexibility, fine motor coordination training, body mechanics training, ADL retraining and stretching  Frequency: 1x week  Treatment plan discussed with: patient        Progress per Plan of Care               Timed Codes        Manual Therapy:    0     mins  14708;    Therapeutic Exercise:    11     mins  33169;     Neuromuscular Helena:    27    mins  33351;    Therapeutic Activity:     12    mins  52686;      Ultrasound:     0     mins  36738;    Community/ work    0     mins  27487  Self Care/ Rodolfo    0     mins  84768  Sensory Re-Int.             0     mins   32339  Cognitve Re-train    0     mins  30210   Electrical Stimulation:    0     mins 00214      Un-timed Codes  Electrical Stimulation:    0     mins 09178 ( );      Timed Treatment:   50   mins   Total Treatment:     50   mins       OT SIGNATURE:       Occupational Therapist, Certified Hand Therapist    [place] License #[id number]  NPI #[id number]  Electronically signed by: Kenny D. Maynes, CELINE/L, CHT, CKTP, CEAS 2/22/2024

## 2024-02-29 ENCOUNTER — TREATMENT (OUTPATIENT)
Dept: PHYSICAL THERAPY | Facility: CLINIC | Age: 44
End: 2024-02-29
Payer: COMMERCIAL

## 2024-02-29 DIAGNOSIS — R27.8 OTHER LACK OF COORDINATION: ICD-10-CM

## 2024-02-29 DIAGNOSIS — G35 MULTIPLE SCLEROSIS: Primary | ICD-10-CM

## 2024-02-29 DIAGNOSIS — R20.0 NUMBNESS OF LEFT HAND: ICD-10-CM

## 2024-02-29 PROCEDURE — 97112 NEUROMUSCULAR REEDUCATION: CPT | Performed by: OCCUPATIONAL THERAPIST

## 2024-02-29 PROCEDURE — 97530 THERAPEUTIC ACTIVITIES: CPT | Performed by: OCCUPATIONAL THERAPIST

## 2024-02-29 NOTE — PROGRESS NOTES
____________________________________________________________________    [facility]  Outpatient Rehabilitation  [address] [place] [address]  Phone: [phone] / Fax: [phone]       Occupational Therapy     Treatment Note            Patient: Starr Armenta   : 1980  Diagnosis/ICD-10 Code:  Multiple sclerosis [G35]  Referring practitioner: Panda Márquez MD  Date of Initial Visit: Type: THERAPY  Noted: 2024  Today's Date: 2024  Patient seen for 4 sessions    Visit Diagnoses:    ICD-10-CM ICD-9-CM   1. Multiple sclerosis  G35 340   2. Other lack of coordination  R27.8 781.3   3. Numbness of left hand  R20.0 782.0              Subjective Evaluation    History of Present Illness    Subjective comment: Pt reports no new concerns. Pt reports good HEP adherece. Pt states everything has improved and near normal expect hand which continues to slowly improve.           Objective   See Exercise, Manual, and Modality Logs for complete treatment.           Assessment & Plan       Assessment  Assessment details: OT/CHT provided Pt with min/mod cues and instructions, including technique and pacing, to complete therapist modified interventions focused on left UE function.    Pt engaged in mirror box therapy for left hand sensory changes and numbness. Pt provided vib massage and flex  massage to improve muscle stiffness and provide sensory input. Pt completed clothespin activities to improve strength and compensatory skills. Pt reports some improvement in typing with left hand.      Pt demonstrated good effort and motivation. Pt will benefit from continued OT treatment.  Prognosis: good    Plan  Planned modality interventions: ultrasound, thermotherapy (hydrocollator packs), thermotherapy (paraffin bath), TENS, microcurrent electrical stimulation, electrical stimulation/Russian stimulation, high voltage pulsed current (pain management) and cryotherapy  Planned  therapy interventions: therapeutic activities, strengthening, soft tissue mobilization, manual therapy, motor coordination training, neuromuscular re-education, orthotic fitting/training, joint mobilization, IADL retraining, home exercise program, functional ROM exercises, flexibility, fine motor coordination training, body mechanics training, ADL retraining and stretching  Frequency: 1x week  Treatment plan discussed with: patient        Progress per Plan of Care               Timed Codes        Manual Therapy:    0     mins  75085;    Therapeutic Exercise:    0     mins  51565;     Neuromuscular Helena:    29    mins  80846;    Therapeutic Activity:     24    mins  75702;      Ultrasound:     0     mins  52814;    Community/ work    0     mins  52957  Self Care/ Rodolfo    0     mins  85771  Sensory Re-Int.             0     mins   53471  Cognitve Re-train    0     mins  12503   Electrical Stimulation:    0     mins 48179      Un-timed Codes  Electrical Stimulation:    0     mins 93213 ( );      Timed Treatment:   53   mins   Total Treatment:     53   mins       OT SIGNATURE:       Occupational Therapist, Certified Hand Therapist    [place] License #[id number]  NPI #[id number]  Electronically signed by: Kenny D. Maynes, OTR/L, MARY JANE, RADHA, SUSANNAH 2/29/2024

## 2024-03-07 ENCOUNTER — TREATMENT (OUTPATIENT)
Dept: PHYSICAL THERAPY | Facility: CLINIC | Age: 44
End: 2024-03-07
Payer: COMMERCIAL

## 2024-03-07 DIAGNOSIS — R20.0 NUMBNESS OF LEFT HAND: ICD-10-CM

## 2024-03-07 DIAGNOSIS — G35 MULTIPLE SCLEROSIS: Primary | ICD-10-CM

## 2024-03-07 DIAGNOSIS — R27.8 OTHER LACK OF COORDINATION: ICD-10-CM

## 2024-03-07 PROCEDURE — 97112 NEUROMUSCULAR REEDUCATION: CPT | Performed by: OCCUPATIONAL THERAPIST

## 2024-03-07 PROCEDURE — 97530 THERAPEUTIC ACTIVITIES: CPT | Performed by: OCCUPATIONAL THERAPIST

## 2024-03-07 NOTE — PROGRESS NOTES
____________________________________________________________________    [facility]  Outpatient Rehabilitation  [address] [place] [address]  Phone: [phone] / Fax: [phone]       Occupational Therapy     Progress Note             Patient: Starr Armenta   : 1980  Diagnosis/ICD-10 Code:  Multiple sclerosis [G35]  Referring practitioner: Panda Márquez MD  Date of Initial Visit: 3/7/2024  Today's Date: 3/7/2024  Patient seen for 5 session         Visit Diagnoses:    ICD-10-CM ICD-9-CM   1. Multiple sclerosis  G35 340   2. Other lack of coordination  R27.8 781.3   3. Numbness of left hand  R20.0 782.0           Subjective Evaluation    History of Present Illness  Date of onset: 2024    Subjective comment: Pt reports no new concerns. Pt states I can tell it is getting better.Pain  No pain reported    Social Support  Lives in: multiple-level home  Lives with: spouse and young children    Hand dominance: right    Patient Goals  Patient goals for therapy: increased strength, independence with ADLs/IADLs, increased motion, return to work and decreased pain             Objective          Active Range of Motion   Left Shoulder   Normal active range of motion    Right Shoulder   Normal active range of motion    Left Elbow   Normal active range of motion    Right Elbow   Normal active range of motion    Left Wrist   Normal active range of motion    Right Wrist   Normal active range of motion    Additional Active Range of Motion Details  Pt reports stiff feeling with left UE Apley Scratch Test  WNLs    Strength/Myotome Testing     Left Shoulder   Normal muscle strength    Right Shoulder   Normal muscle strength    Left Elbow   Normal strength    Right Elbow   Normal strength    Left Wrist/Hand   Normal wrist strength    Right Wrist/Hand   Normal wrist strength    Functional Assessment     Comments  O Box and Blocks  (Norms: Right=82.1; Left = 78.3)    Right= 65 >  63  Left= 50 > 55    O Nine Hole Peg Test  (Norms: Right=16.54; Left = 17.64)    Right= 17.28 > 14.73  Left= 27.94 > 24.70    O Monofilaments:    Left Fingertips    6.65 positive > positive   4.56 negative > positive   4.31 negative > positive   3.61 negative > IF and SF positive ; thumb, LF, and RF negative  2.83 negative > negative     O QuickDASH Score: 4.5 / 100 = 4.5 %    O QuickDASH work total 12.5%, (raw score 6)               Assessment & Plan       Assessment  Impairments: abnormal coordination, activity intolerance, lacks appropriate home exercise program and safety issue   Functional limitations: carrying objects, lifting, sleeping, walking, pulling, pushing, uncomfortable because of pain, moving in bed, sitting, standing, stooping, reaching behind back, reaching overhead and unable to perform repetitive tasks   Assessment details: OT/CHT provided Pt with minimal  cues and instructions, including technique and pacing, to complete therapist modified interventions focused on progress testing and education, as well as left UE function.     Pt demonstrates improved motor skills with increased scoring with motor skills testing. Also, pt with significant return on left fingertips monofilament testing, improving from Loss of protective sensation to diminished light touch.       Pt will benefit from continued OT/hand therapy treatment at this time. Pt demonstrates excellent effort and motivation.   Prognosis: good    Goals  Plan Goals: STG - 4 weeks    To improve independence and safety in ADLs and iADLs:     1  Pt will improve Box and Block scoring by 10%  >progressing  2  Pt will improve 9-hole peg test scores by 10%  >progressing  3  Pt will be independent with beginning HEP and able to demonstrate in clinic.  >met  4  Pt will demonstrate fair understanding of compensatory skills and/or adaptive equipment as needed.  >met  5 Pt will complete 10 minutes of moderate activity without rest breaks.  >ongoing        LTG - 12 weeks    To improve independence and safety in ADLs and iADLs:    1  Pt will improve Box and Block scoring to >/= 90% of norm  2  Pt will improve 9-hole peg test scores to >/= 90% of norm  3  Pt will be independent with complete HEP and able to demonstrate in clinic.  4  Pt will demonstrate good understanding of compensatory skills and/or adaptive equipment as needed.  5 Pt will complete 20-30 minutes of moderate activity without rest breaks.    Plan  Planned modality interventions: ultrasound, thermotherapy (paraffin bath), thermotherapy (hydrocollator packs), TENS, microcurrent electrical stimulation, high voltage pulsed current (pain management), electrical stimulation/Russian stimulation and cryotherapy  Planned therapy interventions: therapeutic activities, stretching, strengthening, soft tissue mobilization, orthotic fitting/training, neuromuscular re-education, motor coordination training, manual therapy, joint mobilization, IADL retraining, home exercise program, functional ROM exercises, flexibility, fine motor coordination training, body mechanics training, balance/weight-bearing training and ADL retraining  Frequency: 1x week  Duration in weeks: 12  Treatment plan discussed with: patient        Timed:         Manual Therapy:    0     mins  16073;     Therapeutic Exercise:    0     mins  36411;     Neuromuscular Helena:    23    mins  16676;    Therapeutic Activity:     30     mins  03179;     Ultrasound:     0     mins  58764;    Ionto                               0    mins   04840  Self Care                       0     mins   33759  Electrical Stimulation:    0     mins  23158    Un-Timed:  Electrical Stimulation:    0     mins  02352 ( );        Timed Treatment:   53   mins   Total Treatment:     53   mins            OT SIGNATURE:       Occupational Therapist, Certified Hand Therapist    [place] License #[id number]  NPI #[id number]  Electronically signed by: Kenny D. Maynes, OTR/JENNY, WENDIT, CKTP,  CEAS 3/7/2024

## 2024-03-14 ENCOUNTER — TREATMENT (OUTPATIENT)
Dept: PHYSICAL THERAPY | Facility: CLINIC | Age: 44
End: 2024-03-14
Payer: COMMERCIAL

## 2024-03-14 DIAGNOSIS — G35 MULTIPLE SCLEROSIS: ICD-10-CM

## 2024-03-14 DIAGNOSIS — R20.0 NUMBNESS OF LEFT HAND: Primary | ICD-10-CM

## 2024-03-14 DIAGNOSIS — R27.8 OTHER LACK OF COORDINATION: ICD-10-CM

## 2024-03-14 PROCEDURE — 97530 THERAPEUTIC ACTIVITIES: CPT | Performed by: OCCUPATIONAL THERAPIST

## 2024-03-14 PROCEDURE — 97112 NEUROMUSCULAR REEDUCATION: CPT | Performed by: OCCUPATIONAL THERAPIST

## 2024-03-14 NOTE — PROGRESS NOTES
____________________________________________________________________    [facility]  Outpatient Rehabilitation  [address] [place] [address]  Phone: [phone] / Fax: [phone]       Occupational Therapy     Treatment Note            Patient: Starr Armenta   : 1980  Diagnosis/ICD-10 Code:  Numbness of left hand [R20.0]  Referring practitioner: Panda Márquez MD  Date of Initial Visit: Type: THERAPY  Noted: 2024  Today's Date: 3/14/2024  Patient seen for 6 sessions    Visit Diagnoses:    ICD-10-CM ICD-9-CM   1. Numbness of left hand  R20.0 782.0   2. Other lack of coordination  R27.8 781.3   3. Multiple sclerosis  G35 340              Subjective Evaluation    History of Present Illness    Subjective comment: Pt reports no new concerns. Pt reports good HEP adherece.           Objective   See Exercise, Manual, and Modality Logs for complete treatment.           Assessment & Plan       Assessment  Assessment details: OT/CHT provided Pt with min/mod cues and instructions, including technique and pacing, to complete therapist modified interventions focused on left UE function.    Pt engaged in mirror box therapy for left hand sensory changes and numbness. Pt completed various functional activities to improve motor skills. Pt continues to make gradual improvement.      Pt demonstrated good effort and motivation. Pt will benefit from continued OT treatment.  Prognosis: good    Plan  Planned modality interventions: ultrasound, thermotherapy (hydrocollator packs), thermotherapy (paraffin bath), TENS, microcurrent electrical stimulation, electrical stimulation/Russian stimulation, high voltage pulsed current (pain management) and cryotherapy  Planned therapy interventions: therapeutic activities, strengthening, soft tissue mobilization, manual therapy, motor coordination training, neuromuscular re-education, orthotic fitting/training, joint mobilization, IADL  retraining, home exercise program, functional ROM exercises, flexibility, fine motor coordination training, body mechanics training, ADL retraining and stretching  Frequency: 1x week  Treatment plan discussed with: patient        Progress per Plan of Care               Timed Codes        Manual Therapy:    0     mins  18653;    Therapeutic Exercise:    0     mins  27141;     Neuromuscular Helena:    23    mins  14417;    Therapeutic Activity:     31    mins  27248;      Ultrasound:     0     mins  52781;    Community/ work    0     mins  44352  Self Care/ Rodolfo    0     mins  62890  Sensory Re-Int.             0     mins   86783  Cognitve Re-train    0     mins  00008   Electrical Stimulation:    0     mins 54574      Un-timed Codes  Electrical Stimulation:    0     mins 69718 ( );      Timed Treatment:   54   mins   Total Treatment:     54   mins       OT SIGNATURE:       Occupational Therapist, Certified Hand Therapist    [place] License #[id number]  NPI #[id number]  Electronically signed by: Kenny D. Maynes, OTR/L, WENDIT, RADHA, SUSANNAH 3/14/2024

## 2024-03-18 ENCOUNTER — SPECIALTY PHARMACY (OUTPATIENT)
Dept: ONCOLOGY | Facility: HOSPITAL | Age: 44
End: 2024-03-18
Payer: COMMERCIAL

## 2024-03-18 RX ORDER — DIMETHYL FUMARATE 240 MG/1
240 CAPSULE ORAL 2 TIMES DAILY
Qty: 60 CAPSULE | Refills: 0 | Status: SHIPPED | OUTPATIENT
Start: 2024-03-18 | End: 2024-03-22 | Stop reason: SDUPTHER

## 2024-03-18 RX ORDER — DIMETHYL FUMARATE 120 MG/1
120 CAPSULE ORAL 2 TIMES DAILY
Qty: 14 CAPSULE | Refills: 0 | Status: SHIPPED | OUTPATIENT
Start: 2024-03-18 | End: 2024-03-22 | Stop reason: SDUPTHER

## 2024-03-18 NOTE — PROGRESS NOTES
Specialty Pharmacy Patient Management Program  Neurology Initial Assessment     Starr Armenta is a 43 y.o. female with multiple sclerosis seen by a Neurology provider and enrolled in the Neurology Patient Management program offered by [facility].  An initial outreach was conducted, including assessment of therapy appropriateness and specialty medication education for Dimethyl fumarate.  Pt prescribed Kesimpta, but insurance will not cover Kesimpta without trying 2 medications on their Marketplace Formulary.  Pt started glatiramer in 2001 and continue therapy for 10 years, but has not tried anything else to date. The patient was introduced to services offered by [facility], including: regular assessments, refill coordination, curbside pick-up or mail order delivery options, prior authorization maintenance, and financial assistance programs as applicable. The patient was also provided with contact information for the pharmacy team.     Insurance Coverage & Financial Support  [facility]    Relevant Past Medical History and Comorbidities  Relevant medical history and concomitant health conditions were discussed with the patient. The patient's chart has been reviewed for relevant past medical history and comorbid health conditions and updated as necessary.   Past Medical History:   Diagnosis Date    CTS (carpal tunnel syndrome)     Disease of thyroid gland     Multiple sclerosis      Social History     Socioeconomic History    Marital status:    Tobacco Use    Smoking status: Never     Passive exposure: Never   Vaping Use    Vaping status: Never Used   Substance and Sexual Activity    Alcohol use: No    Drug use: Never    Sexual activity: Yes     Partners: Male     Birth control/protection: None     Problem list reviewed by Carole Agrawal, PharmD on 3/18/2024 at  3:44 PM    Allergies  Known allergies and reactions were discussed with the patient. The patient's  chart has been reviewed for  allergy information and updated as necessary.   No Known Allergies  Allergies reviewed by Carole Agrawal, PharmD on 3/18/2024 at  3:44 PM    Relevant Laboratory Values  Common labs          1/13/2024    02:54 1/13/2024    10:38 1/16/2024    10:39 1/17/2024    16:23   Common Labs   Glucose 112    132    BUN 18    23    Creatinine 0.70  1.00   0.73    Sodium 141    140    Potassium 3.7    3.7    Chloride 104    105    Calcium 8.4    8.2    Albumin 4.2   3.7  3.7    Total Bilirubin 0.7    0.5    Alkaline Phosphatase 68    78    AST (SGOT) 48    24    ALT (SGPT) 59    45    WBC 14.47    19.01    Hemoglobin 13.9  11.2   13.9    Hematocrit 41.6  33   39.9    Platelets 321    341        Lab Assessment  The above labs have been reviewed. No dose adjustments are needed for the specialty medication(s) based on the labs.     Current Medication List  This medication list has been reviewed with the patient and evaluated for any interactions or necessary modifications/recommendations, and updated to include all prescription medications, OTC medications, and supplements the patient is currently taking.  This list reflects what is contained in the patient's profile, which has also been marked as reviewed to communicate to other providers it is the most up to date version of the patient's current medication therapy.     Current Outpatient Medications:     cholecalciferol (VITAMIN D3) 25 MCG (1000 UT) tablet, Take 1 tablet by mouth Daily., Disp: 30 tablet, Rfl: 11    Dimethyl Fumarate (Tecfidera) 120 MG capsule delayed-release, Take 120 mg by mouth 2 (Two) Times a Day for 7 days. Take 120 mg by mouth twice daily for 7 days, then increase to 240 mg po twice daily thereafter., Disp: 14 capsule, Rfl: 0    Dimethyl Fumarate (Tecfidera) capsule delayed-release, Take 1 capsule by mouth 2 (Two) Times a Day., Disp: 60 capsule, Rfl: 0    levothyroxine (SYNTHROID, LEVOTHROID) 88 MCG tablet, Take 1 tablet by mouth  Daily., Disp: , Rfl:     omeprazole (priLOSEC) 20 MG capsule, Take 1 capsule by mouth Daily. (Patient not taking: Reported on 1/19/2024), Disp: 30 capsule, Rfl: 0    OXcarbazepine (TRILEPTAL) 150 MG tablet, Take 1 tablet by mouth 2 (Two) Times a Day., Disp: 60 tablet, Rfl: 5    Medicines reviewed by Carole Agrawal, PharmD on 3/18/2024 at  3:44 PM    Drug Interactions  No relevant drug/drug interaction noted with dimethyl fumarate     Initial Education Provided for Specialty Medication  The patient has been provided with the following education and any applicable administration techniques (i.e. self-injection) have been demonstrated for the therapies indicated. All questions and concerns have been addressed prior to the patient receiving the medication, and the patient has verbalized understanding of the education and any materials provided.  Additional patient education shall be provided and documented upon request by the patient, provider or payer.      Tecfidera (dimethyl fumarate)   Medication Expectations   Why am I taking this medication? Tecfidera is FDA approved to treat multiple sclerosis, relapsing forms.   What should I expect while on this medication? A reduction in the severity and frequency of relapses.   How does the medication work? The exact mechanism of action is unknown but it dampens down inflammation. This may be helpful in reducing the inflammation that causes damage in the brain and spinal cord of people with MS.   How long will I be on this medication for? The amount of time you will be on this medication will be determined by your doctor based on how your MS progresses.   How do I take this medication? Take as directed on your prescription label. Initial, 120 mg orally twice daily for 7 days, then increase to 240 mg twice daily. Patient experiencing flushing may take Tecfidera with food or premedicate with non-enteric coated aspirin to reduce flushing. Capsules should be swallowed whole and  not chewed, crushed, or opened .   What are some possible side effects? Side effects may include but not limited to abdominal pain, diarrhea, nausea, vomiting, pruritus, and rash.    What happens if I miss a dose? Take the medicine as soon as you can, but skip the missed dose if it is almost time for your next dose. Do not take two doses at one time.     Medication Safety   What are things I should warn my doctor immediately about? Allergic reaction: Itching or hives, swelling in your face or hands, swelling or tingling in your mouth or throat, chest tightness, trouble breathing. Chest pain, trouble breathing. Dark urine or pale stools, nausea, vomiting, loss of appetite, stomach pain, yellow skin or eyes, Fever or chills, cough, sore throat, body aches.   What are things that I should be cautious of? Tell your doctor if you are pregnant or breastfeeding, or if you have any type of infection (including herpes zoster). This medicine may cause the following problems: May increase risk for infection (including progressive multifocal leukoencephalopathy) and Liver damage.   What are some medications that can interact with this one? Some products that may interact with this drug include: other drugs that weaken the immune system/increase the risk of infection (such as natalizumab, rituximab).  Check with your pharmacist before starting any new medications.     Medication Storage/Handling   How should I handle this medication? Keep out of reach of pets and children.   How does this medication need to be stored? Store between 15 and 30 degrees C (59 and 86 degrees F) protected from light, heat and moisture. Discard opened bottles after 90 days    How should I dispose of this medication? There should not be a need to dispose of this medication unless your provider decides to change the dose or therapy. If that is the case, take to your local police station for proper disposal. Some pharmacies also have take-back bins for  medication drop-off.      Resources/Support   How can I remind myself to take this medication? You can download reminder apps to help you manage your refills. You may also set an alarm on your phone to remind you. The pharmacy carries pill boxes that you can place next to an area you pass everyday (such as where you place your car keys or where you charge your phone)   Is financial support available?  If using brand name [employer] may be able toprovide co-pay cards if you have commercial insurance or patient assistance if you have Medicare or no insurance.    Which vaccines are recommended for me? Talk to your doctor about these vaccines: Flu, Coronavirus (COVID-19), Pneumococcal (pneumonia), Tdap, Hepatitis B, Zoster (shingles)          Adherence and Self-Administration  Adherence related to the patient's specialty therapy was discussed with the patient. The Adherence segment of this outreach has been reviewed and updated.   Is there a concern with patient's ability to self administer the medication correctly and without issue?: No  Were any potential barriers to adherence identified during the initial assessment or patient education?: No  Are there any concerns regarding the patient's understanding of the importance of medication adherence?: No  Methods for Supporting Patient Adherence and/or Self-Administration: not required    Goals of Therapy  Goals related to the patient's specialty therapy were discussed with the patient. The Patient Goals segment of this outreach has been reviewed and updated.   Goals Addressed Today        Specialty Pharmacy General Goal       Reduce the number of relapses, delay progression of disability, and limit new disease activity (as seen on MRI).                 Reassessment Plan & Follow-Up  Medication Therapy Changes: Start dimethyl fumarate 120mg po bid x 7 days then increase to 240 mg po bid thereafter.  Related Plans, Therapy Recommendations, or Therapy Problems to Be Addressed:  none  Pharmacist to perform regular reassessments no more than (6) months from the previous assessment.  Care Coordinator to set up future refill outreaches, coordinate prescription delivery, and escalate clinical questions to pharmacist.   Welcome information and patient satisfaction survey to be sent by specialty pharmacy team with patient's initial fill.    Attestation  Therapeutic appropriateness: Appropriate   I attest the patient was actively involved in and has agreed to the above plan of care. If the prescribed therapy is at any point deemed not appropriate based on the current or future assessments, a consultation will be initiated with the patient's specialty care provider to determine the best course of action. The revised plan of therapy will be documented along with any additional patient education provided. Discussed aforementioned material with patient via telemedicine.    Carole Agrawal, PharmD, [facility]  Clinic Specialty Pharmacist, Neurology  3/18/2024  15:45 EDT

## 2024-03-21 ENCOUNTER — TREATMENT (OUTPATIENT)
Dept: PHYSICAL THERAPY | Facility: CLINIC | Age: 44
End: 2024-03-21
Payer: COMMERCIAL

## 2024-03-21 DIAGNOSIS — G35 MULTIPLE SCLEROSIS: ICD-10-CM

## 2024-03-21 DIAGNOSIS — R20.0 NUMBNESS OF LEFT HAND: ICD-10-CM

## 2024-03-21 DIAGNOSIS — R27.8 OTHER LACK OF COORDINATION: Primary | ICD-10-CM

## 2024-03-21 PROCEDURE — 97112 NEUROMUSCULAR REEDUCATION: CPT | Performed by: OCCUPATIONAL THERAPIST

## 2024-03-21 PROCEDURE — 97530 THERAPEUTIC ACTIVITIES: CPT | Performed by: OCCUPATIONAL THERAPIST

## 2024-03-21 NOTE — PROGRESS NOTES
____________________________________________________________________    [facility]  Outpatient Rehabilitation  [address] [place] [address]  Phone: [phone] / Fax: [phone]       Occupational Therapy     Treatment Note            Patient: Starr Armenta   : 1980  Diagnosis/ICD-10 Code:  Other lack of coordination [R27.8]  Referring practitioner: Panda Márquez MD  Date of Initial Visit: Type: THERAPY  Noted: 2024  Today's Date: 3/21/2024  Patient seen for 7 sessions    Visit Diagnoses:    ICD-10-CM ICD-9-CM   1. Other lack of coordination  R27.8 781.3   2. Numbness of left hand  R20.0 782.0   3. Multiple sclerosis  G35 340              Subjective Evaluation    History of Present Illness    Subjective comment: Pt reports no new concerns. Pt reports good HEP adherece. Pt reports slightly increased fatigue this session.           Objective   See Exercise, Manual, and Modality Logs for complete treatment.           Assessment & Plan       Assessment  Assessment details: OT/CHT provided Pt with min/mod cues and instructions, including technique and pacing, to complete therapist modified interventions focused on left UE function.    Pt engaged in mirror box therapy for left hand sensory changes and numbness. Pt completed various functional activities to improve motor skills and strength. Pt continues to make gradual improvement. Pt required a brief rest this session.     Pt demonstrated good effort and motivation. Pt will benefit from continued OT treatment.  Prognosis: good    Plan  Planned modality interventions: ultrasound, thermotherapy (hydrocollator packs), thermotherapy (paraffin bath), TENS, microcurrent electrical stimulation, electrical stimulation/Russian stimulation, high voltage pulsed current (pain management) and cryotherapy  Planned therapy interventions: therapeutic activities, strengthening, soft tissue mobilization, manual therapy, motor  coordination training, neuromuscular re-education, orthotic fitting/training, joint mobilization, IADL retraining, home exercise program, functional ROM exercises, flexibility, fine motor coordination training, body mechanics training, ADL retraining and stretching  Frequency: 1x week  Treatment plan discussed with: patient        Progress per Plan of Care               Timed Codes        Manual Therapy:    0     mins  22851;    Therapeutic Exercise:    0     mins  57368;     Neuromuscular Helena:    14    mins  14020;    Therapeutic Activity:     29    mins  99733;      Ultrasound:     0     mins  75724;    Community/ work    0     mins  08911  Self Care/ Rodolfo    0     mins  11292  Sensory Re-Int.             0     mins   84331  Cognitve Re-train    0     mins  42218   Electrical Stimulation:    0     mins 01157      Un-timed Codes  Electrical Stimulation:    0     mins 46664 ( );      Timed Treatment:   43   mins   Total Treatment:     43   mins       OT SIGNATURE:       Occupational Therapist, Certified Hand Therapist    [place] License #[id number]  NPI #[id number]  Electronically signed by: Kenny D. Maynes, CELINE/L, CHT, CKTP, CEAS 3/21/2024

## 2024-03-22 RX ORDER — DIMETHYL FUMARATE 120 MG/1
120 CAPSULE ORAL 2 TIMES DAILY
Qty: 14 CAPSULE | Refills: 0 | Status: SHIPPED | OUTPATIENT
Start: 2024-03-22 | End: 2024-03-29

## 2024-03-22 RX ORDER — DIMETHYL FUMARATE 240 MG/1
240 CAPSULE ORAL 2 TIMES DAILY
Qty: 60 CAPSULE | Refills: 0 | Status: SHIPPED | OUTPATIENT
Start: 2024-03-22

## 2024-03-27 ENCOUNTER — TREATMENT (OUTPATIENT)
Dept: PHYSICAL THERAPY | Facility: CLINIC | Age: 44
End: 2024-03-27
Payer: COMMERCIAL

## 2024-03-27 DIAGNOSIS — G35 MULTIPLE SCLEROSIS: ICD-10-CM

## 2024-03-27 DIAGNOSIS — R20.0 NUMBNESS OF LEFT HAND: ICD-10-CM

## 2024-03-27 DIAGNOSIS — R27.8 OTHER LACK OF COORDINATION: Primary | ICD-10-CM

## 2024-03-27 PROCEDURE — 97112 NEUROMUSCULAR REEDUCATION: CPT | Performed by: OCCUPATIONAL THERAPIST

## 2024-03-27 PROCEDURE — 97530 THERAPEUTIC ACTIVITIES: CPT | Performed by: OCCUPATIONAL THERAPIST

## 2024-03-27 NOTE — PROGRESS NOTES
____________________________________________________________________    [facility]  Outpatient Rehabilitation  [address] [place] [address]  Phone: [phone] / Fax: [phone]       Occupational Therapy     Treatment Note            Patient: Starr Armenta   : 1980  Diagnosis/ICD-10 Code:  Other lack of coordination [R27.8]  Referring practitioner: Panda Márquez MD  Date of Initial Visit: Type: THERAPY  Noted: 2024  Today's Date: 3/27/2024  Patient seen for 8 sessions    Visit Diagnoses:    ICD-10-CM ICD-9-CM   1. Other lack of coordination  R27.8 781.3   2. Numbness of left hand  R20.0 782.0   3. Multiple sclerosis  G35 340              Subjective Evaluation    History of Present Illness    Subjective comment: Pt reports no new concerns. Pt reports good HEP adherece.           Objective   See Exercise, Manual, and Modality Logs for complete treatment.           Assessment & Plan       Assessment  Assessment details: OT/CHT provided Pt with min/mod cues and instructions, including technique and pacing, to complete therapist modified interventions focused on left UE function.    Pt engaged in mirror box therapy for left hand sensory changes and numbness. Pt completed various functional activities to improve motor skills and strength. Pt continues to report gradual improvement. Also, pt continues progress to include high activity tolerance and strength demanding activity.      Pt demonstrated good effort and motivation. Pt will benefit from continued OT treatment.  Prognosis: good    Plan  Planned modality interventions: ultrasound, thermotherapy (hydrocollator packs), thermotherapy (paraffin bath), TENS, microcurrent electrical stimulation, electrical stimulation/Russian stimulation, high voltage pulsed current (pain management) and cryotherapy  Planned therapy interventions: therapeutic activities, strengthening, soft tissue mobilization, manual therapy,  motor coordination training, neuromuscular re-education, orthotic fitting/training, joint mobilization, IADL retraining, home exercise program, functional ROM exercises, flexibility, fine motor coordination training, body mechanics training, ADL retraining and stretching  Frequency: 1x week  Treatment plan discussed with: patient        Progress per Plan of Care               Timed Codes        Manual Therapy:    0     mins  87642;    Therapeutic Exercise:    0     mins  43192;     Neuromuscular Helena:    26    mins  76674;    Therapeutic Activity:     30    mins  20321;      Ultrasound:     0     mins  48391;    Community/ work    0     mins  46117  Self Care/ Rodolfo    0     mins  09954  Sensory Re-Int.             0     mins   54709  Cognitve Re-train    0     mins  85887   Electrical Stimulation:    0     mins 38412      Un-timed Codes  Electrical Stimulation:    0     mins 31043 ( );      Timed Treatment:   56   mins   Total Treatment:     56   mins       OT SIGNATURE:       Occupational Therapist, Certified Hand Therapist    [place] License #[id number]  NPI #[id number]  Electronically signed by: Kenny D. Maynes, CELINE/L, CHT, CKKAREN, SUSANNAH 3/27/2024

## 2024-04-08 ENCOUNTER — SPECIALTY PHARMACY (OUTPATIENT)
Dept: ONCOLOGY | Facility: HOSPITAL | Age: 44
End: 2024-04-08
Payer: COMMERCIAL

## 2024-04-10 NOTE — PROGRESS NOTES
Specialty Pharmacy Patient Management Program  Neurology Initial Assessment     Starr Armenta is a 43 y.o. female with multiple sclerosis seen by a Neurology provider and enrolled in the Neurology Patient Management program offered by [facility].  An initial outreach was conducted, including assessment of therapy appropriateness and specialty medication education for kesimpta. The patient was introduced to services offered by [facility], including: regular assessments, refill coordination, curbside pick-up or mail order delivery options, prior authorization maintenance, and financial assistance programs as applicable. The patient was also provided with contact information for the pharmacy team.     Insurance Coverage & Financial Support  [employer] submitted 4/10/24    Relevant Past Medical History and Comorbidities  Relevant medical history and concomitant health conditions were discussed with the patient. The patient's chart has been reviewed for relevant past medical history and comorbid health conditions and updated as necessary.   Past Medical History:   Diagnosis Date    CTS (carpal tunnel syndrome)     Disease of thyroid gland     Multiple sclerosis      Social History     Socioeconomic History    Marital status:    Tobacco Use    Smoking status: Never     Passive exposure: Never   Vaping Use    Vaping status: Never Used   Substance and Sexual Activity    Alcohol use: No    Drug use: Never    Sexual activity: Yes     Partners: Male     Birth control/protection: None          Allergies  Known allergies and reactions were discussed with the patient. The patient's chart has been reviewed for  allergy information and updated as necessary.   No Known Allergies       Relevant Laboratory Values  Common labs          1/13/2024    02:54 1/13/2024    10:38 1/16/2024    10:39 1/17/2024    16:23   Common Labs   Glucose 112    132    BUN 18    23    Creatinine  0.70  1.00   0.73    Sodium 141    140    Potassium 3.7    3.7    Chloride 104    105    Calcium 8.4    8.2    Albumin 4.2   3.7  3.7    Total Bilirubin 0.7    0.5    Alkaline Phosphatase 68    78    AST (SGOT) 48    24    ALT (SGPT) 59    45    WBC 14.47    19.01    Hemoglobin 13.9  11.2   13.9    Hematocrit 41.6  33   39.9    Platelets 321    341        Lab Assessment  The above labs have been reviewed. No dose adjustments are needed for the specialty medication(s) based on the labs.     Current Medication List  This medication list has been reviewed with the patient and evaluated for any interactions or necessary modifications/recommendations, and updated to include all prescription medications, OTC medications, and supplements the patient is currently taking.  This list reflects what is contained in the patient's profile, which has also been marked as reviewed to communicate to other providers it is the most up to date version of the patient's current medication therapy.     Current Outpatient Medications:     cholecalciferol (VITAMIN D3) 25 MCG (1000 UT) tablet, Take 1 tablet by mouth Daily., Disp: 30 tablet, Rfl: 11    levothyroxine (SYNTHROID, LEVOTHROID) 88 MCG tablet, Take 1 tablet by mouth Daily., Disp: , Rfl:     omeprazole (priLOSEC) 20 MG capsule, Take 1 capsule by mouth Daily. (Patient not taking: Reported on 1/19/2024), Disp: 30 capsule, Rfl: 0    OXcarbazepine (TRILEPTAL) 150 MG tablet, Take 1 tablet by mouth 2 (Two) Times a Day., Disp: 60 tablet, Rfl: 5         Drug Interactions       Initial Education Provided for Specialty Medication  The patient has been provided with the following education and any applicable administration techniques (i.e. self-injection) have been demonstrated for the therapies indicated. All questions and concerns have been addressed prior to the patient receiving the medication, and the patient has verbalized understanding of the education and any materials provided.   Additional patient education shall be provided and documented upon request by the patient, provider or payer.        Kesimpta (ofatumumab)    Medication Expectations   Why am I taking this medication? KESIMPTA is indicated for the treatment of relapsing forms of multiple sclerosis (MS), to include clinically isolated syndrome, relapsing-remitting disease, and active secondary progressive disease, in adults.   What should I expect while on this medication? A reduction in severity and frequency of relapses.   How does the medication work? Although it’s not known exactly how KESIMPTA works in relapsing MS, it’s thought to travel specifically to the lymph nodes based on the way it’s taken. From there, it’s thought to reduce the number of B cells that are thought to play a role in relapsing MS.  This targeted approach to the lymph nodes is thought to limit the impact on other B cells your body needs to maintain immune function.   How long will I be on this medication for? The amount of time you will be on this medication will be determined by your doctor based on how your MS progresses.   How do I take this medication? Initial, 20 mg subQ at weeks 0, 1, and 2, followed by once monthly starting at week 4.  Allow to reach room temp for 15-30 minutes before giving yourself the injection.  Administer in the abdomen, thigh or outer upper arm subQ.  Alternate sites and to not give into moles, scars, stretch marks of were skin is tender, bruised, red, scaly or hard.   What are some possible side effects? Injection-related reactions were mostly mild to moderate. They mostly happen with the first injection, although they can happen with later injections.  Local injection-site reactions (redness, pain, itching, and swelling)  Injection-related reactions (fever, headache, muscle pain, chills, and fatigue)  Other possible side effects include:  Upper respiratory infections (sore throat, runny nose, and headache)   What happens if I  miss a dose? f you miss an injection of KESIMPTA at Week 0, 1, or 2, talk to your doctor. If you miss a monthly injection, take it as soon as possible without waiting until the next scheduled dose. After that, take your KESIMPTA injections a month apart.         Medication Safety   What are things I should warn my doctor immediately about? Allergic reaction: Itching or hives, swelling in your face or hands, swelling or tingling in your mouth or throat, chest tightness, trouble breathing  Change in how much or how often you urinate, trouble urinating, painful urination  Chest pain, trouble breathing  Confusion, dizziness, loss of balance, trouble speaking or understanding, vision problems  Dark urine or pale stools, nausea, vomiting, loss of appetite, stomach pain, yellow skin or eyes  Fever, chills, cough, runny or stuffy nose, sore throat, and body aches  Unusual bleeding, bruising, or weakness  Warmth or redness in your face, neck, arms, or upper chest   What are things that I should be cautious of? Hepatitis B virus (HBV) reactivation: Before starting KESIMPTA, you’ll get a blood test for HBV. If you’ve ever had HBV infection, it may become active again during or after treatment with KESIMPTA.  Progressive multifocal leukoencephalopathy (PML): PML may happen with KESIMPTA. PML is a rare, serious brain infection caused by a virus that may get worse over days or weeks, and can result in death or severe disability.  Weakened immune system: Taking KESIMPTA before or after other medicines that weaken your immune system could increase your risk of getting infections   What are some medications that can interact with this one? Live vaccines and use of potent immunosuppressants.     Medication Storage/Handling   How should I handle this medication? Keep out of reach from pets and children.   How does this medication need to be stored? Store this medication in the refrigerator until ready for use.   How should I dispose  of this medication? Put your used needles and syringes in an FDA-cleared sharps disposal container right away after use. Do not throw away (dispose of) loose needles and syringes inyour household trash.   If you do not have an FDA-cleared sharps disposal container, you may use a household container that is:? made of a heavy-duty plastic,?can be closed with a tight-fitting, puncture-resistant lid, without sharpsbeing able to come out, and properly labeled to warn of hazardous waste inside the container.     Resources/Support   How can I remind myself to take this medication? You can download reminder apps to help you manage your refills. You may also set an alarm on your phone to remind you.    Is financial support available?  [employer] offers Access and Support if you have commercial insurance or patient assistance if you have Medicare or no insurance or PAP programs for MS.   Which vaccines are recommended for me? Talk to your doctor about these vaccines: Flu, Coronavirus (COVID-19), Pneumococcal (pneumonia), Tdap, Hepatitis B, Zoster (shingles)           Adherence and Self-Administration  Adherence related to the patient's specialty therapy was discussed with the patient. The Adherence segment of this outreach has been reviewed and updated.            Methods for Supporting Patient Adherence and/or Self-Administration:  kesimpta self injection administration in office    Goals of Therapy  Goals related to the patient's specialty therapy were discussed with the patient. The Patient Goals segment of this outreach has been reviewed and updated.   Goals Addressed Today    None          Reassessment Plan & Follow-Up  Medication Therapy Changes: kesimpta  Related Plans, Therapy Recommendations, or Therapy Problems to Be Addressed: adding kesimpta  Pharmacist to perform regular reassessments no more than (6) months from the previous assessment.  Care Coordinator to set up future refill outreaches, coordinate prescription  delivery, and escalate clinical questions to pharmacist.   Welcome information and patient satisfaction survey to be sent by specialty pharmacy team with patient's initial fill.    Attestation      I attest the patient was actively involved in and has agreed to the above plan of care. If the prescribed therapy is at any point deemed not appropriate based on the current or future assessments, a consultation will be initiated with the patient's specialty care provider to determine the best course of action. The revised plan of therapy will be documented along with any additional patient education provided. Discussed aforementioned material with patient by phone.    Carole Agrawal, PharmD, [facility] Specialty Pharmacist, Neurology  4/10/2024  11:59 EDT

## 2024-04-18 ENCOUNTER — TREATMENT (OUTPATIENT)
Dept: PHYSICAL THERAPY | Facility: CLINIC | Age: 44
End: 2024-04-18
Payer: COMMERCIAL

## 2024-04-18 DIAGNOSIS — R27.8 OTHER LACK OF COORDINATION: Primary | ICD-10-CM

## 2024-04-18 DIAGNOSIS — R20.0 NUMBNESS OF LEFT HAND: ICD-10-CM

## 2024-04-18 DIAGNOSIS — G35 MULTIPLE SCLEROSIS: ICD-10-CM

## 2024-04-18 PROCEDURE — 97530 THERAPEUTIC ACTIVITIES: CPT | Performed by: OCCUPATIONAL THERAPIST

## 2024-04-18 PROCEDURE — 97112 NEUROMUSCULAR REEDUCATION: CPT | Performed by: OCCUPATIONAL THERAPIST

## 2024-04-18 NOTE — PROGRESS NOTES
____________________________________________________________________    [facility]  Outpatient Rehabilitation  [address] [place] [address]  Phone: [phone] / Fax: [phone]       Occupational Therapy     Progress Note             Patient: Starr Armenta   : 1980  Diagnosis/ICD-10 Code:  Other lack of coordination [R27.8]  Referring practitioner: Panda Márquez MD  Date of Initial Visit: 2024  Today's Date: 2024  Patient seen for 9 session         Visit Diagnoses:    ICD-10-CM ICD-9-CM   1. Other lack of coordination  R27.8 781.3   2. Numbness of left hand  R20.0 782.0   3. Multiple sclerosis  G35 340           Subjective Evaluation    History of Present Illness  Date of onset: 2024    Subjective comment: Pt reports no new concerns. Pt reports f/u with referring provider tomorrow.Pain  No pain reported    Social Support  Lives in: multiple-level home  Lives with: spouse and young children    Hand dominance: right    Patient Goals  Patient goals for therapy: increased strength, independence with ADLs/IADLs, increased motion, return to work and decreased pain             Objective          Active Range of Motion   Left Shoulder   Normal active range of motion    Right Shoulder   Normal active range of motion    Left Elbow   Normal active range of motion    Right Elbow   Normal active range of motion    Left Wrist   Normal active range of motion    Right Wrist   Normal active range of motion    Additional Active Range of Motion Details  Pt reports stiff feeling with left UE Apley Scratch Test  WNLs    Strength/Myotome Testing     Left Shoulder   Normal muscle strength    Right Shoulder   Normal muscle strength    Left Elbow   Normal strength    Right Elbow   Normal strength    Left Wrist/Hand   Normal wrist strength    Right Wrist/Hand   Normal wrist strength    Functional Assessment     Comments  O Box and Blocks  (Norms: Right=82.1; Left =  78.3)    Right= 65 > 63 > 66  Left= 50 > 55 > 65    O Nine Hole Peg Test  (Norms: Right=16.54; Left = 17.64)    Right= 17.28 > 14.73 > 14.73   Left= 27.94 > 24.70 > 22.26    O Monofilaments:    Left Fingertips    6.65 positive > positive   4.56 negative > positive   4.31 negative > positive   3.61 negative > IF and SF positive ; thumb, LF, and RF negative > positive   2.83 negative > negative > IF positive, SF RF LF Thumb negative    O QuickDASH Score: 4.5 % > NT  >  0.0 %    O QuickDASH work total 12.5%, (raw score 6) > 6.25 (Raw =5)               Assessment & Plan       Assessment  Impairments: abnormal coordination, activity intolerance and safety issue   Functional limitations: carrying objects and lifting   Assessment details: OT/CHT provided Pt with minimal  cues and instructions, including technique and pacing, to complete therapist modified interventions focused on progress testing and education, as well as left UE function.     Pt demonstrates improved motor skills with blocks and blocks and 9-hole peg tests. Pt demonstrates improved QuickDASH score and improve monofilaments fingertip testing.     Pt continues to demonstrates mild left hand numbness and decreased activity tolerance.     Pt will benefit from continued OT/hand therapy treatment at this time. Pt demonstrates excellent effort and motivation.   Prognosis: good    Goals  Plan Goals: STG - 4 weeks    To improve independence and safety in ADLs and iADLs:     1  Pt will improve Box and Block scoring by 10%  >met  2  Pt will improve 9-hole peg test scores by 10%  >met  3  Pt will be independent with beginning HEP and able to demonstrate in clinic.  >met  4  Pt will demonstrate fair understanding of compensatory skills and/or adaptive equipment as needed.  >met  5 Pt will complete 10 minutes of moderate activity without rest breaks.  >ongoing       LTG - 12 weeks    To improve independence and safety in ADLs and iADLs:    1  Pt will improve Box and  Block scoring to >/= 90% of norm  >ongoing  2  Pt will improve 9-hole peg test scores to >/= 90% of norm  >progressing partially met  3  Pt will be independent with complete HEP and able to demonstrate in clinic.  >ongoing  4  Pt will demonstrate good understanding of compensatory skills and/or adaptive equipment as needed.  >met  5 Pt will complete 20-30 minutes of moderate activity without rest breaks.  >ongoing    Plan  Planned modality interventions: ultrasound, thermotherapy (paraffin bath), thermotherapy (hydrocollator packs), TENS, microcurrent electrical stimulation, high voltage pulsed current (pain management), electrical stimulation/Russian stimulation and cryotherapy  Planned therapy interventions: therapeutic activities, stretching, strengthening, soft tissue mobilization, orthotic fitting/training, neuromuscular re-education, motor coordination training, manual therapy, joint mobilization, IADL retraining, home exercise program, functional ROM exercises, flexibility, fine motor coordination training, body mechanics training, balance/weight-bearing training and ADL retraining  Frequency: 1x week  Duration in weeks: 12  Treatment plan discussed with: patient        Timed:         Manual Therapy:    0     mins  74639;     Therapeutic Exercise:    0     mins  65285;     Neuromuscular Helena:    31    mins  45652;    Therapeutic Activity:     14     mins  45911;     Ultrasound:     0     mins  02581;    Ionto                               0    mins   63064  Self Care                       0     mins   11800  Electrical Stimulation:    0     mins  28375    Un-Timed:  Electrical Stimulation:    0     mins  89769 ( );        Timed Treatment:   45   mins   Total Treatment:     45   mins            OT SIGNATURE:       Occupational Therapist, Certified Hand Therapist    [place] License #[id number]  NPI #[id number]  Electronically signed by: Kenny D. Maynes, CELINE/L, WENDIT, RADHA, SUSANNAH 4/18/2024

## 2024-04-18 NOTE — LETTER
2024    Starr Armenta  [address] [place] [id number]      [facility]  Outpatient Rehabilitation  [address] [id number]  Phone: [phone] / Fax: [phone]        Occupational Therapy      Progress Note                  Patient: Starr Armenta   : 1980  Diagnosis/ICD-10 Code:  Other lack of coordination [R27.8]  Referring practitioner: Panda Márquez MD  Date of Initial Visit: 2024  Today's Date: 2024  Patient seen for 9 session            Visit Diagnoses:  Visit Diagnosis       ICD-10-CM ICD-9-CM   1. Other lack of coordination  R27.8 781.3   2. Numbness of left hand  R20.0 782.0   3. Multiple sclerosis  G35 340                  Subjective Evaluation     History of Present Illness  Date of onset: 2024     Subjective comment: Pt reports no new concerns. Pt reports f/u with referring provider tomorrow.Pain  No pain reported     Social Support  Lives in: multiple-level home  Lives with: spouse and young children     Hand dominance: right     Patient Goals  Patient goals for therapy: increased strength, independence with ADLs/IADLs, increased motion, return to work and decreased pain                 Objective            Active Range of Motion   Left Shoulder   Normal active range of motion     Right Shoulder   Normal active range of motion     Left Elbow   Normal active range of motion     Right Elbow   Normal active range of motion     Left Wrist   Normal active range of motion     Right Wrist   Normal active range of motion     Additional Active Range of Motion Details  Pt reports stiff feeling with left UE Apley Scratch Test  WNLs     Strength/Myotome Testing      Left Shoulder   Normal muscle strength     Right Shoulder   Normal muscle strength     Left Elbow   Normal strength     Right Elbow   Normal strength     Left Wrist/Hand   Normal wrist strength     Right Wrist/Hand   Normal wrist strength     Functional Assessment       Comments  O Box and Blocks  (Norms: Right=82.1; Left = 78.3)     Right= 65 > 63 > 66  Left= 50 > 55 > 65     O Nine Hole Peg Test  (Norms: Right=16.54; Left = 17.64)     Right= 17.28 > 14.73 > 14.73   Left= 27.94 > 24.70 > 22.26     O Monofilaments:     Left Fingertips     6.65 positive > positive   4.56 negative > positive   4.31 negative > positive   3.61 negative > IF and SF positive ; thumb, LF, and RF negative > positive   2.83 negative > negative > IF positive, SF RF LF Thumb negative     O QuickDASH Score: 4.5 % > NT  >  0.0 %     O QuickDASH work total 12.5%, (raw score 6) > 6.25 (Raw =5)                 Assessment & Plan         Assessment  Impairments: abnormal coordination, activity intolerance and safety issue   Functional limitations: carrying objects and lifting   Assessment details: OT/CHT provided Pt with minimal  cues and instructions, including technique and pacing, to complete therapist modified interventions focused on progress testing and education, as well as left UE function.      Pt demonstrates improved motor skills with blocks and blocks and 9-hole peg tests. Pt demonstrates improved QuickDASH score and improve monofilaments fingertip testing.      Pt continues to demonstrates mild left hand numbness and decreased activity tolerance.      Pt will benefit from continued OT/hand therapy treatment at this time. Pt demonstrates excellent effort and motivation.   Prognosis: good     Goals  Plan Goals: STG - 4 weeks     To improve independence and safety in ADLs and iADLs:      1  Pt will improve Box and Block scoring by 10%  >met  2  Pt will improve 9-hole peg test scores by 10%  >met  3  Pt will be independent with beginning HEP and able to demonstrate in clinic.  >met  4  Pt will demonstrate fair understanding of compensatory skills and/or adaptive equipment as needed.  >met  5 Pt will complete 10 minutes of moderate activity without rest breaks.  >ongoing        LTG - 12 weeks     To  improve independence and safety in ADLs and iADLs:     1  Pt will improve Box and Block scoring to >/= 90% of norm  >ongoing  2  Pt will improve 9-hole peg test scores to >/= 90% of norm  >progressing partially met  3  Pt will be independent with complete HEP and able to demonstrate in clinic.  >ongoing  4  Pt will demonstrate good understanding of compensatory skills and/or adaptive equipment as needed.  >met  5 Pt will complete 20-30 minutes of moderate activity without rest breaks.  >ongoing     Plan  Planned modality interventions: ultrasound, thermotherapy (paraffin bath), thermotherapy (hydrocollator packs), TENS, microcurrent electrical stimulation, high voltage pulsed current (pain management), electrical stimulation/Russian stimulation and cryotherapy  Planned therapy interventions: therapeutic activities, stretching, strengthening, soft tissue mobilization, orthotic fitting/training, neuromuscular re-education, motor coordination training, manual therapy, joint mobilization, IADL retraining, home exercise program, functional ROM exercises, flexibility, fine motor coordination training, body mechanics training, balance/weight-bearing training and ADL retraining  Frequency: 1x week  Duration in weeks: 12  Treatment plan discussed with: patient           Timed:                                                                           Manual Therapy:            0     mins  27958;                    Therapeutic Exercise:    0     mins  50364;     Neuromuscular Helena:    31    mins  93557;    Therapeutic Activity:      14     mins  13148;     Ultrasound:                     0     mins  31771;    Ionto                               0    mins   14127  Self Care                       0     mins   23512  Electrical Stimulation:    0     mins  64228     Un-Timed:  Electrical Stimulation:    0     mins  78605 ( );           Timed Treatment:   45   mins   Total Treatment:     45   mins                 OT  SIGNATURE:        Occupational Therapist, Certified Hand Therapist     [place] License #[id number]  NPI #[id number]  Electronically signed by: Kenny D. Maynes, OTR/L, CHT, RADHA, CEAS 4/18/2024                         Kenny D Maynes, OT

## 2024-04-19 ENCOUNTER — TELEMEDICINE (OUTPATIENT)
Dept: NEUROLOGY | Facility: CLINIC | Age: 44
End: 2024-04-19
Payer: COMMERCIAL

## 2024-04-19 DIAGNOSIS — G35 MULTIPLE SCLEROSIS: Primary | Chronic | ICD-10-CM

## 2024-04-19 DIAGNOSIS — M79.2 NEUROPATHIC PAIN: ICD-10-CM

## 2024-04-19 PROCEDURE — 99214 OFFICE O/P EST MOD 30 MIN: CPT | Performed by: PSYCHIATRY & NEUROLOGY

## 2024-04-25 ENCOUNTER — TREATMENT (OUTPATIENT)
Dept: PHYSICAL THERAPY | Facility: CLINIC | Age: 44
End: 2024-04-25
Payer: COMMERCIAL

## 2024-04-25 DIAGNOSIS — R20.0 NUMBNESS OF LEFT HAND: ICD-10-CM

## 2024-04-25 DIAGNOSIS — G35 MULTIPLE SCLEROSIS: Primary | ICD-10-CM

## 2024-04-25 DIAGNOSIS — R27.8 OTHER LACK OF COORDINATION: ICD-10-CM

## 2024-04-25 PROCEDURE — 97530 THERAPEUTIC ACTIVITIES: CPT | Performed by: OCCUPATIONAL THERAPIST

## 2024-04-25 PROCEDURE — 97112 NEUROMUSCULAR REEDUCATION: CPT | Performed by: OCCUPATIONAL THERAPIST

## 2024-04-25 NOTE — PROGRESS NOTES
____________________________________________________________________    [facility]  Outpatient Rehabilitation  [address] [place] [address]  Phone: [phone] / Fax: [phone]       Occupational Therapy     Treatment Note            Patient: Starr Armenta   : 1980  Diagnosis/ICD-10 Code:  Multiple sclerosis [G35]  Referring practitioner: Panda Márquez MD  Date of Initial Visit: Type: THERAPY  Noted: 2024  Today's Date: 2024  Patient seen for 10 sessions    Visit Diagnoses:    ICD-10-CM ICD-9-CM   1. Multiple sclerosis  G35 340   2. Other lack of coordination  R27.8 781.3   3. Numbness of left hand  R20.0 782.0              Subjective Evaluation    History of Present Illness    Subjective comment: Pt reports no new concerns. Pt reports good telehealth f/u with referring provider.  Pt reports good HEP adherece.           Objective   See Exercise, Manual, and Modality Logs for complete treatment.           Assessment & Plan       Assessment  Assessment details: OT/CHT provided Pt with min/mod cues and instructions, including technique and pacing, to complete therapist modified interventions focused on left UE function.    Pt engaged in mirror box therapy for left hand sensory changes and numbness. Pt completed various functional activities to improve motor skills and strength. Pt continues to report gradual improvement. Also, pt continues progress to include high activity tolerance and strength demanding activity.      Pt demonstrated good effort and motivation. Pt will benefit from continued OT treatment.  Prognosis: good    Plan  Planned modality interventions: ultrasound, thermotherapy (hydrocollator packs), thermotherapy (paraffin bath), TENS, microcurrent electrical stimulation, electrical stimulation/Russian stimulation, high voltage pulsed current (pain management) and cryotherapy  Planned therapy interventions: therapeutic activities,  strengthening, soft tissue mobilization, manual therapy, motor coordination training, neuromuscular re-education, orthotic fitting/training, joint mobilization, IADL retraining, home exercise program, functional ROM exercises, flexibility, fine motor coordination training, body mechanics training, ADL retraining and stretching  Frequency: 1x week  Treatment plan discussed with: patient        Progress per Plan of Care               Timed Codes        Manual Therapy:    0     mins  90355;    Therapeutic Exercise:    0     mins  55611;     Neuromuscular [place]:    15    mins  35969;    Therapeutic Activity:     29    mins  93739;      Ultrasound:     0     mins  28574;    Community/ work    0     mins  80486  Self Care/ Rodolfo    0     mins  41522  Sensory Re-Int.             0     mins   55184  Cognitve Re-train    0     mins  19986   Electrical Stimulation:    0     mins 64482      Un-timed Codes  Electrical Stimulation:    0     mins 84045 ( );      Timed Treatment:   44   mins   Total Treatment:     44   mins       OT SIGNATURE:       Occupational Therapist, Certified Hand Therapist    [place] License #[id number]  NPI #[id number]  Electronically signed by: Kenny D. Maynes, CELINE/L, CHT, RADHA, SUSANNAH 4/25/2024

## 2024-05-02 ENCOUNTER — TELEPHONE (OUTPATIENT)
Dept: PHYSICAL THERAPY | Facility: CLINIC | Age: 44
End: 2024-05-02

## 2024-05-09 ENCOUNTER — TREATMENT (OUTPATIENT)
Dept: PHYSICAL THERAPY | Facility: CLINIC | Age: 44
End: 2024-05-09
Payer: COMMERCIAL

## 2024-05-09 DIAGNOSIS — R27.8 OTHER LACK OF COORDINATION: ICD-10-CM

## 2024-05-09 DIAGNOSIS — G35 MULTIPLE SCLEROSIS: Primary | ICD-10-CM

## 2024-05-09 DIAGNOSIS — R20.0 NUMBNESS OF LEFT HAND: ICD-10-CM

## 2024-05-09 PROCEDURE — 97530 THERAPEUTIC ACTIVITIES: CPT | Performed by: OCCUPATIONAL THERAPIST

## 2024-05-09 PROCEDURE — 97535 SELF CARE MNGMENT TRAINING: CPT | Performed by: OCCUPATIONAL THERAPIST

## 2024-05-09 PROCEDURE — 97110 THERAPEUTIC EXERCISES: CPT | Performed by: OCCUPATIONAL THERAPIST

## 2024-05-09 PROCEDURE — 97112 NEUROMUSCULAR REEDUCATION: CPT | Performed by: OCCUPATIONAL THERAPIST

## 2024-05-16 ENCOUNTER — TELEPHONE (OUTPATIENT)
Dept: NEUROLOGY | Facility: CLINIC | Age: 44
End: 2024-05-16
Payer: COMMERCIAL

## 2024-05-16 NOTE — TELEPHONE ENCOUNTER
She should be fine with the medications for the procedure.  There are no specific antibiotics that would be preferred.  Sometimes patients can have flares of the MS following procedures so it something for her to be aware of.

## 2024-05-16 NOTE — TELEPHONE ENCOUNTER
Patient is having oral surgery and they would like surgical clearance.  They give nitrous and halcion typically for the procedure. They would also like to know if there are any specific antibiotics and pain medications that can not give due to patients MS.

## 2024-05-16 NOTE — LETTER
May 17, 2024     Patient: Starr Armenta   YOB: 1980   Date of Visit: 5/16/2024       To Whom It May Concern:    It is my medical opinion that from a neurology standpoint Ms. Armenta is cleared for her procedure with you.  We see no contraindication with the medications that you use for the procedure and for antibiotics and pain medication post procedure.  Sometimes patients can have flares of the MS following procedures and this is something that she should be aware of.    If you have any questions or concerns, please don't hesitate to call.         Sincerely,        Panda Márquez MD        CC:   No Recipients

## 2024-06-11 RX ORDER — METHYLPREDNISOLONE 4 MG/1
TABLET ORAL
Qty: 21 TABLET | Refills: 0 | Status: SHIPPED | OUTPATIENT
Start: 2024-06-11

## 2024-06-11 NOTE — TELEPHONE ENCOUNTER
Caller: Starr Armenta  Relationship to Patient: SELF  Phone Number:   Telephone Information:   Mobile [phone]         Reason for Call: PT STATES THAT FOR THE PAST THREE DAYS SHE HAS BEEN HAVING INCREASED MS SYMPTOMS. SHE EXPRESSED SHE HAS FATIGUE, NUMBNESS IN LEFT ARM SPREADING TO FACE AND INTO RIGHT ARM. SHE ALSO EXPRESS BLE NUMBNESS AS WELL. SHE STATES THESE ARE THE SAME SYMPTOMS THAT OCCURRED DURING HER RELAPSE IN JANUARY 2024. SHE STATES THIS PAST WEEK SHE HAD A ROOT CANAL AND ALSO TRAVELED TO [place] AND BACK WITH TWO CHILDREN THAT TRIGGERED SOME STRESS IN BOTH INSTANCES. PLEASE REVIEW AND ADVISE     PER BINDER  Limb weakness and numbness Weakness and numbness in limbs Urgent - warm transfer · Always notify practice of call by sending a telephone encounter    CALLED CLINIC TO EXPRESS THE RED FLAG CONCERN AND WAS ADVISED TO PLACE ENCOUNTER AS THE STAFF WAS WITH PATIENTS AT THIS TIME.

## 2024-06-11 NOTE — TELEPHONE ENCOUNTER
Notified patient that we called in medrol dose pack to help with symptom flare. Patient verbalized understanding.

## 2024-07-22 RX ORDER — OXCARBAZEPINE 150 MG/1
150 TABLET, FILM COATED ORAL 2 TIMES DAILY
Qty: 60 TABLET | Refills: 5 | Status: SHIPPED | OUTPATIENT
Start: 2024-07-22

## 2024-07-22 NOTE — TELEPHONE ENCOUNTER
Rx Refill Note  Requested Prescriptions     Pending Prescriptions Disp Refills    OXcarbazepine (TRILEPTAL) 150 MG tablet [Pharmacy Med Name: OXCARBAZEPINE 150MG TABLETS] 60 tablet 5     Sig: TAKE 1 TABLET BY MOUTH TWICE DAILY      Last filled:  01/19/24 w/5  Last office visit with prescribing clinician: 1/19/2024      Next office visit with prescribing clinician: 10/21/2024     Shirlene Coffman MA  07/22/24, 17:45 EDT

## 2024-08-02 ENCOUNTER — TELEPHONE (OUTPATIENT)
Dept: NEUROLOGY | Facility: CLINIC | Age: 44
End: 2024-08-02
Payer: COMMERCIAL

## 2024-08-02 RX ORDER — METHYLPREDNISOLONE 4 MG/1
TABLET ORAL
Qty: 21 TABLET | Refills: 0 | Status: SHIPPED | OUTPATIENT
Start: 2024-08-02

## 2024-08-02 NOTE — TELEPHONE ENCOUNTER
"HUB to RELAY \"Dr. Márquez called in a medrol dose pack to help with symptoms.\"    LVM letting patient know medrol dose pack was called in.   "

## 2024-10-16 NOTE — TELEPHONE ENCOUNTER
Office notes from derm at in [facility] date 3/28/22.  LM for patient to call back.  Will his PCP give derm referral?  Per previous message Dr Smith will only give medications until seen.  Will await return call from patient.     Jessy Jimenez RN     Provider: DR LARRY    Caller: MAXX    Relationship to Patient: PATIENT    Pharmacy: [facility] [id number]    Phone Number: [phone]    Reason for Call: PATIENT IS HAVING ONGOING NUMBNESS AND EXTREME FATIGUE AND ISN'T SURE WHAT TO DO TO ABOUT IT.     When was the patient last seen: 4/19/24 TELEMEDICINE    When did it start: 5 DAYS AGO    Where is it located: ALL OVER    Characteristics of symptom/severity: 6 OUT OF 10    Timing- Is it constant or intermittent: CONSTANT    What makes it worse: NOT ENOUGH SLEEP    What makes it better: NOTHING    What therapies/medications have you tried: NORMAL PRESCRIPTIONS

## 2024-10-21 ENCOUNTER — TELEMEDICINE (OUTPATIENT)
Dept: NEUROLOGY | Facility: CLINIC | Age: 44
End: 2024-10-21
Payer: COMMERCIAL

## 2024-10-21 ENCOUNTER — TELEPHONE (OUTPATIENT)
Dept: NEUROLOGY | Facility: CLINIC | Age: 44
End: 2024-10-21

## 2024-10-21 DIAGNOSIS — G35 MULTIPLE SCLEROSIS: Primary | Chronic | ICD-10-CM

## 2024-10-21 DIAGNOSIS — M79.2 NEUROPATHIC PAIN: Chronic | ICD-10-CM

## 2024-10-21 PROCEDURE — 99214 OFFICE O/P EST MOD 30 MIN: CPT | Performed by: PSYCHIATRY & NEUROLOGY

## 2024-10-21 NOTE — PROGRESS NOTES
"Chief Complaint    RRMS    Subjective        Starr Armenta presents to [facility] NEUROLOGY  History of Present Illness    43 y.o.  female returns in follow up.  Last visit on 4/19/24 continued Kesimpta, OXC.      Tolerating Kesimpta.     Sx of L hand numbness 1/5 digits difficulty typing.       Right mild numbness.     Dx 20 years old with left thumb numbness.       Hugs have resolved on OXC.       90% improvement in sx.       DMT:  GA for 10 years then stopped        Reviewed medical records:     Admitted [facility] [place]  1/9/24 - 1/10/24 L sided weakness.  Tx IVMP.     Admitted [facility] 1/13/24 - 1/18/24 for MS flare.  S/P Rituxan, IVMP x 5 days.       MRI B/C.T, few scattered T2 PVWM lesions, heterogenous cervical cord signal, enhancing lesion C3-4      Hep panel neg     CSF - WBC 3, RBC 20, VDRL negaitve      RPR titer 1:4, Treponema ab negative     Dx with MS 20 years,         Objective   Vital Signs:  There were no vitals taken for this visit.  Estimated body mass index is 34.15 kg/m² as calculated from the following:    Height as of 1/19/24: 180.3 cm (70.98\").    Weight as of 1/19/24: 111 kg (244 lb 11.4 oz).        Neurological Exam  Mental Status  Awake. Oriented to person, place and time. Recent and remote memory are intact. Speech is normal.       Physical Exam  Constitutional:       General: She is awake.   Psychiatric:         Speech: Speech normal.        Result Review :  The following data was reviewed by: Panda Márquez MD on 10/21/2024:  Common labs          1/13/2024    02:54 1/13/2024    10:38 1/16/2024    10:39 1/17/2024    16:23   Common Labs   Glucose 112    132    BUN 18    23    Creatinine 0.70  1.00   0.73    Sodium 141    140    Potassium 3.7    3.7    Chloride 104    105    Calcium 8.4    8.2    Albumin 4.2   3.7  3.7    Total Bilirubin 0.7    0.5    Alkaline Phosphatase 68    78    AST (SGOT) 48    24    ALT (SGPT) 59    45    WBC 14.47    19.01    Hemoglobin 13.9  11.2   13.9  "   Hematocrit 41.6  33   39.9    Platelets 321    341                Assessment and Plan   Diagnoses and all orders for this visit:    1. Multiple sclerosis (Primary)  Assessment & Plan:  Stable on Kesimpta     MRI B/C    Orders:  -     MRI Brain Without Contrast; Future  -     MRI Cervical Spine With & Without Contrast; Future    2. Neuropathic pain  Assessment & Plan:  Sx controlled on OXC               Follow Up   No follow-ups on file.  Patient was given instructions and counseling regarding her condition or for health maintenance advice. Please see specific information pulled into the AVS if appropriate.     Mode of Visit: Video  Location of patient: Home  Location of provider: [facility]  You have chosen to receive care through a telehealth visit.  The patient has signed the video visit consent form.  The visit included audio and video interaction. No technical issues occurred during this visit.

## 2024-10-30 ENCOUNTER — TRANSCRIBE ORDERS (OUTPATIENT)
Dept: ADMINISTRATIVE | Facility: HOSPITAL | Age: 44
End: 2024-10-30
Payer: COMMERCIAL

## 2024-10-30 DIAGNOSIS — Z12.31 VISIT FOR SCREENING MAMMOGRAM: Primary | ICD-10-CM

## 2024-11-11 ENCOUNTER — HOSPITAL ENCOUNTER (OUTPATIENT)
Dept: MAMMOGRAPHY | Facility: HOSPITAL | Age: 44
Discharge: HOME | End: 2024-11-11
Admitting: STUDENT IN AN ORGANIZED HEALTH CARE EDUCATION/TRAINING PROGRAM
Payer: COMMERCIAL

## 2024-11-11 ENCOUNTER — TRANSCRIBE ORDERS (OUTPATIENT)
Dept: ADMINISTRATIVE | Facility: HOSPITAL | Age: 44
End: 2024-11-11
Payer: COMMERCIAL

## 2024-11-11 ENCOUNTER — PATIENT MESSAGE (OUTPATIENT)
Dept: NEUROLOGY | Facility: CLINIC | Age: 44
End: 2024-11-11
Payer: COMMERCIAL

## 2024-11-11 DIAGNOSIS — K75.81 NASH (NONALCOHOLIC STEATOHEPATITIS): Primary | ICD-10-CM

## 2024-11-11 DIAGNOSIS — Z12.31 VISIT FOR SCREENING MAMMOGRAM: ICD-10-CM

## 2024-11-11 PROCEDURE — 77063 BREAST TOMOSYNTHESIS BI: CPT | Performed by: RADIOLOGY

## 2024-11-11 PROCEDURE — 77063 BREAST TOMOSYNTHESIS BI: CPT

## 2024-11-11 PROCEDURE — 77067 SCR MAMMO BI INCL CAD: CPT

## 2024-11-11 PROCEDURE — 77067 SCR MAMMO BI INCL CAD: CPT | Performed by: RADIOLOGY

## 2024-11-14 ENCOUNTER — HOSPITAL ENCOUNTER (OUTPATIENT)
Dept: MRI IMAGING | Facility: HOSPITAL | Age: 44
Discharge: HOME | End: 2024-11-14
Payer: COMMERCIAL

## 2024-11-14 ENCOUNTER — TELEPHONE (OUTPATIENT)
Dept: NEUROLOGY | Facility: CLINIC | Age: 44
End: 2024-11-14
Payer: COMMERCIAL

## 2024-11-14 DIAGNOSIS — G35 MULTIPLE SCLEROSIS: Chronic | ICD-10-CM

## 2024-11-14 PROCEDURE — 70551 MRI BRAIN STEM W/O DYE: CPT | Performed by: RADIOLOGY

## 2024-11-14 PROCEDURE — 72156 MRI NECK SPINE W/O & W/DYE: CPT | Performed by: RADIOLOGY

## 2024-11-14 PROCEDURE — 72156 MRI NECK SPINE W/O & W/DYE: CPT

## 2024-11-14 PROCEDURE — 70551 MRI BRAIN STEM W/O DYE: CPT

## 2024-11-14 PROCEDURE — 25510000002 GADOBENATE DIMEGLUMINE 529 MG/ML SOLUTION: Performed by: PSYCHIATRY & NEUROLOGY

## 2024-11-14 PROCEDURE — A9577 INJ MULTIHANCE: HCPCS | Performed by: PSYCHIATRY & NEUROLOGY

## 2024-11-14 RX ADMIN — GADOBENATE DIMEGLUMINE 20 ML: 529 INJECTION, SOLUTION INTRAVENOUS at 09:15

## 2024-11-14 NOTE — TELEPHONE ENCOUNTER
"Called patient and left vm with results.    ----- Message from Panda Márquez sent at 11/14/2024  9:33 AM EST -----  Relay     \"Notify pt MRI is stable \"                "

## 2024-12-09 ENCOUNTER — HOSPITAL ENCOUNTER (OUTPATIENT)
Dept: ULTRASOUND IMAGING | Facility: HOSPITAL | Age: 44
Discharge: HOME | End: 2024-12-09
Admitting: STUDENT IN AN ORGANIZED HEALTH CARE EDUCATION/TRAINING PROGRAM
Payer: COMMERCIAL

## 2024-12-09 DIAGNOSIS — K75.81 NASH (NONALCOHOLIC STEATOHEPATITIS): ICD-10-CM

## 2024-12-09 PROCEDURE — 76700 US EXAM ABDOM COMPLETE: CPT

## 2024-12-09 PROCEDURE — 76700 US EXAM ABDOM COMPLETE: CPT | Performed by: RADIOLOGY

## 2025-01-17 ENCOUNTER — SPECIALTY PHARMACY (OUTPATIENT)
Dept: ONCOLOGY | Facility: HOSPITAL | Age: 45
End: 2025-01-17
Payer: COMMERCIAL

## 2025-03-18 ENCOUNTER — TELEPHONE (OUTPATIENT)
Dept: NEUROLOGY | Facility: CLINIC | Age: 45
End: 2025-03-18
Payer: COMMERCIAL

## 2025-03-18 ENCOUNTER — SPECIALTY PHARMACY (OUTPATIENT)
Dept: ONCOLOGY | Facility: HOSPITAL | Age: 45
End: 2025-03-18
Payer: COMMERCIAL

## 2025-03-18 RX ORDER — OFATUMUMAB 20 MG/.4ML
INJECTION, SOLUTION SUBCUTANEOUS
Qty: 1.2 ML | Refills: 3 | Status: SHIPPED | OUTPATIENT
Start: 2025-03-18

## 2025-03-18 NOTE — TELEPHONE ENCOUNTER
Spoke to Starr to inform that the June visit will need to be in person since we are required to see everyone in the office 1x per year.  She expressed understanding and appreciation.

## 2025-04-07 RX ORDER — OXCARBAZEPINE 150 MG/1
150 TABLET, FILM COATED ORAL 2 TIMES DAILY
Qty: 60 TABLET | Refills: 1 | Status: SHIPPED | OUTPATIENT
Start: 2025-04-07

## 2025-04-07 NOTE — TELEPHONE ENCOUNTER
Rx Refill Note  Requested Prescriptions     Pending Prescriptions Disp Refills    OXcarbazepine (TRILEPTAL) 150 MG tablet 60 tablet 5     Sig: Take 1 tablet by mouth 2 (Two) Times a Day.      Last filled: 7/22/24 60 with 5 refills.  Last office visit with prescribing clinician: 1/19/2024      Next office visit with prescribing clinician: 6/18/2025     Sent in 60 with 1 refill.     Sharona Scott MA  04/07/25, 08:16 EDT

## 2025-06-12 RX ORDER — OXCARBAZEPINE 150 MG/1
150 TABLET, FILM COATED ORAL 2 TIMES DAILY
Qty: 60 TABLET | Refills: 1 | Status: SHIPPED | OUTPATIENT
Start: 2025-06-12

## 2025-06-12 NOTE — TELEPHONE ENCOUNTER
Rx Refill Note  Requested Prescriptions     Pending Prescriptions Disp Refills    OXcarbazepine (TRILEPTAL) 150 MG tablet 60 tablet 1     Sig: Take 1 tablet by mouth 2 (Two) Times a Day.      Last filled:  04/07/25 w/1  Last office visit with prescribing clinician: 1/19/2024      Next office visit with prescribing clinician: 6/18/2025     Shirlene Coffman MA  06/12/25, 16:23 EDT

## 2025-06-18 ENCOUNTER — LAB (OUTPATIENT)
Dept: LAB | Facility: HOSPITAL | Age: 45
End: 2025-06-18
Payer: COMMERCIAL

## 2025-06-18 ENCOUNTER — OFFICE VISIT (OUTPATIENT)
Dept: NEUROLOGY | Facility: CLINIC | Age: 45
End: 2025-06-18
Payer: COMMERCIAL

## 2025-06-18 VITALS
DIASTOLIC BLOOD PRESSURE: 78 MMHG | BODY MASS INDEX: 36.4 KG/M2 | OXYGEN SATURATION: 100 % | WEIGHT: 260 LBS | SYSTOLIC BLOOD PRESSURE: 118 MMHG | HEIGHT: 71 IN | HEART RATE: 80 BPM

## 2025-06-18 DIAGNOSIS — G35 MULTIPLE SCLEROSIS: Primary | Chronic | ICD-10-CM

## 2025-06-18 DIAGNOSIS — G35 MULTIPLE SCLEROSIS: ICD-10-CM

## 2025-06-18 LAB
ALBUMIN SERPL-MCNC: 4.4 G/DL (ref 3.5–5.2)
ALBUMIN/GLOB SERPL: 1.6 G/DL
ALP SERPL-CCNC: 91 U/L (ref 39–117)
ALT SERPL W P-5'-P-CCNC: 23 U/L (ref 1–33)
ANION GAP SERPL CALCULATED.3IONS-SCNC: 13 MMOL/L (ref 5–15)
AST SERPL-CCNC: 40 U/L (ref 1–32)
BASOPHILS # BLD AUTO: 0.11 10*3/MM3 (ref 0–0.2)
BASOPHILS NFR BLD AUTO: 1.2 % (ref 0–1.5)
BILIRUB SERPL-MCNC: 0.9 MG/DL (ref 0–1.2)
BUN SERPL-MCNC: 11 MG/DL (ref 6–20)
BUN/CREAT SERPL: 11.7 (ref 7–25)
CALCIUM SPEC-SCNC: 9.4 MG/DL (ref 8.6–10.5)
CHLORIDE SERPL-SCNC: 101 MMOL/L (ref 98–107)
CO2 SERPL-SCNC: 24 MMOL/L (ref 22–29)
CREAT SERPL-MCNC: 0.94 MG/DL (ref 0.57–1)
DEPRECATED RDW RBC AUTO: 42.8 FL (ref 37–54)
EGFRCR SERPLBLD CKD-EPI 2021: 75.9 ML/MIN/1.73
EOSINOPHIL # BLD AUTO: 0.19 10*3/MM3 (ref 0–0.4)
EOSINOPHIL NFR BLD AUTO: 2.1 % (ref 0.3–6.2)
ERYTHROCYTE [DISTWIDTH] IN BLOOD BY AUTOMATED COUNT: 13.1 % (ref 12.3–15.4)
GLOBULIN UR ELPH-MCNC: 2.8 GM/DL
GLUCOSE SERPL-MCNC: 106 MG/DL (ref 65–99)
HCT VFR BLD AUTO: 39.3 % (ref 34–46.6)
HGB BLD-MCNC: 13.3 G/DL (ref 12–15.9)
IMM GRANULOCYTES # BLD AUTO: 0.02 10*3/MM3 (ref 0–0.05)
IMM GRANULOCYTES NFR BLD AUTO: 0.2 % (ref 0–0.5)
LYMPHOCYTES # BLD AUTO: 2.71 10*3/MM3 (ref 0.7–3.1)
LYMPHOCYTES NFR BLD AUTO: 30.5 % (ref 19.6–45.3)
MCH RBC QN AUTO: 30.2 PG (ref 26.6–33)
MCHC RBC AUTO-ENTMCNC: 33.8 G/DL (ref 31.5–35.7)
MCV RBC AUTO: 89.1 FL (ref 79–97)
MONOCYTES # BLD AUTO: 1 10*3/MM3 (ref 0.1–0.9)
MONOCYTES NFR BLD AUTO: 11.2 % (ref 5–12)
NEUTROPHILS NFR BLD AUTO: 4.86 10*3/MM3 (ref 1.7–7)
NEUTROPHILS NFR BLD AUTO: 54.8 % (ref 42.7–76)
NRBC BLD AUTO-RTO: 0 /100 WBC (ref 0–0.2)
PLATELET # BLD AUTO: 354 10*3/MM3 (ref 140–450)
PMV BLD AUTO: 9.8 FL (ref 6–12)
POTASSIUM SERPL-SCNC: 4.3 MMOL/L (ref 3.5–5.2)
PROT SERPL-MCNC: 7.2 G/DL (ref 6–8.5)
RBC # BLD AUTO: 4.41 10*6/MM3 (ref 3.77–5.28)
SODIUM SERPL-SCNC: 138 MMOL/L (ref 136–145)
WBC NRBC COR # BLD AUTO: 8.89 10*3/MM3 (ref 3.4–10.8)

## 2025-06-18 PROCEDURE — 82785 ASSAY OF IGE: CPT

## 2025-06-18 PROCEDURE — 80053 COMPREHEN METABOLIC PANEL: CPT

## 2025-06-18 PROCEDURE — 36415 COLL VENOUS BLD VENIPUNCTURE: CPT

## 2025-06-18 PROCEDURE — 82784 ASSAY IGA/IGD/IGG/IGM EACH: CPT

## 2025-06-18 PROCEDURE — 99214 OFFICE O/P EST MOD 30 MIN: CPT | Performed by: PSYCHIATRY & NEUROLOGY

## 2025-06-18 PROCEDURE — 85025 COMPLETE CBC W/AUTO DIFF WBC: CPT

## 2025-06-18 RX ORDER — CHOLECALCIFEROL (VITAMIN D3) 50 MCG
1 TABLET ORAL DAILY
COMMUNITY
Start: 2025-04-29

## 2025-06-18 RX ORDER — OXCARBAZEPINE 150 MG/1
150 TABLET, FILM COATED ORAL 2 TIMES DAILY
Qty: 180 TABLET | Refills: 3 | Status: SHIPPED | OUTPATIENT
Start: 2025-06-18

## 2025-06-18 NOTE — PROGRESS NOTES
"Chief Complaint  Multiple Sclerosis    Subjective        Starr Armenta presents to [facility] NEUROLOGY  History of Present Illness    44 y.o. female returns in follow up.  Last visit on 10/21/24 continued Kesimpta, OXC, ordered MRI B/C. .      MRI B/C, my review of films, 11/14/24 as compared to 1/14/24 few scattered PVWM lesions, heterogenous cord signal, no new/enlarging/enhancing lesions.     Tolerating Kesimpta.     Continued L hand numbness 1/5 digits difficulty typing.        Right mild numbness.      Dx 20 years old with left thumb numbness.       Hugs have resolved on OXC.         DMT:  GA for 10 years then stopped        Reviewed medical records:     Admitted [facility]  1/9/24 - 1/10/24 L sided weakness.  Tx IVMP.     Admitted [facility] 1/13/24 - 1/18/24 for MS flare.  S/P Rituxan, IVMP x 5 days.       MRI B/C.T, few scattered T2 PVWM lesions, heterogenous cervical cord signal, enhancing lesion C3-4      Hep panel neg      CSF - WBC 3, RBC 20, VDRL negaitve      RPR titer 1:4, Treponema ab negative     Dx with MS 20 years,         Objective   Vital Signs:  /78   Pulse 80   Ht 180.3 cm (70.98\")   Wt 118 kg (260 lb)   SpO2 100%   BMI 36.28 kg/m²   Estimated body mass index is 36.28 kg/m² as calculated from the following:    Height as of this encounter: 180.3 cm (70.98\").    Weight as of this encounter: 118 kg (260 lb).        Neurological Exam  Mental Status  Awake. Oriented to person, place and time. Recent and remote memory are intact. Speech is normal.    Sensory  Light touch abnormality: Pinprick abnormality:   Decreased sensation on left hand .       Physical Exam  Constitutional:       General: She is awake.   Psychiatric:         Speech: Speech normal.        Result Review :  The following data was reviewed by: Panda Márquez MD on 06/18/2025:              Assessment and Plan   Diagnoses and all orders for this visit:    1. Multiple sclerosis (Primary)  Assessment & Plan:  Continue " Kesimpta 20 mg sq q 4 weeks    CBC,CMP, quant Ig     Oxcarbazepine 150 mg BID     Orders:  -     CBC & Differential; Future  -     Comprehensive Metabolic Panel; Future  -     Immunoglobulins Quant; Future    Other orders  -     OXcarbazepine (TRILEPTAL) 150 MG tablet; Take 1 tablet by mouth 2 (Two) Times a Day.  Dispense: 180 tablet; Refill: 3             Follow Up   No follow-ups on file.  Patient was given instructions and counseling regarding her condition or for health maintenance advice. Please see specific information pulled into the AVS if appropriate.

## 2025-06-24 LAB
IGA SERPL-MCNC: 318 MG/DL (ref 87–352)
IGE SERPL-ACNC: 5 IU/ML (ref 6–495)
IGG SERPL-MCNC: 822 MG/DL (ref 586–1602)
IGM SERPL-MCNC: 32 MG/DL (ref 26–217)